# Patient Record
Sex: MALE
[De-identification: names, ages, dates, MRNs, and addresses within clinical notes are randomized per-mention and may not be internally consistent; named-entity substitution may affect disease eponyms.]

---

## 2017-04-05 ENCOUNTER — APPOINTMENT (OUTPATIENT)
Dept: MRI IMAGING | Facility: CLINIC | Age: 62
End: 2017-04-05

## 2017-04-05 ENCOUNTER — OUTPATIENT (OUTPATIENT)
Dept: OUTPATIENT SERVICES | Facility: HOSPITAL | Age: 62
LOS: 1 days | End: 2017-04-05
Payer: COMMERCIAL

## 2017-04-05 DIAGNOSIS — Z00.8 ENCOUNTER FOR OTHER GENERAL EXAMINATION: ICD-10-CM

## 2017-04-05 PROCEDURE — 73221 MRI JOINT UPR EXTREM W/O DYE: CPT

## 2017-06-30 ENCOUNTER — APPOINTMENT (OUTPATIENT)
Dept: UROLOGY | Facility: CLINIC | Age: 62
End: 2017-06-30

## 2018-02-05 ENCOUNTER — INPATIENT (INPATIENT)
Facility: HOSPITAL | Age: 63
LOS: 3 days | Discharge: HOME | DRG: 375 | End: 2018-02-09
Attending: INTERNAL MEDICINE | Admitting: HOSPITALIST
Payer: COMMERCIAL

## 2018-02-05 VITALS
SYSTOLIC BLOOD PRESSURE: 148 MMHG | OXYGEN SATURATION: 96 % | TEMPERATURE: 98 F | RESPIRATION RATE: 18 BRPM | DIASTOLIC BLOOD PRESSURE: 94 MMHG | HEART RATE: 113 BPM

## 2018-02-05 PROCEDURE — 99285 EMERGENCY DEPT VISIT HI MDM: CPT

## 2018-02-05 NOTE — ED ADULT NURSE NOTE - OBJECTIVE STATEMENT
62y male arrived to ED complaining of ab pain. Patient reports that pain is primarily on the right side, radiating to the back. Patient 62y male arrived to ED complaining of ab pain. Patient reports that pain is primarily on the right side, radiating to the back x3 weeks +10lb weight loss in this time. Patient reports that pain has been worsening. Went to PMD had sono and told to come to ED for further evaluation of liver lesion. Patient denies CP, SOB, n/v/d, chills, fever. PMHx TIA, DM.

## 2018-02-06 DIAGNOSIS — G45.9 TRANSIENT CEREBRAL ISCHEMIC ATTACK, UNSPECIFIED: ICD-10-CM

## 2018-02-06 DIAGNOSIS — C79.9 SECONDARY MALIGNANT NEOPLASM OF UNSPECIFIED SITE: ICD-10-CM

## 2018-02-06 DIAGNOSIS — E11.9 TYPE 2 DIABETES MELLITUS WITHOUT COMPLICATIONS: ICD-10-CM

## 2018-02-06 DIAGNOSIS — C80.1 MALIGNANT (PRIMARY) NEOPLASM, UNSPECIFIED: ICD-10-CM

## 2018-02-06 DIAGNOSIS — Z98.890 OTHER SPECIFIED POSTPROCEDURAL STATES: Chronic | ICD-10-CM

## 2018-02-06 DIAGNOSIS — R52 PAIN, UNSPECIFIED: ICD-10-CM

## 2018-02-06 DIAGNOSIS — Z29.9 ENCOUNTER FOR PROPHYLACTIC MEASURES, UNSPECIFIED: ICD-10-CM

## 2018-02-06 DIAGNOSIS — Z95.818 PRESENCE OF OTHER CARDIAC IMPLANTS AND GRAFTS: Chronic | ICD-10-CM

## 2018-02-06 LAB
ALBUMIN SERPL ELPH-MCNC: 3.7 G/DL — SIGNIFICANT CHANGE UP (ref 3.3–5)
ALP SERPL-CCNC: 212 U/L — HIGH (ref 40–120)
ALT FLD-CCNC: 125 U/L RC — HIGH (ref 10–45)
ANION GAP SERPL CALC-SCNC: 15 MMOL/L — SIGNIFICANT CHANGE UP (ref 5–17)
APPEARANCE UR: CLEAR — SIGNIFICANT CHANGE UP
APTT BLD: 27.2 SEC — LOW (ref 27.5–37.4)
AST SERPL-CCNC: 119 U/L — HIGH (ref 10–40)
BASOPHILS # BLD AUTO: 0.1 K/UL — SIGNIFICANT CHANGE UP (ref 0–0.2)
BASOPHILS NFR BLD AUTO: 1.1 % — SIGNIFICANT CHANGE UP (ref 0–2)
BILIRUB SERPL-MCNC: 0.4 MG/DL — SIGNIFICANT CHANGE UP (ref 0.2–1.2)
BILIRUB UR-MCNC: NEGATIVE — SIGNIFICANT CHANGE UP
BUN SERPL-MCNC: 18 MG/DL — SIGNIFICANT CHANGE UP (ref 7–23)
CALCIUM SERPL-MCNC: 9.7 MG/DL — SIGNIFICANT CHANGE UP (ref 8.4–10.5)
CANCER AG125 SERPL-ACNC: 81 U/ML — HIGH
CEA SERPL-MCNC: 137.4 NG/ML — HIGH (ref 0–3.8)
CHLORIDE SERPL-SCNC: 95 MMOL/L — LOW (ref 96–108)
CO2 SERPL-SCNC: 26 MMOL/L — SIGNIFICANT CHANGE UP (ref 22–31)
COLOR SPEC: YELLOW — SIGNIFICANT CHANGE UP
COMMENT - URINE: SIGNIFICANT CHANGE UP
CREAT SERPL-MCNC: 1.01 MG/DL — SIGNIFICANT CHANGE UP (ref 0.5–1.3)
DIFF PNL FLD: NEGATIVE — SIGNIFICANT CHANGE UP
EOSINOPHIL # BLD AUTO: 0.2 K/UL — SIGNIFICANT CHANGE UP (ref 0–0.5)
EOSINOPHIL NFR BLD AUTO: 3 % — SIGNIFICANT CHANGE UP (ref 0–6)
GAS PNL BLDV: SIGNIFICANT CHANGE UP
GLUCOSE SERPL-MCNC: 97 MG/DL — SIGNIFICANT CHANGE UP (ref 70–99)
GLUCOSE UR QL: >1000 MG/DL
HCT VFR BLD CALC: 41 % — SIGNIFICANT CHANGE UP (ref 39–50)
HCV AB S/CO SERPL IA: 0.16 S/CO — SIGNIFICANT CHANGE UP
HCV AB SERPL-IMP: SIGNIFICANT CHANGE UP
HGB BLD-MCNC: 14.2 G/DL — SIGNIFICANT CHANGE UP (ref 13–17)
HIV 1+2 AB+HIV1 P24 AG SERPL QL IA: SIGNIFICANT CHANGE UP
INR BLD: 1.13 RATIO — SIGNIFICANT CHANGE UP (ref 0.88–1.16)
KETONES UR-MCNC: ABNORMAL
LEUKOCYTE ESTERASE UR-ACNC: ABNORMAL
LIDOCAIN IGE QN: 68 U/L — HIGH (ref 7–60)
LYMPHOCYTES # BLD AUTO: 1.4 K/UL — SIGNIFICANT CHANGE UP (ref 1–3.3)
LYMPHOCYTES # BLD AUTO: 17 % — SIGNIFICANT CHANGE UP (ref 13–44)
MCHC RBC-ENTMCNC: 29.5 PG — SIGNIFICANT CHANGE UP (ref 27–34)
MCHC RBC-ENTMCNC: 34.7 GM/DL — SIGNIFICANT CHANGE UP (ref 32–36)
MCV RBC AUTO: 85 FL — SIGNIFICANT CHANGE UP (ref 80–100)
MONOCYTES # BLD AUTO: 1 K/UL — HIGH (ref 0–0.9)
MONOCYTES NFR BLD AUTO: 12.9 % — SIGNIFICANT CHANGE UP (ref 2–14)
NEUTROPHILS # BLD AUTO: 5.2 K/UL — SIGNIFICANT CHANGE UP (ref 1.8–7.4)
NEUTROPHILS NFR BLD AUTO: 66 % — SIGNIFICANT CHANGE UP (ref 43–77)
NITRITE UR-MCNC: NEGATIVE — SIGNIFICANT CHANGE UP
PH UR: 5.5 — SIGNIFICANT CHANGE UP (ref 5–8)
PLATELET # BLD AUTO: 322 K/UL — SIGNIFICANT CHANGE UP (ref 150–400)
POTASSIUM SERPL-MCNC: 4.1 MMOL/L — SIGNIFICANT CHANGE UP (ref 3.5–5.3)
POTASSIUM SERPL-SCNC: 4.1 MMOL/L — SIGNIFICANT CHANGE UP (ref 3.5–5.3)
PROT SERPL-MCNC: 7.3 G/DL — SIGNIFICANT CHANGE UP (ref 6–8.3)
PROT UR-MCNC: SIGNIFICANT CHANGE UP
PROTHROM AB SERPL-ACNC: 12.3 SEC — SIGNIFICANT CHANGE UP (ref 9.8–12.7)
RBC # BLD: 4.82 M/UL — SIGNIFICANT CHANGE UP (ref 4.2–5.8)
RBC # FLD: 12.9 % — SIGNIFICANT CHANGE UP (ref 10.3–14.5)
RBC CASTS # UR COMP ASSIST: SIGNIFICANT CHANGE UP /HPF (ref 0–2)
SODIUM SERPL-SCNC: 136 MMOL/L — SIGNIFICANT CHANGE UP (ref 135–145)
SP GR SPEC: >1.03 — HIGH (ref 1.01–1.02)
UROBILINOGEN FLD QL: NEGATIVE — SIGNIFICANT CHANGE UP
WBC # BLD: 8 K/UL — SIGNIFICANT CHANGE UP (ref 3.8–10.5)
WBC # FLD AUTO: 8 K/UL — SIGNIFICANT CHANGE UP (ref 3.8–10.5)
WBC UR QL: SIGNIFICANT CHANGE UP /HPF (ref 0–5)

## 2018-02-06 PROCEDURE — 71260 CT THORAX DX C+: CPT | Mod: 26

## 2018-02-06 PROCEDURE — 99233 SBSQ HOSP IP/OBS HIGH 50: CPT | Mod: GC

## 2018-02-06 PROCEDURE — 99223 1ST HOSP IP/OBS HIGH 75: CPT

## 2018-02-06 PROCEDURE — 99254 IP/OBS CNSLTJ NEW/EST MOD 60: CPT | Mod: GC

## 2018-02-06 PROCEDURE — 74177 CT ABD & PELVIS W/CONTRAST: CPT | Mod: 26

## 2018-02-06 PROCEDURE — 71046 X-RAY EXAM CHEST 2 VIEWS: CPT | Mod: 26

## 2018-02-06 RX ORDER — CLOPIDOGREL BISULFATE 75 MG/1
0 TABLET, FILM COATED ORAL
Qty: 0 | Refills: 0 | COMMUNITY

## 2018-02-06 RX ORDER — PANTOPRAZOLE SODIUM 20 MG/1
40 TABLET, DELAYED RELEASE ORAL
Qty: 0 | Refills: 0 | Status: DISCONTINUED | OUTPATIENT
Start: 2018-02-06 | End: 2018-02-09

## 2018-02-06 RX ORDER — ASPIRIN/CALCIUM CARB/MAGNESIUM 324 MG
0 TABLET ORAL
Qty: 0 | Refills: 0 | COMMUNITY

## 2018-02-06 RX ORDER — PANTOPRAZOLE SODIUM 20 MG/1
0 TABLET, DELAYED RELEASE ORAL
Qty: 0 | Refills: 0 | COMMUNITY

## 2018-02-06 RX ORDER — ASPIRIN/CALCIUM CARB/MAGNESIUM 324 MG
81 TABLET ORAL DAILY
Qty: 0 | Refills: 0 | Status: DISCONTINUED | OUTPATIENT
Start: 2018-02-06 | End: 2018-02-07

## 2018-02-06 RX ORDER — HEPARIN SODIUM 5000 [USP'U]/ML
5000 INJECTION INTRAVENOUS; SUBCUTANEOUS EVERY 8 HOURS
Qty: 0 | Refills: 0 | Status: DISCONTINUED | OUTPATIENT
Start: 2018-02-06 | End: 2018-02-09

## 2018-02-06 RX ORDER — INSULIN LISPRO 100/ML
VIAL (ML) SUBCUTANEOUS AT BEDTIME
Qty: 0 | Refills: 0 | Status: DISCONTINUED | OUTPATIENT
Start: 2018-02-06 | End: 2018-02-09

## 2018-02-06 RX ORDER — TAMSULOSIN HYDROCHLORIDE 0.4 MG/1
0.4 CAPSULE ORAL AT BEDTIME
Qty: 0 | Refills: 0 | Status: DISCONTINUED | OUTPATIENT
Start: 2018-02-06 | End: 2018-02-09

## 2018-02-06 RX ORDER — KETOROLAC TROMETHAMINE 30 MG/ML
15 SYRINGE (ML) INJECTION EVERY 12 HOURS
Qty: 0 | Refills: 0 | Status: DISCONTINUED | OUTPATIENT
Start: 2018-02-06 | End: 2018-02-09

## 2018-02-06 RX ORDER — ATORVASTATIN CALCIUM 80 MG/1
0 TABLET, FILM COATED ORAL
Qty: 0 | Refills: 0 | COMMUNITY

## 2018-02-06 RX ORDER — DEXTROSE 50 % IN WATER 50 %
25 SYRINGE (ML) INTRAVENOUS ONCE
Qty: 0 | Refills: 0 | Status: DISCONTINUED | OUTPATIENT
Start: 2018-02-06 | End: 2018-02-09

## 2018-02-06 RX ORDER — DEXTROSE 50 % IN WATER 50 %
12.5 SYRINGE (ML) INTRAVENOUS ONCE
Qty: 0 | Refills: 0 | Status: DISCONTINUED | OUTPATIENT
Start: 2018-02-06 | End: 2018-02-09

## 2018-02-06 RX ORDER — SODIUM CHLORIDE 9 MG/ML
1000 INJECTION INTRAMUSCULAR; INTRAVENOUS; SUBCUTANEOUS ONCE
Qty: 0 | Refills: 0 | Status: COMPLETED | OUTPATIENT
Start: 2018-02-06 | End: 2018-02-06

## 2018-02-06 RX ORDER — GLUCAGON INJECTION, SOLUTION 0.5 MG/.1ML
1 INJECTION, SOLUTION SUBCUTANEOUS ONCE
Qty: 0 | Refills: 0 | Status: DISCONTINUED | OUTPATIENT
Start: 2018-02-06 | End: 2018-02-09

## 2018-02-06 RX ORDER — OXYCODONE HYDROCHLORIDE 5 MG/1
5 TABLET ORAL EVERY 6 HOURS
Qty: 0 | Refills: 0 | Status: DISCONTINUED | OUTPATIENT
Start: 2018-02-06 | End: 2018-02-09

## 2018-02-06 RX ORDER — DEXTROSE 50 % IN WATER 50 %
1 SYRINGE (ML) INTRAVENOUS ONCE
Qty: 0 | Refills: 0 | Status: DISCONTINUED | OUTPATIENT
Start: 2018-02-06 | End: 2018-02-09

## 2018-02-06 RX ORDER — TAMSULOSIN HYDROCHLORIDE 0.4 MG/1
0 CAPSULE ORAL
Qty: 0 | Refills: 0 | COMMUNITY

## 2018-02-06 RX ORDER — GLYBURIDE 5 MG
0 TABLET ORAL
Qty: 0 | Refills: 0 | COMMUNITY

## 2018-02-06 RX ORDER — METFORMIN HYDROCHLORIDE 850 MG/1
0 TABLET ORAL
Qty: 0 | Refills: 0 | COMMUNITY

## 2018-02-06 RX ORDER — SODIUM CHLORIDE 9 MG/ML
1000 INJECTION, SOLUTION INTRAVENOUS
Qty: 0 | Refills: 0 | Status: DISCONTINUED | OUTPATIENT
Start: 2018-02-06 | End: 2018-02-09

## 2018-02-06 RX ORDER — KETOROLAC TROMETHAMINE 30 MG/ML
15 SYRINGE (ML) INJECTION EVERY 8 HOURS
Qty: 0 | Refills: 0 | Status: DISCONTINUED | OUTPATIENT
Start: 2018-02-06 | End: 2018-02-06

## 2018-02-06 RX ORDER — CANAGLIFLOZIN 100 MG/1
0 TABLET, FILM COATED ORAL
Qty: 0 | Refills: 0 | COMMUNITY

## 2018-02-06 RX ORDER — INSULIN LISPRO 100/ML
VIAL (ML) SUBCUTANEOUS
Qty: 0 | Refills: 0 | Status: DISCONTINUED | OUTPATIENT
Start: 2018-02-06 | End: 2018-02-09

## 2018-02-06 RX ADMIN — Medication 81 MILLIGRAM(S): at 15:15

## 2018-02-06 RX ADMIN — SODIUM CHLORIDE 1000 MILLILITER(S): 9 INJECTION INTRAMUSCULAR; INTRAVENOUS; SUBCUTANEOUS at 03:55

## 2018-02-06 RX ADMIN — PANTOPRAZOLE SODIUM 40 MILLIGRAM(S): 20 TABLET, DELAYED RELEASE ORAL at 15:14

## 2018-02-06 RX ADMIN — OXYCODONE HYDROCHLORIDE 5 MILLIGRAM(S): 5 TABLET ORAL at 16:57

## 2018-02-06 RX ADMIN — OXYCODONE HYDROCHLORIDE 5 MILLIGRAM(S): 5 TABLET ORAL at 16:27

## 2018-02-06 RX ADMIN — TAMSULOSIN HYDROCHLORIDE 0.4 MILLIGRAM(S): 0.4 CAPSULE ORAL at 23:23

## 2018-02-06 RX ADMIN — HEPARIN SODIUM 5000 UNIT(S): 5000 INJECTION INTRAVENOUS; SUBCUTANEOUS at 15:16

## 2018-02-06 NOTE — CONSULT NOTE ADULT - ATTENDING COMMENTS
Imaging findings suggestive of metastatic disease to liver, lungs, mediastinum.  Dysphagia, CT suggesting extrinsic LN compression on esophagus, can not rule out primary esophageal pathology  Will discuss with oncology order of diagnostic testing  Patient and family aware  Will follow up
Pt seen and examined,d/w family and team.Presentation c/w Esophageal Cancer, will await Bx after EUS.Agree w POC and A/P.

## 2018-02-06 NOTE — H&P ADULT - NSHPREVIEWOFSYSTEMS_GEN_ALL_CORE
Review of Systems:   CONSTITUTIONAL: No fever  EYES: had episode of tunnel vision that resolved (?at time of diagnosed TIA)  ENMT:  No difficulty hearing,   NECK: No pain or stiffness  RESPIRATORY: No cough, No shortness of breath  CARDIOVASCULAR: No chest pain, palpitations,  GASTROINTESTINAL: RUQ abd pain and abdominal distention. No nausea, vomiting,  GENITOURINARY: No dysuria, no difficulty urinating  NEUROLOGICAL: No headaches,   SKIN: No rashes  MUSCULOSKELETAL: No joint pain or swelling;   PSYCHIATRIC: No depression,   ALLERY AND IMMUNOLOGIC: No hives or eczema

## 2018-02-06 NOTE — PROGRESS NOTE ADULT - SUBJECTIVE AND OBJECTIVE BOX
[facility] - Division of Pulmonary, Critical Care and Sleep Medicine   Please call [phone] between 8am-pm weekdays, [phone] after hours and weekends      CHIEF COMPLAINT:  Abdominal pain, constipation    HPI:  63M h/o recent TIA, DM2, BPH p/w RUQ abdominal pain with radiation to back, decreased po intake with 10lb weight loss and constipation for the past 2-3 weeks. Patient states he developed right arm numbness on Jan 19th, that resolved in a few days, was seen by a neurologist and had outpatient MRI brain reportedly without hemorrhage or acute stroke and loop recorder placed (left chest) on 2/2 (MRI images and report not available for review). Was diagnosed with TIA and started on asa and plavix.   Also recently started on Bydureon, recent outpatient labs with elevated LFTs, with an outpatient abd us that suggested liver mets. Denies fever, chills, nausea, vomiting, chest pain or cough.   CXR with b/l pulmonary nodules. CT C/A/P with diffuse lung mets, mediastinal lymphadenopathy, esophageal thickening, multiple hepatic mets and left adrenal nodule    +Former smoker, 25 pack years. Denies prior pulmonary history. Denies SOB or cough.     Colonoscopy at age 51 - normal per patient and was scheduled for colonoscopy on 3/2/18 with Dr. Tavares.   patient had psa checked 12/2017 and was normal per patient. no family history of cancer.       PAST MEDICAL & SURGICAL HISTORY:  TIA (transient ischemic attack)  Diabetes  H/O prior ablation treatment: for wpw  Status post placement of implantable loop recorder      FAMILY HISTORY:  Family history of hypertension (Father)      SOCIAL HISTORY:  Smoking: [ ] Never Smoked [x ] Former Smoker (_1_ packs x _25__ years) [ ] Current Smoker  (__ packs x ___ years)  Substance Use: [- ] Never Used [ ] Used ____  EtOH Use: denies  Marital Status: [ ] Single [x ]  [ ]  [ ]   Occupation: , denies occupational exposures  Recent Travel:  Country of Birth:  Advance Directives:    Allergies    No Known Allergies    HOME MEDICATIONS: reviewed in H&P    REVIEW OF SYSTEMS:  Constitutional: [- ] fevers [- ] chills [ ] weight loss [ ] weight gain  HEENT: [ ] dry eyes [ ] eye irritation [ -] postnasal drip [ ] nasal congestion  CV: [ -] chest pain [ ] orthopnea [- ] palpitations [ ] murmur  Resp: [- ] cough [- ] shortness of breath [- ] wheezing [- ] sputum [ ] hemoptysis  GI: [- ] nausea [- ] vomiting [ ] diarrhea [+ ] constipation [+ ] abd pain [ ] dysphagia   : [ -] dysuria [- ] nocturia [ ] hematuria [ ] increased urinary frequency  Musculoskeletal: [ -] myalgias [ ] arthralgias   Skin: [- ] rash [ ] itch  Neurological: [- ] headache [ ] dizziness [ ] syncope [ ] weakness [+ ] numbness/tingling RUE  Psychiatric: [- ] anxiety [- ] depression  Endocrine: [+ ] diabetes [- ] thyroid problem  Hematologic/Lymphatic: [- ] anemia [ -] bleeding problem  Allergic/Immunologic: [- ] itchy eyes [- ] nasal discharge [ ] hives [ ] angioedema  [x ] All other systems negative  [ ] Unable to assess ROS because ________    OBJECTIVE:  ICU Vital Signs Last 24 Hrs  T(C): 36.6 (06 Feb 2018 13:40), Max: 36.9 (06 Feb 2018 02:39)  T(F): 97.9 (06 Feb 2018 13:40), Max: 98.5 (06 Feb 2018 02:39)  HR: 96 (06 Feb 2018 13:40) (96 - 113)  BP: 124/78 (06 Feb 2018 13:40) (112/77 - 148/94)  BP(mean): --  ABP: --  ABP(mean): --  RR: 16 (06 Feb 2018 13:40) (16 - 18)  SpO2: 96% (06 Feb 2018 13:40) (94% - 96%)  POCT Blood Glucose.: 133 mg/dL (06 Feb 2018 11:07)      PHYSICAL EXAM:  General:  NAD  HEENT:  NC/AT  Neck: Supple  Respiratory:  CTA B/L, no wheezes, crackles or rhonchi  Cardiovascular:  RRR, no m/r/g  Abdomen: soft, NT/ND, +BS  Extremities: mild clubbing, no cyanosis or edema, warm  Skin: no rash  Neurological: AAOx3, no focal deficits  Psychiatry: not anxious, normal affect    HOSPITAL MEDICATIONS:  MEDICATIONS  (STANDING):  aspirin enteric coated 81 milliGRAM(s) Oral daily  dextrose 5%. 1000 milliLiter(s) (50 mL/Hr) IV Continuous <Continuous>  dextrose 50% Injectable 12.5 Gram(s) IV Push once  dextrose 50% Injectable 25 Gram(s) IV Push once  dextrose 50% Injectable 25 Gram(s) IV Push once  heparin  Injectable 5000 Unit(s) SubCutaneous every 8 hours  insulin lispro (HumaLOG) corrective regimen sliding scale   SubCutaneous three times a day before meals  insulin lispro (HumaLOG) corrective regimen sliding scale   SubCutaneous at bedtime  pantoprazole    Tablet 40 milliGRAM(s) Oral before breakfast  tamsulosin 0.4 milliGRAM(s) Oral at bedtime    MEDICATIONS  (PRN):  dextrose Gel 1 Dose(s) Oral once PRN Blood Glucose LESS THAN 70 milliGRAM(s)/deciliter  glucagon  Injectable 1 milliGRAM(s) IntraMuscular once PRN Glucose LESS THAN 70 milligrams/deciliter  ketorolac   Injectable 15 milliGRAM(s) IV Push every 12 hours PRN Moderate Pain (4 - 6)  oxyCODONE    IR 5 milliGRAM(s) Oral every 6 hours PRN Severe Pain (7 - 10)      LABS:                        14.2   8.0   )-----------( 322      ( 06 Feb 2018 03:23 )             41.0     Hgb Trend: 14.2<--  02-06    136  |  95<L>  |  18  ----------------------------<  97  4.1   |  26  |  1.01    Ca    9.7      06 Feb 2018 03:23    TPro  7.3  /  Alb  3.7  /  TBili  0.4  /  DBili  x   /  AST  119<H>  /  ALT  125<H>  /  AlkPhos  212<H>  02-06    Creatinine Trend: 1.01<--  PT/INR - ( 06 Feb 2018 12:54 )   PT: 12.3 sec;   INR: 1.13 ratio         PTT - ( 06 Feb 2018 12:54 )  PTT:27.2 sec  Urinalysis Basic - ( 06 Feb 2018 03:55 )    Color: Yellow / Appearance: Clear / SG: >1.030 / pH: x  Gluc: x / Ketone: Trace  / Bili: Negative / Urobili: Negative   Blood: x / Protein: Trace / Nitrite: Negative   Leuk Esterase: Trace / RBC: 3-5 /HPF / WBC 6-10 /HPF   Sq Epi: x / Non Sq Epi: x / Bacteria: x        Venous Blood Gas:  02-06 @ 03:23  7.40/45/46/27/75  VBG Lactate: 5.0      MICROBIOLOGY:     RADIOLOGY:  [x ] Reviewed and interpreted by me  < from: CT Chest w/ IV Cont (02.06.18 @ 04:38) >  EXAM:  CT ABDOMEN AND PELVIS OC IC                          EXAM:  CT CHEST IC                            PROCEDURE DATE:  02/06/2018            INTERPRETATION:  CLINICAL INFORMATION: Mid abdominal pain radiating to   the right side. Known hepatic metastases.    COMPARISON: Chest radiograph on 2/6/2018.    PROCEDURE:   CT of the Chest, Abdomen and Pelvis was performed with intravenous   contrast.   Intravenous contrast: 90 ml Omnipaque 350. 10 ml discarded.  Oral contrast: positive contrast wasadministered.  Sagittal and coronal reformats were performed.    FINDINGS:    CHEST:     LUNGS AND LARGE AIRWAYS: Patent central airways .Numerous bilateral   pulmonary nodules compatible with metastatic disease.   PLEURA: No pleural effusion.  VESSELS: Within normal limits.  HEART: Heart size is normal.No pericardial effusion. Coronary artery   atherosclerotic calcifications.  MEDIASTINUM AND JESUS: Extensive mediastinal and bilateral hilar   adenopathy. For example a pretracheal lymph node measures 2.5 x 1.6 cm   (2, 27) and a subcarinal lymph node measures 3.2 x 2.6 cm.  CHEST WALL AND LOWER NECK: Loop recorder noted in the left chest wall   with surrounding infiltration of the subcutaneous fat.    ABDOMEN AND PELVIS:    LIVER: Extensive bilobar hepatic metastases  BILE DUCTS: Normal caliber.  GALLBLADDER: Within normal limits.  SPLEEN: Within normal limits.  PANCREAS: Within normal limits.  ADRENALS: Indeterminate 1 cm left adrenal nodule may be metastatic.  KIDNEYS/URETERS: Within normal limits.    BLADDER: Within normal limits.  REPRODUCTIVE ORGANS: Enlarged prostate.    BOWEL: No bowel obstruction or bowel wall thickening. Appendix is not   definitively identified.  PERITONEUM: No ascites or pneumoperitoneum. No loculated collection to   suggest abscess. No mesenteric adenopathy.  VESSELS:  Atheromatous disease of the aorta and iliac vessels. Normal   caliber abdominal aorta.  RETROPERITONEUM: Enlarged gastrohepatic lymph nodes, largest measuring up   to 1.8 x 1.5 cm (series2:69).  ABDOMINAL WALL: Soft tissue density foci in the subcutaneous anterior   abdominal wall soft tissues, likely related to subcutaneous medication   injection. Small fat-containing umbilical and bilateral inguinal hernias.  BONES: Degenerative changes of the spine. No suspicious lytic or   sclerotic bony lesion. Indeterminant age nondisplaced fracture of the   left-sided L2 transverse process.    IMPRESSION:   Bilateral pulmonary metastases.    Extensive bilateral hilar and mediastinal adenopathy, presumably   metastatic adenopathy.    Diffuse hepatic metastases.    Enlarged gastrohepatic lymph nodes, likely metastatic.    Indeterminate 1 cm left adrenal nodule may be metastatic.    Indeterminate age nondisplaced fracture of the left-sided L2 transverse   process.    < end of copied text >    PULMONARY FUNCTION TESTS: none available.     EKG: none available.

## 2018-02-06 NOTE — H&P ADULT - PROBLEM SELECTOR PLAN 1
findings on ct a/p suggestive of metastatic disease - findings ct a/p and plan of care discussed in detail with patient and wife - face to face time - 20 minutes.   unclear primary - check CEA, Ca 19-9, Ca 125, oncology consult called, GI consult for possible colonoscoy  on bydureon - ?associated with thyroid ca

## 2018-02-06 NOTE — CONSULT NOTE ADULT - PROBLEM SELECTOR RECOMMENDATION 9
need tissue diagnosis with core biopsy  agree with CEA, CA 19-9,   check FOBT  check LDH, AFP, PSA, HIV, Hepatitis  -would ideally get core biopsy of liver nodules or need tissue diagnosis with core biopsy  would get core biopsy of liver masses, not an FNA  also consider bronchoscopy for pulm nodules  agree with CEA, CA 19-9,   check FOBT-if positive would get colonoscopy  check LDH, AFP, PSA, HIV, Hepatitis

## 2018-02-06 NOTE — CONSULT NOTE ADULT - ASSESSMENT
64 yo M with TIA, DM, BPH admitted for abdominal pain found to have liver nodules, lung nodules, adrenal nodule, and lymphadenopathy.

## 2018-02-06 NOTE — CONSULT NOTE ADULT - SUBJECTIVE AND OBJECTIVE BOX
HPI:  63M h/o TIA, DM2, BPH p/w RUQ abdominal pain with radiation to back, decreased po intake with 10lb weight loss. patient has not been feeling well since january 19th. he developed right arm numbness that resolved in a few days. patient had outpatient MRI brain reportedly without hemorrhage or acute stroke and loop recorder placed (left chest) on 2/2. patient was told it may have been a TIA and started on asa and plavix. patient was recently started on bydureon. patient also had blood work with pmd that showed elevated LFTs, had outpatient abd us that suggested liver mets. With increasing abdominal pain, patient came to ED. pain currently 5/10. patient c/o early satiety. no fevers/chills. some constipation but having bowel movements. no nausea or vomiting. took naproxen which improved pain.      of note, patient had colonoscopy at age 51 - normal per patient and was scheduled for colonoscopy on 3/2/18 with Dr. Tavares.   patient had psa checked 12/2017 and was normal per patient. no family history of cancer. (06 Feb 2018 08:48)      Allergies    No Known Allergies    Intolerances        MEDICATIONS  (STANDING):  aspirin enteric coated 81 milliGRAM(s) Oral daily  dextrose 5%. 1000 milliLiter(s) (50 mL/Hr) IV Continuous <Continuous>  dextrose 50% Injectable 12.5 Gram(s) IV Push once  dextrose 50% Injectable 25 Gram(s) IV Push once  dextrose 50% Injectable 25 Gram(s) IV Push once  heparin  Injectable 5000 Unit(s) SubCutaneous every 8 hours  insulin lispro (HumaLOG) corrective regimen sliding scale   SubCutaneous three times a day before meals  insulin lispro (HumaLOG) corrective regimen sliding scale   SubCutaneous at bedtime  pantoprazole    Tablet 40 milliGRAM(s) Oral before breakfast  tamsulosin 0.4 milliGRAM(s) Oral at bedtime    MEDICATIONS  (PRN):  dextrose Gel 1 Dose(s) Oral once PRN Blood Glucose LESS THAN 70 milliGRAM(s)/deciliter  glucagon  Injectable 1 milliGRAM(s) IntraMuscular once PRN Glucose LESS THAN 70 milligrams/deciliter  ketorolac   Injectable 15 milliGRAM(s) IV Push every 12 hours PRN Moderate Pain (4 - 6)  oxyCODONE    IR 5 milliGRAM(s) Oral every 6 hours PRN Severe Pain (7 - 10)      PAST MEDICAL & SURGICAL HISTORY:  TIA (transient ischemic attack)  Diabetes  H/O prior ablation treatment: for wpw  Status post placement of implantable loop recorder      FAMILY HISTORY:  Family history of hypertension (Father)      SOCIAL HISTORY: No EtOH, no tobacco    REVIEW OF SYSTEMS:    CONSTITUTIONAL: No weakness, fevers or chills  EYES/ENT: No visual changes;  No vertigo or throat pain   NECK: No pain or stiffness  RESPIRATORY: No cough, wheezing, hemoptysis; No shortness of breath  CARDIOVASCULAR: No chest pain or palpitations  GASTROINTESTINAL: No abdominal or epigastric pain. No nausea, vomiting, or hematemesis; No diarrhea or constipation. No melena or hematochezia.  GENITOURINARY: No dysuria, frequency or hematuria  NEUROLOGICAL: No numbness or weakness  SKIN: No itching, burning, rashes, or lesions   All other review of systems is negative unless indicated above.        T(F): 97.7 (02-06-18 @ 07:55), Max: 98.5 (02-06-18 @ 02:39)  HR: 99 (02-06-18 @ 07:55)  BP: 120/75 (02-06-18 @ 07:55)  RR: 16 (02-06-18 @ 07:55)  SpO2: 96% (02-06-18 @ 07:55)  Wt(kg): --    GENERAL: NAD, well-developed  HEAD:  Atraumatic, Normocephalic  EYES: EOMI, PERRLA, conjunctiva and sclera clear  NECK: Supple, No JVD  CHEST/LUNG: Clear to auscultation bilaterally; No wheeze  HEART: Regular rate and rhythm; No murmurs, rubs, or gallops  ABDOMEN: Soft, Nontender, Nondistended; Bowel sounds present  EXTREMITIES:  2+ Peripheral Pulses, No clubbing, cyanosis, or edema  NEUROLOGY: non-focal  SKIN: No rashes or lesions                          14.2   8.0   )-----------( 322      ( 06 Feb 2018 03:23 )             41.0       02-06    136  |  95<L>  |  18  ----------------------------<  97  4.1   |  26  |  1.01    Ca    9.7      06 Feb 2018 03:23    TPro  7.3  /  Alb  3.7  /  TBili  0.4  /  DBili  x   /  AST  119<H>  /  ALT  125<H>  /  AlkPhos  212<H>  02-06 HPI:  63M h/o TIA, DM2, BPH p/w RUQ abdominal pain with radiation to back, decreased po intake with 10lb weight loss. patient has not been feeling well since january 19th. he developed right arm numbness that resolved in a few days. patient had outpatient MRI brain reportedly without hemorrhage or acute stroke and loop recorder placed (left chest) on 2/2. patient was told it may have been a TIA and started on asa and plavix. patient was recently started on bydureon. patient also had blood work with pmd that showed elevated LFTs, had outpatient abd us that suggested liver mets. With increasing abdominal pain, patient came to ED. pain currently 5/10. patient c/o early satiety. no fevers/chills. some constipation but having bowel movements. no nausea or vomiting. took naproxen which improved pain.      of note, patient had colonoscopy at age 51 - normal per patient and was scheduled for colonoscopy on 3/2/18 with Dr. Tavares.   patient had psa checked 12/2017 and was normal per patient. no family history of cancer. (06 Feb 2018 08:48)      Allergies    No Known Allergies    Intolerances        MEDICATIONS  (STANDING):  aspirin enteric coated 81 milliGRAM(s) Oral daily  dextrose 5%. 1000 milliLiter(s) (50 mL/Hr) IV Continuous <Continuous>  dextrose 50% Injectable 12.5 Gram(s) IV Push once  dextrose 50% Injectable 25 Gram(s) IV Push once  dextrose 50% Injectable 25 Gram(s) IV Push once  heparin  Injectable 5000 Unit(s) SubCutaneous every 8 hours  insulin lispro (HumaLOG) corrective regimen sliding scale   SubCutaneous three times a day before meals  insulin lispro (HumaLOG) corrective regimen sliding scale   SubCutaneous at bedtime  pantoprazole    Tablet 40 milliGRAM(s) Oral before breakfast  tamsulosin 0.4 milliGRAM(s) Oral at bedtime    MEDICATIONS  (PRN):  dextrose Gel 1 Dose(s) Oral once PRN Blood Glucose LESS THAN 70 milliGRAM(s)/deciliter  glucagon  Injectable 1 milliGRAM(s) IntraMuscular once PRN Glucose LESS THAN 70 milligrams/deciliter  ketorolac   Injectable 15 milliGRAM(s) IV Push every 12 hours PRN Moderate Pain (4 - 6)  oxyCODONE    IR 5 milliGRAM(s) Oral every 6 hours PRN Severe Pain (7 - 10)      PAST MEDICAL & SURGICAL HISTORY:  TIA (transient ischemic attack)  Diabetes  H/O prior ablation treatment: for wpw  Status post placement of implantable loop recorder      FAMILY HISTORY:  Family history of hypertension (Father)      SOCIAL HISTORY: No EtOH, no tobacco    REVIEW OF SYSTEMS:    CONSTITUTIONAL: No weakness, fevers or chills  EYES/ENT: No visual changes;  No vertigo or throat pain   NECK: No pain or stiffness  RESPIRATORY: No cough, wheezing, hemoptysis; No shortness of breath  CARDIOVASCULAR: No chest pain or palpitations  GASTROINTESTINAL: No abdominal or epigastric pain. No nausea, vomiting, or hematemesis; No diarrhea or constipation. No melena or hematochezia.  GENITOURINARY: No dysuria, frequency or hematuria  NEUROLOGICAL: No numbness or weakness  SKIN: No itching, burning, rashes, or lesions   All other review of systems is negative unless indicated above.        T(F): 97.7 (02-06-18 @ 07:55), Max: 98.5 (02-06-18 @ 02:39)  HR: 99 (02-06-18 @ 07:55)  BP: 120/75 (02-06-18 @ 07:55)  RR: 16 (02-06-18 @ 07:55)  SpO2: 96% (02-06-18 @ 07:55)  Wt(kg): --    GENERAL: NAD, well-developed  HEAD:  Atraumatic, Normocephalic  EYES: EOMI, PERRLA, conjunctiva and sclera clear  NECK: Supple, No JVD  CHEST/LUNG: Clear to auscultation bilaterally; No wheeze  HEART: Regular rate and rhythm; No murmurs, rubs, or gallops  ABDOMEN: Soft, Nontender, Nondistended; Bowel sounds present  EXTREMITIES:  2+ Peripheral Pulses, No clubbing, cyanosis, or edema  NEUROLOGY: non-focal  SKIN: No rashes or lesions                          14.2   8.0   )-----------( 322      ( 06 Feb 2018 03:23 )             41.0       02-06    136  |  95<L>  |  18  ----------------------------<  97  4.1   |  26  |  1.01    Ca    9.7      06 Feb 2018 03:23    TPro  7.3  /  Alb  3.7  /  TBili  0.4  /  DBili  x   /  AST  119<H>  /  ALT  125<H>  /  AlkPhos  212<H>  02-06    < from: CT Abdomen and Pelvis w/ Oral Cont and w/ IV Cont (02.06.18 @ 04:38) >    EXAM:  CT ABDOMEN AND PELVIS OC IC                          EXAM:  CT CHEST IC                            PROCEDURE DATE:  02/06/2018            INTERPRETATION:  CLINICAL INFORMATION: Mid abdominal pain radiating to   the right side. Known hepatic metastases.    COMPARISON: Chest radiograph on 2/6/2018.    PROCEDURE:   CT of the Chest, Abdomen and Pelvis was performed with intravenous   contrast.   Intravenous contrast: 90 ml Omnipaque 350. 10 ml discarded.  Oral contrast: positive contrast wasadministered.  Sagittal and coronal reformats were performed.    FINDINGS:    CHEST:     LUNGS AND LARGE AIRWAYS: Patent central airways .Numerous bilateral   pulmonary nodules compatible with metastatic disease.   PLEURA: No pleural effusion.  VESSELS: Within normal limits.  HEART: Heart size is normal.No pericardial effusion. Coronary artery   atherosclerotic calcifications.  MEDIASTINUM AND JESUS: Extensive mediastinal and bilateral hilar   adenopathy. For example a pretracheal lymph node measures 2.5 x 1.6 cm   (2, 27) and a subcarinal lymph node measures 3.2 x 2.6 cm.  CHEST WALL AND LOWER NECK: Loop recorder noted in the left chest wall   with surrounding infiltration of the subcutaneous fat.    ABDOMEN AND PELVIS:    LIVER: Extensive bilobar hepatic metastases  BILE DUCTS: Normal caliber.  GALLBLADDER: Within normal limits.  SPLEEN: Within normal limits.  PANCREAS: Within normal limits.  ADRENALS: Indeterminate 1 cm left adrenal nodule may be metastatic.  KIDNEYS/URETERS: Within normal limits.    BLADDER: Within normal limits.  REPRODUCTIVE ORGANS: Enlarged prostate.    BOWEL: No bowel obstruction or bowel wall thickening. Appendix is not   definitively identified.  PERITONEUM: No ascites or pneumoperitoneum. No loculated collection to   suggest abscess. No mesenteric adenopathy.  VESSELS:  Atheromatous disease of the aorta and iliac vessels. Normal   caliber abdominal aorta.  RETROPERITONEUM: Enlarged gastrohepatic lymph nodes, largest measuring up   to 1.8 x 1.5 cm (series2:69).  ABDOMINAL WALL: Soft tissue density foci in the subcutaneous anterior   abdominal wall soft tissues, likely related to subcutaneous medication   injection. Small fat-containing umbilical and bilateral inguinal hernias.  BONES: Degenerative changes of the spine. No suspicious lytic or   sclerotic bony lesion. Indeterminant age nondisplaced fracture of the   left-sided L2 transverse process.    IMPRESSION:   Bilateral pulmonary metastases.    Extensive bilateral hilar and mediastinal adenopathy, presumably   metastatic adenopathy.    Diffuse hepatic metastases.    Enlarged gastrohepatic lymph nodes, likely metastatic.    Indeterminate 1 cm left adrenal nodule may be metastatic.    Indeterminate age nondisplaced fracture of the left-sided L2 transverse   process.      < end of copied text >

## 2018-02-06 NOTE — H&P ADULT - HISTORY OF PRESENT ILLNESS
63M h/o TIA, DM2, BPH p/w RUQ abdominal pain with radiation to back, decreased po intake with 10lb weight loss. patient has not been feeling well since january 19th. he developed right arm numbness that resolved in a few days. patient had outpatient MRI brain reportedly without hemorrhage or acute stroke and loop recorder placed (left chest) on 2/2. patient was told it may have been a TIA and started on asa and plavix. patient was recently started on bydureon. patient also had blood work with pmd that showed elevated LFTs, had outpatient abd us that suggested liver mets. With increasing abdominal pain, patient came to ED. pain currently 5/10. patient c/o early satiety. no fevers/chills. some constipation but having bowel movements. no nausea or vomiting. took naproxen which improved pain.      of note, patient had colonoscopy at age 51 - normal per patient and was scheduled for colonoscopy on 3/2/18 with Dr. Tavares.   patient had psa checked 12/2017 and was normal per patient. 63M h/o TIA, DM2, BPH p/w RUQ abdominal pain with radiation to back, decreased po intake with 10lb weight loss. patient has not been feeling well since january 19th. he developed right arm numbness that resolved in a few days. patient had outpatient MRI brain reportedly without hemorrhage or acute stroke and loop recorder placed (left chest) on 2/2. patient was told it may have been a TIA and started on asa and plavix. patient was recently started on bydureon. patient also had blood work with pmd that showed elevated LFTs, had outpatient abd us that suggested liver mets. With increasing abdominal pain, patient came to ED. pain currently 5/10. patient c/o early satiety. no fevers/chills. some constipation but having bowel movements. no nausea or vomiting. took naproxen which improved pain.      of note, patient had colonoscopy at age 51 - normal per patient and was scheduled for colonoscopy on 3/2/18 with Dr. Tavares.   patient had psa checked 12/2017 and was normal per patient. no family history of cancer.

## 2018-02-06 NOTE — ED PROVIDER NOTE - NS ED ROS FT
ROS: denies HA, dizziness, fevers/chills, vomiting, chest pain, SOB, diaphoresis, diarrhea, joint pain, neuro deficits, dysuria/hematuria, rash    +weakness, weight loss, nausea, ruq pain, R flank pain

## 2018-02-06 NOTE — ED PROVIDER NOTE - OBJECTIVE STATEMENT
63yo M with hx TIA, DM presents today with RUQ abdominal pain radiating to the R flank for 3 weeks. lost 10 lbs during this time. fullness of the stomach after eating a small meal. pain getting worse. had RUQ sono from PMD, was found to have possible liver mets lesions, referred for CT scan. no fevers, chills. feels weak

## 2018-02-06 NOTE — PROGRESS NOTE ADULT - ASSESSMENT
63M h/o recent TIA, DM2, BPH p/w RUQ abdominal pain with radiation to back, decreased po intake with 10lb weight loss and constipation for the past 2-3 weeks. Patient states he developed right arm numbness on Jan 19th, that resolved in a few days, was seen by a neurologist and had outpatient MRI brain reportedly without hemorrhage or acute stroke and loop recorder placed (left chest) on 2/2 (MRI images and report not available for review). Was diagnosed with TIA and started on asa and plavix.   Also recently started on Bydureon, recent outpatient labs with elevated LFTs, with an outpatient abd us that suggested liver mets. Denies fever, chills, nausea, vomiting, chest pain or cough.   CXR with b/l pulmonary nodules. CT C/A/P with diffuse lung mets, mediastinal lymphadenopathy, esophageal thickening, multiple hepatic mets and left adrenal nodule    +Former smoker, 25 pack years. Denies prior pulmonary history. Denies SOB or cough.   Mild clubbing on exam - patient and wife unsure how chronic it is.  No cyanosis, not hypoxemic    A/P: Metastatic disease- unclear primary  Agree with holding Asa and Plavix for 5 days prior to biopsy as high risk for bleed on dual antiplatelet therapy.  Would recommend biopsy of liver lesions first as least invasive and there is significant amount of disease on CT. If biopsy nondiagnostic, then would pursue Bronchoscopy with EBUS which will require general anesthesia.   Oncology following - agree with further labs such as PSA, CEA, LDH  GI also following for RUQ pain, tentative plan for EGD given his multiple upper GI symptoms and signs of esophageal thickening/abnormalities on CT abdomen  Also recommend MRI brain to evaluate for lesions and comparison to MRI done at [facility]. D/w patient and family about obtaining images on CD from MRI brain 3 weeks ago for comparison    Thank you for the consult. Will follow up  Extensive discussion at bedside with patient, wife and daughter.

## 2018-02-06 NOTE — H&P ADULT - ASSESSMENT
63M h/o TIA, DM2, BPH found to have metastatic disease of unknown primary admitted for pain control and FTT.

## 2018-02-06 NOTE — ED ADULT NURSE REASSESSMENT NOTE - NS ED NURSE REASSESS COMMENT FT1
A/O x3, admitted, awaiting bed assignment, pain 8/10 rt side abd radiating to back, appears in NAD, wife at bedside.

## 2018-02-06 NOTE — CONSULT NOTE ADULT - ASSESSMENT
62 year old male with DMII, BPH, WPW syndrome s/p ablation, suspected TIA in 1/2018, p/w 3-4 weeks of worsening epigastric/RUQ pain radiating to the back, along with early satiety, poor PO intake and ~10 pound weight loss.     #Abdominal pain, early satiety, weight loss  - likely 2/2 malignancy as seen on CT abdomen/pelvis, with unknown primary source.   - multiple hepatic and pulmonary nodules seen, strongly suggestive of malignancy.  - elevated LFT's likely 2/2 hepatic nodules.   Differential includes gastric or colon malignancy. 62 year old male with DMII, BPH, WPW syndrome s/p ablation, suspected TIA in 1/2018, p/w 3-4 weeks of worsening epigastric/RUQ pain radiating to the back, along with early satiety, dysphagia to solids and liquids, poor PO intake and ~10 pound weight loss.     #Abdominal pain, early satiety, weight loss  - likely 2/2 malignancy as seen on CT abdomen/pelvis, with unknown primary source.   - multiple hepatic and pulmonary nodules seen, strongly suggestive of malignancy.  - elevated LFT's likely 2/2 hepatic nodules.   Differential includes gastric, esophageal or colon malignancy. Upper GI malignancy more likely given symptoms of early satiety, dysphagia, pulmonary mets, etc.    No urgent need for endoscopy at present,  but in addition to oncology workup (likely plan for biopsy of an enlarged LN),  patient may also need an EGD given his multiple upper GI symptoms and signs of esophageal thickening/abnormalities on CT abdomen (confirmed with radiologist). Will discuss with oncology. 62 year old male with DMII, BPH, WPW syndrome s/p ablation, suspected TIA in 1/2018, p/w 3-4 weeks of worsening epigastric/RUQ pain radiating to the back, along with early satiety, dysphagia to solids and liquids, poor PO intake and ~10 pound weight loss.     #Abdominal pain, early satiety, weight loss  - likely 2/2 malignancy as seen on CT abdomen/pelvis, with unknown primary source.   - multiple hepatic and pulmonary nodules seen, strongly suggestive of malignancy.  - elevated LFT's likely 2/2 hepatic nodules.   Differential includes gastric, esophageal or colon malignancy. Upper GI malignancy more likely given symptoms of early satiety, dysphagia, pulmonary mets, etc.    No urgent need for endoscopy at present,  but in addition to oncology workup (likely plan for core biopsy of liver or LN),  patient will also need an EGD given his multiple upper GI symptoms and signs of esophageal thickening/abnormalities on CT abdomen (confirmed with radiologist). Keep NPO after midnight (tentative plan for tomorrow)

## 2018-02-06 NOTE — ED PROVIDER NOTE - MEDICAL DECISION MAKING DETAILS
MDM: pt with confirmed liver/lung/GI mets on CT scan, will admit for pain control, onc MDM: pt with confirmed liver/lung/GI mets on CT scan, will admit for pain control, onc  Dr. Burr: Male patient with past hx of DM, TIA, referred to due to complaints of RUQ pain with weight loss. Patient found in no acute distress, calm, speaking in complete sentences. Patient was referred to ED for CT scan after RUQ ultrasound showed findings concerning for metastatic lesions in liver. Patient describes having RUQ abdominal discomfort with associated weight loss. Vital signs stable at ED, with physical exam revealing no clinical signs of systemic toxicity or dehydration. Labs, CT scan ordered to assess for electrolyte abnormalities, metastasis in chest/abdomen. Will f/u on results and patient's clinical progression to determine final disposition.

## 2018-02-06 NOTE — H&P ADULT - NSHPLABSRESULTS_GEN_ALL_CORE
LABS:                        14.2   8.0   )-----------( [id number]      ( 06 Feb 2018 03:23 )             41.0     02-06    136  |  95<L>  |  18  ----------------------------<  97  4.1   |  26  |  1.01    Ca    9.7      06 Feb 2018 03:23    TPro  7.3  /  Alb  3.7  /  TBili  0.4  /  DBili  x   /  AST  119<H>  /  ALT  125<H>  /  AlkPhos  212<H>  02-06          Urinalysis Basic - ( 06 Feb 2018 03:55 )    Color: Yellow / Appearance: Clear / SG: >1.030 / pH: x  Gluc: x / Ketone: Trace  / Bili: Negative / Urobili: Negative   Blood: x / Protein: Trace / Nitrite: Negative   Leuk Esterase: Trace / RBC: 3-5 /HPF / WBC 6-10 /HPF   Sq Epi: x / Non Sq Epi: x / Bacteria: x        RADIOLOGY & ADDITIONAL TESTS:    Imaging Personally Reviewed:    Consultant(s) Notes Reviewed:      Care Discussed with Consultants/Other Providers: LABS:                        14.2   8.0   )-----------( 322      ( 06 Feb 2018 03:23 )             41.0     02-06    136  |  95<L>  |  18  ----------------------------<  97  4.1   |  26  |  1.01    Ca    9.7      06 Feb 2018 03:23    TPro  7.3  /  Alb  3.7  /  TBili  0.4  /  DBili  x   /  AST  119<H>  /  ALT  125<H>  /  AlkPhos  212<H>  02-06          Urinalysis Basic - ( 06 Feb 2018 03:55 )    Color: Yellow / Appearance: Clear / SG: >1.030 / pH: x  Gluc: x / Ketone: Trace  / Bili: Negative / Urobili: Negative   Blood: x / Protein: Trace / Nitrite: Negative   Leuk Esterase: Trace / RBC: 3-5 /HPF / WBC 6-10 /HPF   Sq Epi: x / Non Sq Epi: x / Bacteria: x        RADIOLOGY & ADDITIONAL TESTS:    Imaging Personally Reviewed: CT a/p reviewed - Bilateral pulmonary metastases.    Extensive bilateral hilar and mediastinal adenopathy, presumably   metastatic adenopathy.    Diffuse hepatic metastases.    Enlarged gastrohepatic lymph nodes, likely metastatic.    Indeterminate 1 cm left adrenal nodule may be metastatic.    Indeterminate age nondisplaced fracture of the left-sided L2 transverse   process.      Consultant(s) Notes Reviewed:      Care Discussed with Consultants/Other Providers: Oncology consult called - case discussed

## 2018-02-06 NOTE — ED PROVIDER NOTE - PROGRESS NOTE DETAILS
Spoke to Dr. Saucedo's office - aware of new cancer dx. will admit to hospitalist. Will admit under Dr. Hussein, spoke to hospitalist. Patient was evaluated by me, found in no acute distress, calm, speaking in complete sentences. Patient was referred to ED for CT scan after RUQ ultrasound showed findings concerning for metastatic lesions in liver. Patient describes having RUQ abdominal discomfort with associated weight loss. Vital signs stable at ED, with physical exam revealing no clinical signs of systemic toxicity or dehydration. Labs, CT scan ordered to assess for electrolyte abnormalities, metastasis in chest/abdomen. Will f/u on results and patient's clinical progression to determine final disposition. I agree with resident assessment and plan.   -Dr. Ovidio Burr Radiology resident called to notify of findings on CT scan consistent with metastatic lesions in abdomen and possible lymph node involvement in chest. Patient admitted to Medicine for further management and work-up of malignancy. -Dr. Burr

## 2018-02-06 NOTE — H&P ADULT - NSHPPHYSICALEXAM_GEN_ALL_CORE
Vital Signs Last 24 Hrs  T(C): 36.5 (06 Feb 2018 07:55), Max: 36.9 (06 Feb 2018 02:39)  T(F): 97.7 (06 Feb 2018 07:55), Max: 98.5 (06 Feb 2018 02:39)  HR: 99 (06 Feb 2018 07:55) (97 - 113)  BP: 120/75 (06 Feb 2018 07:55) (112/77 - 148/94)  BP(mean): --  RR: 16 (06 Feb 2018 07:55) (16 - 18)  SpO2: 96% (06 Feb 2018 07:55) (94% - 96%)    PHYSICAL EXAM:  GENERAL: NAD  HEAD:  Atraumatic, Normocephalic  EYES: conjunctiva and sclera clear  NECK: No JVD  CHEST/LUNG: CTA b/l  HEART: S1 S2 RRR  ABDOMEN: +BS Soft, RUQ tenderness, hepatomegaly  EXTREMITIES:  2+ DP Pulses, No c/c/e  NEUROLOGY: AAOx3, no focal deficits   SKIN: No rashes or lesions

## 2018-02-06 NOTE — ED PROVIDER NOTE - PHYSICAL EXAMINATION
Gen: Ill appearing, thin male in mild distress  Head: NCAT  HEENT: PERRL, MMM, normal conjunctiva, anicteric, neck supple  Lung: CTAB, no adventitious sounds  CV: RRR, no murmurs, rubs or gallops  Abd: soft, RUQ TTP; no rebound or guarding, no CVAT  MSK: No edema, no visible deformities  Neuro: No focal neurologic deficits. CN II-XII grossly intact. 5/5 strength and normal sensation in all extremities.  Skin: Warm and dry, no evidence of rash  Psych: normal mood and affect

## 2018-02-06 NOTE — CONSULT NOTE ADULT - SUBJECTIVE AND OBJECTIVE BOX
HPI:  63M h/o suspected TIA, DM2, BPH, WPW syndrome s/p ablation in 2016, p/w RUQ abdominal pain with radiation to back, decreased PO intake with 10lb weight loss, all symptoms occurring in the past 3-4 weeks. The abdominal pain is constant, originates in the epigastrium, then radiates to the RUQ and the right upper back. It is somewhat worse with food intake. He also states its "hard to burp" and has some difficulty taking down solid and liquid food recently.     Patient has not been feeling well since January 19th. He developed right arm numbness that resolved in a few days along with transient tunnel vision, prompting him to first see his cardiologist, after which he had loop recorder placed on 2/2. He then saw a neurologist and had outpatient MRI brain which showed no hemorrhage or acute stroke, but the physician wanted to schedule a followup MRI soon. Patient was told it may have been a TIA and started on plavix on 1/25/17 (he was already taking aspirin). Patient was recently started on bydureon in the past month and believed his symptoms were due to this medication, so he discontinued it prior to arrival. patient also had blood work with pmd that showed elevated LFTs, had outpatient abd us that suggested liver mets. With increasing abdominal pain, patient came to ED. pain currently 5/10. patient c/o early satiety. no fevers/chills. Some constipation but having bowel movements. He had a BM in the ED. At baseline he has BM's daily, now they occur once every 2 days on average, for the past month. No melena or hematochezia. No hematemesis.   No nausea or vomiting.   Of note, patient had colonoscopy at age 51 - normal per patient and was scheduled for colonoscopy on 3/2/18 with Dr. Tavares.   patient had psa checked 12/2017 and was normal per patient. (06 Feb 2018 08:48)  Pt stopped taking his lipitor shortly prior to this ED visit as well.     CT abdomen/pelvis in the ED showed bilateral pulmonary mets, bilateral hilar and mediastinal adenopathy, diffuse hepatic mets, enlarged gastrohepatic lymph nodes, and a possible metastatic nodule to the left adrenal gland.       PAST MEDICAL & SURGICAL HISTORY:  TIA (transient ischemic attack)  Diabetes  H/O prior ablation treatment: for wpw  Status post placement of implantable loop recorder      REVIEW OF SYSTEMS      General: +Weight loss. +Fatigue. No fevers or chills.   Skin/Breast: No rashes   Ophthalmologic: No eye pain or blurry vision.   ENMT: No sore throat  Respiratory and Thorax: + dyspnea on exertion  Cardiovascular:	No chest pain. No palpitations.   Gastrointestinal:	RUQ and epigastric pain, no N/V/D. + constipation, but having BM's. No melena or hematochezia.   Genitourinary:	No urinary complaints.   Neurological: No weakness or numbness currently.   Hematology/Lymphatics: No abnormal bleeding/bruising, or hx of clots.   Allergic/Immunologic: No hx of allergies     MEDICATIONS  (STANDING):  aspirin enteric coated 81 milliGRAM(s) Oral daily  dextrose 5%. 1000 milliLiter(s) (50 mL/Hr) IV Continuous <Continuous>  dextrose 50% Injectable 12.5 Gram(s) IV Push once  dextrose 50% Injectable 25 Gram(s) IV Push once  dextrose 50% Injectable 25 Gram(s) IV Push once  heparin  Injectable 5000 Unit(s) SubCutaneous every 8 hours  insulin lispro (HumaLOG) corrective regimen sliding scale   SubCutaneous three times a day before meals  insulin lispro (HumaLOG) corrective regimen sliding scale   SubCutaneous at bedtime  pantoprazole    Tablet 40 milliGRAM(s) Oral before breakfast  tamsulosin 0.4 milliGRAM(s) Oral at bedtime    MEDICATIONS  (PRN):  dextrose Gel 1 Dose(s) Oral once PRN Blood Glucose LESS THAN 70 milliGRAM(s)/deciliter  glucagon  Injectable 1 milliGRAM(s) IntraMuscular once PRN Glucose LESS THAN 70 milligrams/deciliter  ketorolac   Injectable 15 milliGRAM(s) IV Push every 12 hours PRN Moderate Pain (4 - 6)  oxyCODONE    IR 5 milliGRAM(s) Oral every 6 hours PRN Severe Pain (7 - 10)      Allergies    No Known Allergies    Intolerances        SOCIAL HISTORY: Pt is former smoker, quit 25 years ago. Smoked 1 ppd x 10 years.  No current alcohol or other drug use.     FAMILY HISTORY: No family history of cancer or liver disease.  Family history of CAD.       Vital Signs Last 24 Hrs  T(C): 36.5 (06 Feb 2018 07:55), Max: 36.9 (06 Feb 2018 02:39)  T(F): 97.7 (06 Feb 2018 07:55), Max: 98.5 (06 Feb 2018 02:39)  HR: 99 (06 Feb 2018 07:55) (97 - 113)  BP: 120/75 (06 Feb 2018 07:55) (112/77 - 148/94)  BP(mean): --  RR: 16 (06 Feb 2018 07:55) (16 - 18)  SpO2: 96% (06 Feb 2018 07:55) (94% - 96%)    PHYSICAL EXAM:      Constitutional: In no distress, well developed male    Eyes: No scleral icterus, clear conjunctiva, PERRL, EOMI    ENMT:    Neck:    Breasts:    Back:    Respiratory:    Cardiovascular:    Gastrointestinal:    Genitourinary:    Rectal:    Extremities:    Vascular:    Neurological:    Skin:    Lymph Nodes:    Musculoskeletal:    Psychiatric:        LABS:                        14.2   8.0   )-----------( 322      ( 06 Feb 2018 03:23 )             41.0     02-06    136  |  95<L>  |  18  ----------------------------<  97  4.1   |  26  |  1.01    Ca    9.7      06 Feb 2018 03:23    TPro  7.3  /  Alb  3.7  /  TBili  0.4  /  DBili  x   /  AST  119<H>  /  ALT  125<H>  /  AlkPhos  212<H>  02-06      Urinalysis Basic - ( 06 Feb 2018 03:55 )    Color: Yellow / Appearance: Clear / SG: >1.030 / pH: x  Gluc: x / Ketone: Trace  / Bili: Negative / Urobili: Negative   Blood: x / Protein: Trace / Nitrite: Negative   Leuk Esterase: Trace / RBC: 3-5 /HPF / WBC 6-10 /HPF   Sq Epi: x / Non Sq Epi: x / Bacteria: x        RADIOLOGY & ADDITIONAL STUDIES: HPI:  63M h/o suspected TIA, DM2, BPH, WPW syndrome s/p ablation in 2016, p/w RUQ abdominal pain with radiation to back, decreased PO intake with 10lb weight loss, all symptoms occurring in the past 3-4 weeks. The abdominal pain is constant, originates in the epigastrium, then radiates to the RUQ and the right upper back. It is somewhat worse with food intake. He also states its "hard to burp" and has some difficulty taking down solid and liquid food recently. No nausea or vomiting.     Patient has not been feeling well since January 19th. He developed right arm numbness that resolved in a few days along with transient tunnel vision, prompting him to first see his cardiologist, after which he had loop recorder placed on 2/2. He then saw a neurologist and had outpatient MRI brain which showed no hemorrhage or acute stroke, but the physician wanted to schedule a followup MRI soon. Patient was told it may have been a TIA and started on plavix on 1/25/17 (he was already taking aspirin). Patient was recently started on bydureon in the past month and believed his symptoms were due to this medication, so he discontinued it prior to arrival. patient also had blood work with pmd that showed elevated LFTs, had outpatient abd us that suggested liver mets. With increasing abdominal pain, patient came to ED. pain currently 5/10. patient c/o early satiety. no fevers/chills. Some constipation but having bowel movements. He had a BM in the ED. At baseline he has BM's daily, now they occur once every 2 days on average, for the past month. No melena or hematochezia. No hematemesis.     Of note, patient had colonoscopy at age 51 - normal per patient and was scheduled for colonoscopy on 3/2/18 with Dr. Tavares.   patient had psa checked 12/2017 and was normal per patient. (06 Feb 2018 08:48)  Pt stopped taking his lipitor shortly prior to this ED visit as well.   No hx of abdominal surgeries.     CT abdomen/pelvis in the ED showed bilateral pulmonary mets, bilateral hilar and mediastinal adenopathy, diffuse hepatic mets, enlarged gastrohepatic lymph nodes, and a possible metastatic nodule to the left adrenal gland.       PAST MEDICAL & SURGICAL HISTORY:  TIA (transient ischemic attack)  Diabetes  H/O prior ablation treatment: for wpw  Status post placement of implantable loop recorder      REVIEW OF SYSTEMS      General: +Weight loss. +Fatigue. No fevers or chills.   Skin/Breast: No rashes   Ophthalmologic: No eye pain or blurry vision.   ENMT: No sore throat  Respiratory and Thorax: + dyspnea on exertion  Cardiovascular:	No chest pain. No palpitations.   Gastrointestinal:	RUQ and epigastric pain, no N/V/D. + constipation, but having BM's. No melena or hematochezia.   Genitourinary:	No urinary complaints.   Neurological: No weakness or numbness currently.   Hematology/Lymphatics: No abnormal bleeding/bruising, or hx of clots.   Allergic/Immunologic: No hx of allergies     MEDICATIONS  (STANDING):  aspirin enteric coated 81 milliGRAM(s) Oral daily  dextrose 5%. 1000 milliLiter(s) (50 mL/Hr) IV Continuous <Continuous>  dextrose 50% Injectable 12.5 Gram(s) IV Push once  dextrose 50% Injectable 25 Gram(s) IV Push once  dextrose 50% Injectable 25 Gram(s) IV Push once  heparin  Injectable 5000 Unit(s) SubCutaneous every 8 hours  insulin lispro (HumaLOG) corrective regimen sliding scale   SubCutaneous three times a day before meals  insulin lispro (HumaLOG) corrective regimen sliding scale   SubCutaneous at bedtime  pantoprazole    Tablet 40 milliGRAM(s) Oral before breakfast  tamsulosin 0.4 milliGRAM(s) Oral at bedtime    MEDICATIONS  (PRN):  dextrose Gel 1 Dose(s) Oral once PRN Blood Glucose LESS THAN 70 milliGRAM(s)/deciliter  glucagon  Injectable 1 milliGRAM(s) IntraMuscular once PRN Glucose LESS THAN 70 milligrams/deciliter  ketorolac   Injectable 15 milliGRAM(s) IV Push every 12 hours PRN Moderate Pain (4 - 6)  oxyCODONE    IR 5 milliGRAM(s) Oral every 6 hours PRN Severe Pain (7 - 10)      Allergies    No Known Allergies    Intolerances        SOCIAL HISTORY: Pt is former smoker, quit 25 years ago. Smoked 1 ppd x 10 years.  No current alcohol or other drug use.     FAMILY HISTORY: No family history of cancer or liver disease.  Family history of CAD.       Vital Signs Last 24 Hrs  T(C): 36.5 (06 Feb 2018 07:55), Max: 36.9 (06 Feb 2018 02:39)  T(F): 97.7 (06 Feb 2018 07:55), Max: 98.5 (06 Feb 2018 02:39)  HR: 99 (06 Feb 2018 07:55) (97 - 113)  BP: 120/75 (06 Feb 2018 07:55) (112/77 - 148/94)  BP(mean): --  RR: 16 (06 Feb 2018 07:55) (16 - 18)  SpO2: 96% (06 Feb 2018 07:55) (94% - 96%)    PHYSICAL EXAM:      Constitutional: In no distress, well developed male    Eyes: No scleral icterus, clear conjunctiva, PERRL, EOMI    ENMT: No pharyngeal erythema or exudates    Neck: No anterior or posterior cervical adenopathy, no supraclavicular adenopathy.     Respiratory: CTAB, no wheezing    Cardiovascular: Regular rate and rhythm, no murmurs.     Gastrointestinal: Soft, TTP in epigastrium and RUQ, no rebound or guarding, normal bowel sounds.     Extremities: Moving all extremities    Vascular:     Neurological:    Skin:    Lymph Nodes:    Musculoskeletal:    Psychiatric:        LABS:                        14.2   8.0   )-----------( 322      ( 06 Feb 2018 03:23 )             41.0     02-06    136  |  95<L>  |  18  ----------------------------<  97  4.1   |  26  |  1.01    Ca    9.7      06 Feb 2018 03:23    TPro  7.3  /  Alb  3.7  /  TBili  0.4  /  DBili  x   /  AST  119<H>  /  ALT  125<H>  /  AlkPhos  212<H>  02-06      Urinalysis Basic - ( 06 Feb 2018 03:55 )    Color: Yellow / Appearance: Clear / SG: >1.030 / pH: x  Gluc: x / Ketone: Trace  / Bili: Negative / Urobili: Negative   Blood: x / Protein: Trace / Nitrite: Negative   Leuk Esterase: Trace / RBC: 3-5 /HPF / WBC 6-10 /HPF   Sq Epi: x / Non Sq Epi: x / Bacteria: x        RADIOLOGY & ADDITIONAL STUDIES: HPI:  63M h/o suspected TIA, DM2, BPH, WPW syndrome s/p ablation in 2016, p/w RUQ abdominal pain with radiation to back, decreased PO intake with 10lb weight loss, all symptoms occurring in the past 3-4 weeks. The abdominal pain is constant, originates in the epigastrium, then radiates to the RUQ and the right upper back. It is somewhat worse with food intake. He also states its "hard to burp" and has some difficulty taking down solid and liquid food recently. No nausea or vomiting.     Patient has not been feeling well since January 19th. He developed right arm numbness that resolved in a few days along with transient tunnel vision, prompting him to first see his cardiologist, after which he had loop recorder placed on 2/2. He then saw a neurologist and had outpatient MRI brain which showed no hemorrhage or acute stroke, but the physician wanted to schedule a followup MRI soon. Patient was told it may have been a TIA and started on plavix on 1/25/17 (he was already taking aspirin). Patient was recently started on bydureon in the past month and believed his symptoms were due to this medication, so he discontinued it prior to arrival. patient also had blood work with pmd that showed elevated LFTs, had outpatient abd us that suggested liver mets. With increasing abdominal pain, patient came to ED. pain currently 5/10. patient c/o early satiety. no fevers/chills. Some constipation but having bowel movements. He had a BM in the ED. At baseline he has BM's daily, now they occur once every 2 days on average, for the past month. No melena or hematochezia. No hematemesis.     Of note, patient had colonoscopy at age 51 - normal per patient and was scheduled for colonoscopy on 3/2/18 with Dr. Tavares.   patient had psa checked 12/2017 and was normal per patient. (06 Feb 2018 08:48)  Pt stopped taking his lipitor shortly prior to this ED visit as well.   No hx of abdominal surgeries.     CT abdomen/pelvis in the ED showed bilateral pulmonary mets, bilateral hilar and mediastinal adenopathy, diffuse hepatic mets, enlarged gastrohepatic lymph nodes, and a possible metastatic nodule to the left adrenal gland.       PAST MEDICAL & SURGICAL HISTORY:  TIA (transient ischemic attack)  Diabetes  H/O prior ablation treatment: for wpw  Status post placement of implantable loop recorder      REVIEW OF SYSTEMS      General: +Weight loss. +Fatigue. No fevers or chills.   Skin/Breast: No rashes   Ophthalmologic: No eye pain or blurry vision.   ENMT: No sore throat  Respiratory and Thorax: + dyspnea on exertion  Cardiovascular:	No chest pain. No palpitations.   Gastrointestinal:	RUQ and epigastric pain, no N/V/D. + constipation, but having BM's. No melena or hematochezia.   Genitourinary:	No urinary complaints.   Neurological: No weakness or numbness currently.   Hematology/Lymphatics: No abnormal bleeding/bruising, or hx of clots.   Allergic/Immunologic: No hx of allergies     MEDICATIONS  (STANDING):  aspirin enteric coated 81 milliGRAM(s) Oral daily  dextrose 5%. 1000 milliLiter(s) (50 mL/Hr) IV Continuous <Continuous>  dextrose 50% Injectable 12.5 Gram(s) IV Push once  dextrose 50% Injectable 25 Gram(s) IV Push once  dextrose 50% Injectable 25 Gram(s) IV Push once  heparin  Injectable 5000 Unit(s) SubCutaneous every 8 hours  insulin lispro (HumaLOG) corrective regimen sliding scale   SubCutaneous three times a day before meals  insulin lispro (HumaLOG) corrective regimen sliding scale   SubCutaneous at bedtime  pantoprazole    Tablet 40 milliGRAM(s) Oral before breakfast  tamsulosin 0.4 milliGRAM(s) Oral at bedtime    MEDICATIONS  (PRN):  dextrose Gel 1 Dose(s) Oral once PRN Blood Glucose LESS THAN 70 milliGRAM(s)/deciliter  glucagon  Injectable 1 milliGRAM(s) IntraMuscular once PRN Glucose LESS THAN 70 milligrams/deciliter  ketorolac   Injectable 15 milliGRAM(s) IV Push every 12 hours PRN Moderate Pain (4 - 6)  oxyCODONE    IR 5 milliGRAM(s) Oral every 6 hours PRN Severe Pain (7 - 10)      Allergies    No Known Allergies    Intolerances        SOCIAL HISTORY: Pt is former smoker, quit 25 years ago. Smoked 1 ppd x 10 years.  No current alcohol or other drug use.     FAMILY HISTORY: No family history of cancer or liver disease.  Family history of CAD.       Vital Signs Last 24 Hrs  T(C): 36.5 (06 Feb 2018 07:55), Max: 36.9 (06 Feb 2018 02:39)  T(F): 97.7 (06 Feb 2018 07:55), Max: 98.5 (06 Feb 2018 02:39)  HR: 99 (06 Feb 2018 07:55) (97 - 113)  BP: 120/75 (06 Feb 2018 07:55) (112/77 - 148/94)  BP(mean): --  RR: 16 (06 Feb 2018 07:55) (16 - 18)  SpO2: 96% (06 Feb 2018 07:55) (94% - 96%)    PHYSICAL EXAM:      Constitutional: In no distress, well developed male  Eyes: No scleral icterus, clear conjunctiva, PERRL, EOMI  ENMT: No pharyngeal erythema or exudates  Neck: No anterior or posterior cervical adenopathy, no supraclavicular adenopathy.   Respiratory: CTAB, no wheezing  Cardiovascular: Regular rate and rhythm, no murmurs.   Gastrointestinal: Soft, TTP in epigastrium and RUQ, no rebound or guarding, normal bowel sounds.   Extremities: Moving all extremities  Vascular: 2+ pulses  Neurological: No focal deficits  Skin: No rashes noted        LABS:                        14.2   8.0   )-----------( 322      ( 06 Feb 2018 03:23 )             41.0     02-06    136  |  95<L>  |  18  ----------------------------<  97  4.1   |  26  |  1.01    Ca    9.7      06 Feb 2018 03:23    TPro  7.3  /  Alb  3.7  /  TBili  0.4  /  DBili  x   /  AST  119<H>  /  ALT  125<H>  /  AlkPhos  212<H>  02-06      Urinalysis Basic - ( 06 Feb 2018 03:55 )    Color: Yellow / Appearance: Clear / SG: >1.030 / pH: x  Gluc: x / Ketone: Trace  / Bili: Negative / Urobili: Negative   Blood: x / Protein: Trace / Nitrite: Negative   Leuk Esterase: Trace / RBC: 3-5 /HPF / WBC 6-10 /HPF   Sq Epi: x / Non Sq Epi: x / Bacteria: x        RADIOLOGY & ADDITIONAL STUDIES:

## 2018-02-06 NOTE — H&P ADULT - PROBLEM SELECTOR PLAN 2
no s/s of bleeding at this time,   though LFTs elevated, no s/s of liver failure - check coags  toradol prn, oxycodone prn.

## 2018-02-07 LAB
AFP-TM SERPL-MCNC: 1060 NG/ML — HIGH
ALBUMIN SERPL ELPH-MCNC: 3.4 G/DL — SIGNIFICANT CHANGE UP (ref 3.3–5)
ALP SERPL-CCNC: 224 U/L — HIGH (ref 40–120)
ALT FLD-CCNC: 131 U/L — HIGH (ref 10–45)
ANION GAP SERPL CALC-SCNC: 14 MMOL/L — SIGNIFICANT CHANGE UP (ref 5–17)
AST SERPL-CCNC: 157 U/L — HIGH (ref 10–40)
BASOPHILS # BLD AUTO: 0.07 K/UL — SIGNIFICANT CHANGE UP (ref 0–0.2)
BASOPHILS NFR BLD AUTO: 1 % — SIGNIFICANT CHANGE UP (ref 0–2)
BILIRUB SERPL-MCNC: 0.5 MG/DL — SIGNIFICANT CHANGE UP (ref 0.2–1.2)
BUN SERPL-MCNC: 10 MG/DL — SIGNIFICANT CHANGE UP (ref 7–23)
CALCIUM SERPL-MCNC: 9.4 MG/DL — SIGNIFICANT CHANGE UP (ref 8.4–10.5)
CANCER AG19-9 SERPL-ACNC: 206.4 U/ML — HIGH
CHLORIDE SERPL-SCNC: 97 MMOL/L — SIGNIFICANT CHANGE UP (ref 96–108)
CO2 SERPL-SCNC: 26 MMOL/L — SIGNIFICANT CHANGE UP (ref 22–31)
CREAT SERPL-MCNC: 0.77 MG/DL — SIGNIFICANT CHANGE UP (ref 0.5–1.3)
EOSINOPHIL # BLD AUTO: 0.3 K/UL — SIGNIFICANT CHANGE UP (ref 0–0.5)
EOSINOPHIL NFR BLD AUTO: 4.3 % — SIGNIFICANT CHANGE UP (ref 0–6)
GLUCOSE BLDC GLUCOMTR-MCNC: 128 MG/DL — HIGH (ref 70–99)
GLUCOSE BLDC GLUCOMTR-MCNC: 131 MG/DL — HIGH (ref 70–99)
GLUCOSE BLDC GLUCOMTR-MCNC: 73 MG/DL — SIGNIFICANT CHANGE UP (ref 70–99)
GLUCOSE SERPL-MCNC: 103 MG/DL — HIGH (ref 70–99)
HAV IGM SER-ACNC: SIGNIFICANT CHANGE UP
HBA1C BLD-MCNC: 6.7 % — HIGH (ref 4–5.6)
HBV CORE IGM SER-ACNC: SIGNIFICANT CHANGE UP
HBV SURFACE AG SER-ACNC: SIGNIFICANT CHANGE UP
HCT VFR BLD CALC: 40.5 % — SIGNIFICANT CHANGE UP (ref 39–50)
HCV AB S/CO SERPL IA: 0.22 S/CO — SIGNIFICANT CHANGE UP
HCV AB SERPL-IMP: SIGNIFICANT CHANGE UP
HGB BLD-MCNC: 13.4 G/DL — SIGNIFICANT CHANGE UP (ref 13–17)
IMM GRANULOCYTES NFR BLD AUTO: 0.1 % — SIGNIFICANT CHANGE UP (ref 0–1.5)
LDH SERPL L TO P-CCNC: 2970 — SIGNIFICANT CHANGE UP
LYMPHOCYTES # BLD AUTO: 1.18 K/UL — SIGNIFICANT CHANGE UP (ref 1–3.3)
LYMPHOCYTES # BLD AUTO: 17 % — SIGNIFICANT CHANGE UP (ref 13–44)
MAGNESIUM SERPL-MCNC: 2.1 MG/DL — SIGNIFICANT CHANGE UP (ref 1.6–2.6)
MCHC RBC-ENTMCNC: 27.4 PG — SIGNIFICANT CHANGE UP (ref 27–34)
MCHC RBC-ENTMCNC: 33.1 GM/DL — SIGNIFICANT CHANGE UP (ref 32–36)
MCV RBC AUTO: 82.8 FL — SIGNIFICANT CHANGE UP (ref 80–100)
MONOCYTES # BLD AUTO: 0.77 K/UL — SIGNIFICANT CHANGE UP (ref 0–0.9)
MONOCYTES NFR BLD AUTO: 11.1 % — SIGNIFICANT CHANGE UP (ref 2–14)
NEUTROPHILS # BLD AUTO: 4.62 K/UL — SIGNIFICANT CHANGE UP (ref 1.8–7.4)
NEUTROPHILS NFR BLD AUTO: 66.5 % — SIGNIFICANT CHANGE UP (ref 43–77)
OB PNL STL: NEGATIVE — SIGNIFICANT CHANGE UP
PHOSPHATE SERPL-MCNC: 3.1 MG/DL — SIGNIFICANT CHANGE UP (ref 2.5–4.5)
PLATELET # BLD AUTO: 368 K/UL — SIGNIFICANT CHANGE UP (ref 150–400)
POTASSIUM SERPL-MCNC: 4.9 MMOL/L — SIGNIFICANT CHANGE UP (ref 3.5–5.3)
POTASSIUM SERPL-SCNC: 4.9 MMOL/L — SIGNIFICANT CHANGE UP (ref 3.5–5.3)
PROT SERPL-MCNC: 7.5 G/DL — SIGNIFICANT CHANGE UP (ref 6–8.3)
PSA FLD-MCNC: 2.87 NG/ML — SIGNIFICANT CHANGE UP (ref 0–4)
RBC # BLD: 4.89 M/UL — SIGNIFICANT CHANGE UP (ref 4.2–5.8)
RBC # FLD: 14.6 % — HIGH (ref 10.3–14.5)
SODIUM SERPL-SCNC: 137 MMOL/L — SIGNIFICANT CHANGE UP (ref 135–145)
WBC # BLD: 6.95 K/UL — SIGNIFICANT CHANGE UP (ref 3.8–10.5)
WBC # FLD AUTO: 6.95 K/UL — SIGNIFICANT CHANGE UP (ref 3.8–10.5)

## 2018-02-07 PROCEDURE — 99232 SBSQ HOSP IP/OBS MODERATE 35: CPT | Mod: GC

## 2018-02-07 PROCEDURE — 99233 SBSQ HOSP IP/OBS HIGH 50: CPT

## 2018-02-07 RX ADMIN — TAMSULOSIN HYDROCHLORIDE 0.4 MILLIGRAM(S): 0.4 CAPSULE ORAL at 21:49

## 2018-02-07 RX ADMIN — HEPARIN SODIUM 5000 UNIT(S): 5000 INJECTION INTRAVENOUS; SUBCUTANEOUS at 15:22

## 2018-02-07 RX ADMIN — OXYCODONE HYDROCHLORIDE 5 MILLIGRAM(S): 5 TABLET ORAL at 15:19

## 2018-02-07 RX ADMIN — Medication 15 MILLIGRAM(S): at 05:23

## 2018-02-07 RX ADMIN — Medication 15 MILLIGRAM(S): at 20:09

## 2018-02-07 RX ADMIN — PANTOPRAZOLE SODIUM 40 MILLIGRAM(S): 20 TABLET, DELAYED RELEASE ORAL at 05:35

## 2018-02-07 RX ADMIN — HEPARIN SODIUM 5000 UNIT(S): 5000 INJECTION INTRAVENOUS; SUBCUTANEOUS at 05:37

## 2018-02-07 RX ADMIN — Medication 15 MILLIGRAM(S): at 05:53

## 2018-02-07 RX ADMIN — Medication 15 MILLIGRAM(S): at 20:25

## 2018-02-07 NOTE — PROGRESS NOTE ADULT - SUBJECTIVE AND OBJECTIVE BOX
Patient is a 62y old  Male who presents with a chief complaint of Right flank abdominal pain (06 Feb 2018 14:15)      SUBJECTIVE / OVERNIGHT EVENTS: Patient complaining of diffuse abdominal pain, manageable on pain medications, loss of appetite.  ROS otherwise negative.   No events over night.       T(C): 36.5 (02-07-18 @ 05:18), Max: 36.5 (02-07-18 @ 05:18)  HR: 100 (02-07-18 @ 05:18) (100 - 100)  BP: 125/85 (02-07-18 @ 05:18) (125/85 - 125/85)  RR: 18 (02-07-18 @ 05:18) (18 - 18)  SpO2: 93% (02-07-18 @ 05:18) (93% - 93%)    MEDICATIONS  (STANDING):  dextrose 5%. 1000 milliLiter(s) (50 mL/Hr) IV Continuous <Continuous>  dextrose 50% Injectable 12.5 Gram(s) IV Push once  dextrose 50% Injectable 25 Gram(s) IV Push once  dextrose 50% Injectable 25 Gram(s) IV Push once  heparin  Injectable 5000 Unit(s) SubCutaneous every 8 hours  insulin lispro (HumaLOG) corrective regimen sliding scale   SubCutaneous three times a day before meals  insulin lispro (HumaLOG) corrective regimen sliding scale   SubCutaneous at bedtime  pantoprazole    Tablet 40 milliGRAM(s) Oral before breakfast  tamsulosin 0.4 milliGRAM(s) Oral at bedtime    MEDICATIONS  (PRN):  dextrose Gel 1 Dose(s) Oral once PRN Blood Glucose LESS THAN 70 milliGRAM(s)/deciliter  glucagon  Injectable 1 milliGRAM(s) IntraMuscular once PRN Glucose LESS THAN 70 milligrams/deciliter  ketorolac   Injectable 15 milliGRAM(s) IV Push every 12 hours PRN Moderate Pain (4 - 6)  oxyCODONE    IR 5 milliGRAM(s) Oral every 6 hours PRN Severe Pain (7 - 10)        PHYSICAL EXAM:  GENERAL: NAD, well-developed  HEAD:  Atraumatic, Normocephalic  EYES: EOMI, conjunctiva and sclera clear  NECK: Supple, No JVD  CHEST/LUNG: Clear to auscultation bilaterally; No wheeze  HEART: Regular rate and rhythm; No murmurs, rubs, or gallops  ABDOMEN: Soft, Nontender, Nondistended; Bowel sounds present  EXTREMITIES:  2+ Peripheral Pulses, No clubbing, cyanosis, or edema  PSYCH: AAOx3  NEUROLOGY: non-focal  SKIN: No rashes or lesions                          13.4   6.95  )-----------( 368      ( 07 Feb 2018 07:27 )             40.5           LIVER FUNCTIONS - ( 07 Feb 2018 07:33 )  Alb: 3.4 g/dL / Pro: 7.5 g/dL / ALK PHOS: 224 U/L / ALT: 131 U/L / AST: 157 U/L / GGT: x           PT/INR - ( 06 Feb 2018 12:54 )   PT: 12.3 sec;   INR: 1.13 ratio         PTT - ( 06 Feb 2018 12:54 )  PTT:27.2 sec  137|97|10<103  4.9|26|0.77  9.4,2.1,3.1  02-07 @ 07:33   .4   CA 19-9 206.4     81      RADIOLOGY & ADDITIONAL TESTS:    Imaging Personally Reviewed:    Consultant(s) Notes Reviewed:  GI, Pulmonary, Onc     Care Discussed with Consultants/Other Providers:

## 2018-02-07 NOTE — PROGRESS NOTE ADULT - ASSESSMENT
62 year old male with DMII, BPH, WPW syndrome s/p ablation, suspected TIA in 1/2018, p/w 3-4 weeks of worsening epigastric/RUQ pain radiating to the back, along with early satiety, dysphagia to solids and liquids, poor PO intake and ~10 pound weight loss.     #Abdominal pain, early satiety, weight loss  - likely 2/2 malignancy as seen on CT abdomen/pelvis, with unknown primary source.   - multiple hepatic and pulmonary nodules seen, strongly suggestive of malignancy.  - elevated LFT's likely 2/2 hepatic nodules.   Differential includes gastric, esophageal or colon malignancy. Upper GI malignancy more likely given symptoms of early satiety, dysphagia, pulmonary mets, etc.    Plan for upper endoscopy tomorrow 2/8. Keep NPO after midnight.

## 2018-02-07 NOTE — PROGRESS NOTE ADULT - SUBJECTIVE AND OBJECTIVE BOX
Pt seen and examined at bedside.  Pt has no additional complaints at this time, ambulating, comfortable. No nausea, vomiting, diarrhea or fevers.  Tolerating diet.     REVIEW OF SYSTEMS:  Constitutional: +Weight loss and fatigue   Cardiovascular: No chest pain, palpitations, dizziness or leg swelling  Gastrointestinal: + abdominal pain. No nausea, vomiting or hematemesis; No diarrhea, mild constipation. No melena or hematochezia.  Skin: No itching, burning, rashes or lesions     Allergies    No Known Allergies    Intolerances        MEDICATIONS:  MEDICATIONS  (STANDING):  dextrose 5%. 1000 milliLiter(s) (50 mL/Hr) IV Continuous <Continuous>  dextrose 50% Injectable 12.5 Gram(s) IV Push once  dextrose 50% Injectable 25 Gram(s) IV Push once  dextrose 50% Injectable 25 Gram(s) IV Push once  heparin  Injectable 5000 Unit(s) SubCutaneous every 8 hours  insulin lispro (HumaLOG) corrective regimen sliding scale   SubCutaneous three times a day before meals  insulin lispro (HumaLOG) corrective regimen sliding scale   SubCutaneous at bedtime  pantoprazole    Tablet 40 milliGRAM(s) Oral before breakfast  tamsulosin 0.4 milliGRAM(s) Oral at bedtime    MEDICATIONS  (PRN):  dextrose Gel 1 Dose(s) Oral once PRN Blood Glucose LESS THAN 70 milliGRAM(s)/deciliter  glucagon  Injectable 1 milliGRAM(s) IntraMuscular once PRN Glucose LESS THAN 70 milligrams/deciliter  ketorolac   Injectable 15 milliGRAM(s) IV Push every 12 hours PRN Moderate Pain (4 - 6)  oxyCODONE    IR 5 milliGRAM(s) Oral every 6 hours PRN Severe Pain (7 - 10)      Vital Signs Last 24 Hrs  T(C): 36.5 (07 Feb 2018 05:18), Max: 36.7 (06 Feb 2018 21:34)  T(F): 97.7 (07 Feb 2018 05:18), Max: 98 (06 Feb 2018 21:34)  HR: 100 (07 Feb 2018 05:18) (96 - 100)  BP: 125/85 (07 Feb 2018 05:18) (111/78 - 137/82)  BP(mean): --  RR: 18 (07 Feb 2018 05:18) (16 - 18)  SpO2: 93% (07 Feb 2018 05:18) (93% - 98%)    02-06 @ 07:01  -  02-07 @ 07:00  --------------------------------------------------------  IN: 480 mL / OUT: 0 mL / NET: 480 mL        PHYSICAL EXAM:    General: Ambulatory, in no acute distress  HEENT: MMM, conjunctiva and sclera clear  Gastrointestinal: mild TTP in RUQ; Normal bowel sounds; No hepatosplenomegaly. No rebound or guarding  Skin: Warm and dry. No obvious rash    LABS:  CBC Full  -  ( 06 Feb 2018 03:23 )  WBC Count : 8.0 K/uL  Hemoglobin : 14.2 g/dL  Hematocrit : 41.0 %  Platelet Count - Automated : 322 K/uL  Mean Cell Volume : 85.0 fl  Mean Cell Hemoglobin : 29.5 pg  Mean Cell Hemoglobin Concentration : 34.7 gm/dL  Auto Neutrophil # : 5.2 K/uL  Auto Lymphocyte # : 1.4 K/uL  Auto Monocyte # : 1.0 K/uL  Auto Eosinophil # : 0.2 K/uL  Auto Basophil # : 0.1 K/uL  Auto Neutrophil % : 66.0 %  Auto Lymphocyte % : 17.0 %  Auto Monocyte % : 12.9 %  Auto Eosinophil % : 3.0 %  Auto Basophil % : 1.1 %    02-07    137  |  97  |  10  ----------------------------<  103<H>  4.9   |  26  |  0.77    Ca    9.4      07 Feb 2018 07:33  Phos  3.1     02-07  Mg     2.1     02-07    TPro  7.5  /  Alb  3.4  /  TBili  0.5  /  DBili  x   /  AST  157<H>  /  ALT  131<H>  /  AlkPhos  224<H>  02-07    PT/INR - ( 06 Feb 2018 12:54 )   PT: 12.3 sec;   INR: 1.13 ratio         PTT - ( 06 Feb 2018 12:54 )  PTT:27.2 sec      Urinalysis Basic - ( 06 Feb 2018 03:55 )    Color: Yellow / Appearance: Clear / SG: >1.030 / pH: x  Gluc: x / Ketone: Trace  / Bili: Negative / Urobili: Negative   Blood: x / Protein: Trace / Nitrite: Negative   Leuk Esterase: Trace / RBC: 3-5 /HPF / WBC 6-10 /HPF   Sq Epi: x / Non Sq Epi: x / Bacteria: x                RADIOLOGY & ADDITIONAL STUDIES:

## 2018-02-07 NOTE — PROGRESS NOTE ADULT - SUBJECTIVE AND OBJECTIVE BOX
Patient seen and evaluated.  No interventions needed before surgery.  NPO after midnight.  Full pre-operative evaluation to follow on day of surgery.    Kade Meza MD  Attending Anesthesiologist  [phone]

## 2018-02-07 NOTE — PROGRESS NOTE ADULT - ATTENDING COMMENTS
Imaging suspicious for metastatic disease  Prominent upper GI complaints, dysphagia, early satiety  Imaging ? extrinsic sierra compression of the esophagus vs intrinsic disease  EGD tomorrow

## 2018-02-08 ENCOUNTER — RESULT REVIEW (OUTPATIENT)
Age: 63
End: 2018-02-08

## 2018-02-08 ENCOUNTER — TRANSCRIPTION ENCOUNTER (OUTPATIENT)
Age: 63
End: 2018-02-08

## 2018-02-08 LAB
ALBUMIN SERPL ELPH-MCNC: 3 G/DL — LOW (ref 3.3–5)
ALP SERPL-CCNC: 225 U/L — HIGH (ref 40–120)
ALT FLD-CCNC: 146 U/L — HIGH (ref 10–45)
ANION GAP SERPL CALC-SCNC: 16 MMOL/L — SIGNIFICANT CHANGE UP (ref 5–17)
AST SERPL-CCNC: 171 U/L — HIGH (ref 10–40)
BILIRUB SERPL-MCNC: 0.3 MG/DL — SIGNIFICANT CHANGE UP (ref 0.2–1.2)
BUN SERPL-MCNC: 12 MG/DL — SIGNIFICANT CHANGE UP (ref 7–23)
CALCIUM SERPL-MCNC: 9.4 MG/DL — SIGNIFICANT CHANGE UP (ref 8.4–10.5)
CHLORIDE SERPL-SCNC: 97 MMOL/L — SIGNIFICANT CHANGE UP (ref 96–108)
CO2 SERPL-SCNC: 25 MMOL/L — SIGNIFICANT CHANGE UP (ref 22–31)
CREAT SERPL-MCNC: 1.03 MG/DL — SIGNIFICANT CHANGE UP (ref 0.5–1.3)
GLUCOSE BLDC GLUCOMTR-MCNC: 111 MG/DL — HIGH (ref 70–99)
GLUCOSE BLDC GLUCOMTR-MCNC: 142 MG/DL — HIGH (ref 70–99)
GLUCOSE BLDC GLUCOMTR-MCNC: 218 MG/DL — HIGH (ref 70–99)
GLUCOSE BLDC GLUCOMTR-MCNC: 90 MG/DL — SIGNIFICANT CHANGE UP (ref 70–99)
GLUCOSE SERPL-MCNC: 102 MG/DL — HIGH (ref 70–99)
HCT VFR BLD CALC: 39.9 % — SIGNIFICANT CHANGE UP (ref 39–50)
HGB BLD-MCNC: 12.8 G/DL — LOW (ref 13–17)
MCHC RBC-ENTMCNC: 26.9 PG — LOW (ref 27–34)
MCHC RBC-ENTMCNC: 32.1 GM/DL — SIGNIFICANT CHANGE UP (ref 32–36)
MCV RBC AUTO: 83.8 FL — SIGNIFICANT CHANGE UP (ref 80–100)
PLATELET # BLD AUTO: 333 K/UL — SIGNIFICANT CHANGE UP (ref 150–400)
POTASSIUM SERPL-MCNC: 4.8 MMOL/L — SIGNIFICANT CHANGE UP (ref 3.5–5.3)
POTASSIUM SERPL-SCNC: 4.8 MMOL/L — SIGNIFICANT CHANGE UP (ref 3.5–5.3)
PROT SERPL-MCNC: 6.9 G/DL — SIGNIFICANT CHANGE UP (ref 6–8.3)
RBC # BLD: 4.76 M/UL — SIGNIFICANT CHANGE UP (ref 4.2–5.8)
RBC # FLD: 14.2 % — SIGNIFICANT CHANGE UP (ref 10.3–14.5)
SODIUM SERPL-SCNC: 138 MMOL/L — SIGNIFICANT CHANGE UP (ref 135–145)
WBC # BLD: 6.55 K/UL — SIGNIFICANT CHANGE UP (ref 3.8–10.5)
WBC # FLD AUTO: 6.55 K/UL — SIGNIFICANT CHANGE UP (ref 3.8–10.5)

## 2018-02-08 PROCEDURE — 88305 TISSUE EXAM BY PATHOLOGIST: CPT | Mod: 26

## 2018-02-08 PROCEDURE — 43239 EGD BIOPSY SINGLE/MULTIPLE: CPT | Mod: GC

## 2018-02-08 PROCEDURE — 99233 SBSQ HOSP IP/OBS HIGH 50: CPT

## 2018-02-08 PROCEDURE — 88360 TUMOR IMMUNOHISTOCHEM/MANUAL: CPT | Mod: 26

## 2018-02-08 RX ADMIN — TAMSULOSIN HYDROCHLORIDE 0.4 MILLIGRAM(S): 0.4 CAPSULE ORAL at 22:14

## 2018-02-08 RX ADMIN — Medication 15 MILLIGRAM(S): at 15:30

## 2018-02-08 RX ADMIN — PANTOPRAZOLE SODIUM 40 MILLIGRAM(S): 20 TABLET, DELAYED RELEASE ORAL at 05:14

## 2018-02-08 RX ADMIN — Medication 15 MILLIGRAM(S): at 16:00

## 2018-02-08 NOTE — DISCHARGE NOTE ADULT - HOSPITAL COURSE
63M h/o TIA, DM2, BPH p/w RUQ abdominal pain with radiation to back, decreased po intake with 10lb weight loss.  Patient also had blood work with pmd that showed elevated LFTs, had outpatient abd us that suggested liver mets; CT abdomen showed liver nodules, lung nodules, adrenal nodule, and lymphadenopathy. s/p EGD showing Partially obstructing esophageal mass in the lower third of the esophagus, which was biopsied. Pulmonary/oncology/GI consulted. 63M h/o TIA, DM2, BPH p/w RUQ abdominal pain with radiation to back, decreased po intake with 10lb weight loss.  Patient also had blood work with pmd that showed elevated LFTs, had outpatient abd us that suggested liver mets; CT abdomen showed liver nodules, lung nodules, adrenal nodule, and lymphadenopathy. s/p EGD showing Partially obstructing esophageal mass in the lower third of the esophagus, which was biopsied. Pulmonary/oncology/GI consulted. Pain controlled on PO pain meds. Pt will follow up with Oncology early next week for follow up. 63M h/o TIA, DM2, BPH p/w RUQ abdominal pain with radiation to back, decreased po intake with 10lb weight loss.  Patient also had blood work with pmd that showed elevated LFTs, had outpatient abd us that suggested liver mets; CT abdomen showed liver nodules, lung nodules, adrenal nodule, and lymphadenopathy. s/p EGD showing Partially obstructing esophageal mass in the lower third of the esophagus, which was biopsied. Aspirin/Plavix held for possible biopsies later on.  Pulmonary/oncology/GI consulted. Pain controlled on PO pain meds. Pt will follow up with Oncology early next week for follow up.

## 2018-02-08 NOTE — CHART NOTE - NSCHARTNOTEFT_GEN_A_CORE
Notified by RN that patient will not be able to go down for MRI of the brain to r/o metastatic disease without a cardiology interrogation of the patients loop recorder. Patient is stable.     - Case d/w night hospitalist who states the MRI can be preformed in the morning.  - D/w Cardiology fellow on call who states only urgent loop recorder interrogations occur at night.  - Will endorse to day team   - will continue to monitor     Guicho BARCLAY   Medicine

## 2018-02-08 NOTE — DISCHARGE NOTE ADULT - PATIENT PORTAL LINK FT
You can access the [facility] Patient Portal, offered by [facility], by registering with the following website: [email or web address]

## 2018-02-08 NOTE — DISCHARGE NOTE ADULT - CARE PLAN
Principal Discharge DX:	Metastatic disease  Secondary Diagnosis:	Esophageal mass  Secondary Diagnosis:	Acute pain  Secondary Diagnosis:	TIA (transient ischemic attack)  Secondary Diagnosis:	Type 2 diabetes mellitus without complication, unspecified long term insulin use status Principal Discharge DX:	Metastatic disease  Goal:	start treatement for malignancy  Assessment and plan of treatment:	Follow up with Oncologist early next week  Secondary Diagnosis:	Esophageal mass  Assessment and plan of treatment:	You had biopsy done  Follow up with GI specialist in 1 week  Secondary Diagnosis:	Acute pain  Assessment and plan of treatment:	continue oxycodone as needed  Secondary Diagnosis:	TIA (transient ischemic attack)  Assessment and plan of treatment:	Hold off aspirin/plavix for now  Follow up with Neurologist  Secondary Diagnosis:	Type 2 diabetes mellitus without complication, unspecified long term insulin use status  Assessment and plan of treatment:	HgA1C 6.7  continue home medications  check blood sugar at least 2 x daily  Follow up with Endocrinology Principal Discharge DX:	Metastatic disease  Goal:	start treatement for malignancy  Assessment and plan of treatment:	Follow up with Oncologist early next week  Secondary Diagnosis:	Esophageal mass  Assessment and plan of treatment:	You had biopsy done  Follow up with GI specialist in 1 week  Secondary Diagnosis:	Acute pain  Assessment and plan of treatment:	continue oxycodone as needed  Secondary Diagnosis:	TIA (transient ischemic attack)  Assessment and plan of treatment:	Hold off aspirin/plavix for now  Follow up with Neurologist  Secondary Diagnosis:	Type 2 diabetes mellitus without complication, unspecified long term insulin use status  Assessment and plan of treatment:	HgA1C 6.7  continue home medications except Bydureon   check blood sugar at least 2 x daily  Follow up with Endocrinology  Secondary Diagnosis:	Elevated liver enzymes  Assessment and plan of treatment:	Hold lipitor for now  Follow up with PMD

## 2018-02-08 NOTE — PROGRESS NOTE ADULT - SUBJECTIVE AND OBJECTIVE BOX
Pre-Endoscopy Evaluation      Referring Physician: Dr. Briceno                                 Procedure: EGD    Indication for Procedure: Abdominal pain, early satiety, weight loss, Abnormal imaging    Pertinent History: 62 year old male with DMII, BPH, WPW syndrome s/p ablation, suspected TIA in 1/2018, p/w 3-4 weeks of worsening epigastric/RUQ pain radiating to the back, along with early satiety, dysphagia to solids and liquids, poor PO intake and ~10 pound weight loss.       Sedation by Anesthesia [X]    PAST MEDICAL & SURGICAL HISTORY:  TIA (transient ischemic attack)  Diabetes  H/O prior ablation treatment: for wpw  Status post placement of implantable loop recorder      PMH of Gastroparesis [ ]  Gastric Surgery [ ]  Gastric Outlet Obstruction [ ]    Allergies:    No Known Allergies    Intolerances:    Latex allergy: [ ] yes [X] no    Medications:MEDICATIONS  (STANDING):  dextrose 5%. 1000 milliLiter(s) (50 mL/Hr) IV Continuous <Continuous>  dextrose 50% Injectable 12.5 Gram(s) IV Push once  dextrose 50% Injectable 25 Gram(s) IV Push once  dextrose 50% Injectable 25 Gram(s) IV Push once  heparin  Injectable 5000 Unit(s) SubCutaneous every 8 hours  insulin lispro (HumaLOG) corrective regimen sliding scale   SubCutaneous three times a day before meals  insulin lispro (HumaLOG) corrective regimen sliding scale   SubCutaneous at bedtime  pantoprazole    Tablet 40 milliGRAM(s) Oral before breakfast  tamsulosin 0.4 milliGRAM(s) Oral at bedtime    MEDICATIONS  (PRN):  dextrose Gel 1 Dose(s) Oral once PRN Blood Glucose LESS THAN 70 milliGRAM(s)/deciliter  glucagon  Injectable 1 milliGRAM(s) IntraMuscular once PRN Glucose LESS THAN 70 milligrams/deciliter  ketorolac   Injectable 15 milliGRAM(s) IV Push every 12 hours PRN Moderate Pain (4 - 6)  oxyCODONE    IR 5 milliGRAM(s) Oral every 6 hours PRN Severe Pain (7 - 10)      Smoking: [ ] yes  [X] no    AICD/PPM: [ ] yes   [X] no    Pertinent lab data:                        12.8   6.55  )-----------( 333      ( 08 Feb 2018 07:34 )             39.9     02-08    138  |  97  |  12  ----------------------------<  102<H>  4.8   |  25  |  1.03    Ca    9.4      08 Feb 2018 07:43  Phos  3.1     02-07  Mg     2.1     02-07    TPro  6.9  /  Alb  3.0<L>  /  TBili  0.3  /  DBili  x   /  AST  171<H>  /  ALT  146<H>  /  AlkPhos  225<H>  02-08    PT/INR - ( 06 Feb 2018 12:54 )   PT: 12.3 sec;   INR: 1.13 ratio    PTT - ( 06 Feb 2018 12:54 )  PTT:27.2 sec      Physical Examination:    Daily   Vital Signs Last 24 Hrs  T(C): 36.6 (08 Feb 2018 05:02), Max: 36.7 (07 Feb 2018 14:02)  T(F): 97.8 (08 Feb 2018 05:02), Max: 98 (07 Feb 2018 14:02)  HR: 107 (08 Feb 2018 05:02) (95 - 107)  BP: 108/68 (08 Feb 2018 05:02) (108/68 - 124/82)  BP(mean): --  RR: 16 (08 Feb 2018 05:02) (16 - 18)  SpO2: 96% (08 Feb 2018 05:02) (95% - 96%)    POC blood glucose test 111mg/dl at 0800    Constitutional: NAD  Neck:  No JVD  Respiratory: CTAB/L  Cardiovascular: S1 and S2  Gastrointestinal: BS+, soft, NT/ND  Extremities: No peripheral edema  Neurological: A/O x 3, no focal deficits  : No Tovar  Skin: No rashes    Comments:    ASA Class: I [ ]  II [ ]  III [X]  IV [ ]    The patient is a suitable candidate for the planned procedure unless box checked [ ]  No, explain:

## 2018-02-08 NOTE — DISCHARGE NOTE ADULT - SECONDARY DIAGNOSIS.
Type 2 diabetes mellitus without complication, unspecified long term insulin use status Esophageal mass Acute pain TIA (transient ischemic attack) Elevated liver enzymes

## 2018-02-08 NOTE — DISCHARGE NOTE ADULT - ADDITIONAL INSTRUCTIONS
Foolow up with Oncology on 2/12 or 2/13/18; Office will call you with an appointment  Follow up with GI specialist in 1 week  Follow up with neurologist

## 2018-02-08 NOTE — DISCHARGE NOTE ADULT - MEDICATION SUMMARY - MEDICATIONS TO TAKE
I will START or STAY ON the medications listed below when I get home from the hospital:    oxyCODONE 5 mg oral tablet  -- 1 tab(s) by mouth every 6 hours, As needed, Severe Pain (7 - 10) MDD:4  -- Indication: For Pain    tamsulosin 0.4 mg oral capsule  -- 1 cap(s) by mouth once a day (at bedtime)  -- Indication: For BPH    Bydureon Pen 2 mg subcutaneous injection, extended release  -- 1 dose(s) subcutaneous every 7 days  -- Indication: For Diabetes    glyBURIDE 1.25 mg oral tablet  -- 0.5 tab(s) by mouth once a day  -- Indication: For Diabetes    Invokana 100 mg oral tablet  -- 1 tab(s) by mouth once a day  -- Indication: For Diabetes    Janumet 50 mg-1000 mg oral tablet  -- 1 tab(s) by mouth 2 times a day  -- Indication: For Diabetes    pantoprazole 40 mg oral delayed release tablet  -- 1 tab(s) by mouth once a day  -- Indication: For gerd I will START or STAY ON the medications listed below when I get home from the hospital:    oxyCODONE 5 mg oral tablet  -- 1 tab(s) by mouth every 6 hours, As needed, Severe Pain (7 - 10) MDD:4  -- Indication: For Pain    tamsulosin 0.4 mg oral capsule  -- 1 cap(s) by mouth once a day (at bedtime)  -- Indication: For BPH    glyBURIDE 1.25 mg oral tablet  -- 0.5 tab(s) by mouth once a day  -- Indication: For Diabetes    Invokana 100 mg oral tablet  -- 1 tab(s) by mouth once a day  -- Indication: For Diabetes    Janumet 50 mg-1000 mg oral tablet  -- 1 tab(s) by mouth 2 times a day  -- Indication: For Diabetes    pantoprazole 40 mg oral delayed release tablet  -- 1 tab(s) by mouth once a day  -- Indication: For gerd

## 2018-02-08 NOTE — DISCHARGE NOTE ADULT - CARE PROVIDER_API CALL
Carlos A Devlin), [facility]; Medical Oncology  [address] [place] [address]  Phone: [phone]  Fax: [phone]    Jairo Richards), Gastroenterology; Internal Medicine  [address] [place] [id number]  Phone: [phone]  Fax: [phone]

## 2018-02-08 NOTE — DISCHARGE NOTE ADULT - PLAN OF CARE
start treatement for malignancy Follow up with Oncologist early next week You had biopsy done  Follow up with GI specialist in 1 week continue oxycodone as needed Hold off aspirin/plavix for now  Follow up with Neurologist HgA1C 6.7  continue home medications  check blood sugar at least 2 x daily  Follow up with Endocrinology HgA1C 6.7  continue home medications except Bydureon   check blood sugar at least 2 x daily  Follow up with Endocrinology Hold lipitor for now  Follow up with PMCHINO

## 2018-02-08 NOTE — PROGRESS NOTE ADULT - SUBJECTIVE AND OBJECTIVE BOX
Patient is a 62y old  Male who presents with a chief complaint of Right flank abdominal pain (06 Feb 2018 14:15)      SUBJECTIVE / OVERNIGHT EVENTS: Patient complaining of diffuse abdominal pain, manageable on pain medications, loss of appetite.  ROS otherwise negative.   No events over night.       T(C): 36.6 (02-08-18 @ 05:02), Max: 36.6 (02-08-18 @ 05:02)  HR: 107 (02-08-18 @ 05:02) (107 - 107)  BP: 108/68 (02-08-18 @ 05:02) (108/68 - 108/68)  RR: 16 (02-08-18 @ 05:02) (16 - 16)  SpO2: 96% (02-08-18 @ 05:02) (96% - 96%)    MEDICATIONS  (STANDING):  dextrose 5%. 1000 milliLiter(s) (50 mL/Hr) IV Continuous <Continuous>  dextrose 50% Injectable 12.5 Gram(s) IV Push once  dextrose 50% Injectable 25 Gram(s) IV Push once  dextrose 50% Injectable 25 Gram(s) IV Push once  heparin  Injectable 5000 Unit(s) SubCutaneous every 8 hours  insulin lispro (HumaLOG) corrective regimen sliding scale   SubCutaneous three times a day before meals  insulin lispro (HumaLOG) corrective regimen sliding scale   SubCutaneous at bedtime  pantoprazole    Tablet 40 milliGRAM(s) Oral before breakfast  tamsulosin 0.4 milliGRAM(s) Oral at bedtime    MEDICATIONS  (PRN):  dextrose Gel 1 Dose(s) Oral once PRN Blood Glucose LESS THAN 70 milliGRAM(s)/deciliter  glucagon  Injectable 1 milliGRAM(s) IntraMuscular once PRN Glucose LESS THAN 70 milligrams/deciliter  ketorolac   Injectable 15 milliGRAM(s) IV Push every 12 hours PRN Moderate Pain (4 - 6)  oxyCODONE    IR 5 milliGRAM(s) Oral every 6 hours PRN Severe Pain (7 - 10)        PHYSICAL EXAM:  GENERAL: NAD, well-developed  HEAD:  Atraumatic, Normocephalic  EYES: EOMI, conjunctiva and sclera clear  NECK: Supple, No JVD  CHEST/LUNG: Clear to auscultation bilaterally; No wheeze  HEART: Regular rate and rhythm; No murmurs, rubs, or gallops  ABDOMEN: Soft, Nontender, Nondistended; Bowel sounds present  EXTREMITIES:  2+ Peripheral Pulses, No clubbing, cyanosis, or edema  PSYCH: AAOx3  NEUROLOGY: non-focal  SKIN: No rashes or lesions                          12.8   6.55  )-----------( 333      ( 08 Feb 2018 07:34 )             39.9           LIVER FUNCTIONS - ( 08 Feb 2018 07:43 )  Alb: 3.0 g/dL / Pro: 6.9 g/dL / ALK PHOS: 225 U/L / ALT: 146 U/L / AST: 171 U/L / GGT: x           PT/INR - ( 06 Feb 2018 12:54 )   PT: 12.3 sec;   INR: 1.13 ratio         PTT - ( 06 Feb 2018 12:54 )  PTT:27.2 sec  138|97|12<102  4.8|25|1.03  9.4,--,--  02-08 @ 07:43    RADIOLOGY & ADDITIONAL TESTS:    Imaging Personally Reviewed:    Consultant(s) Notes Reviewed:  GI, Pulmonary, Onc     Care Discussed with Consultants/Other Providers:

## 2018-02-09 VITALS
RESPIRATION RATE: 18 BRPM | SYSTOLIC BLOOD PRESSURE: 113 MMHG | TEMPERATURE: 98 F | HEART RATE: 111 BPM | DIASTOLIC BLOOD PRESSURE: 64 MMHG | OXYGEN SATURATION: 95 %

## 2018-02-09 DIAGNOSIS — K22.9 DISEASE OF ESOPHAGUS, UNSPECIFIED: ICD-10-CM

## 2018-02-09 PROBLEM — E11.9 TYPE 2 DIABETES MELLITUS WITHOUT COMPLICATIONS: Chronic | Status: ACTIVE | Noted: 2018-02-05

## 2018-02-09 PROBLEM — G45.9 TRANSIENT CEREBRAL ISCHEMIC ATTACK, UNSPECIFIED: Chronic | Status: ACTIVE | Noted: 2018-02-05

## 2018-02-09 LAB
GLUCOSE BLDC GLUCOMTR-MCNC: 109 MG/DL — HIGH (ref 70–99)
SURGICAL PATHOLOGY STUDY: SIGNIFICANT CHANGE UP

## 2018-02-09 PROCEDURE — 82378 CARCINOEMBRYONIC ANTIGEN: CPT

## 2018-02-09 PROCEDURE — 84295 ASSAY OF SERUM SODIUM: CPT

## 2018-02-09 PROCEDURE — 85014 HEMATOCRIT: CPT

## 2018-02-09 PROCEDURE — 83605 ASSAY OF LACTIC ACID: CPT

## 2018-02-09 PROCEDURE — 88360 TUMOR IMMUNOHISTOCHEM/MANUAL: CPT

## 2018-02-09 PROCEDURE — 85730 THROMBOPLASTIN TIME PARTIAL: CPT

## 2018-02-09 PROCEDURE — 86301 IMMUNOASSAY TUMOR CA 19-9: CPT

## 2018-02-09 PROCEDURE — 83690 ASSAY OF LIPASE: CPT

## 2018-02-09 PROCEDURE — 82272 OCCULT BLD FECES 1-3 TESTS: CPT

## 2018-02-09 PROCEDURE — 86304 IMMUNOASSAY TUMOR CA 125: CPT

## 2018-02-09 PROCEDURE — 71046 X-RAY EXAM CHEST 2 VIEWS: CPT

## 2018-02-09 PROCEDURE — 82947 ASSAY GLUCOSE BLOOD QUANT: CPT

## 2018-02-09 PROCEDURE — 82330 ASSAY OF CALCIUM: CPT

## 2018-02-09 PROCEDURE — 82962 GLUCOSE BLOOD TEST: CPT

## 2018-02-09 PROCEDURE — 82435 ASSAY OF BLOOD CHLORIDE: CPT

## 2018-02-09 PROCEDURE — 85027 COMPLETE CBC AUTOMATED: CPT

## 2018-02-09 PROCEDURE — 83615 LACTATE (LD) (LDH) ENZYME: CPT

## 2018-02-09 PROCEDURE — 71260 CT THORAX DX C+: CPT

## 2018-02-09 PROCEDURE — 85610 PROTHROMBIN TIME: CPT

## 2018-02-09 PROCEDURE — 83036 HEMOGLOBIN GLYCOSYLATED A1C: CPT

## 2018-02-09 PROCEDURE — 86803 HEPATITIS C AB TEST: CPT

## 2018-02-09 PROCEDURE — 83735 ASSAY OF MAGNESIUM: CPT

## 2018-02-09 PROCEDURE — 88305 TISSUE EXAM BY PATHOLOGIST: CPT

## 2018-02-09 PROCEDURE — G0103: CPT

## 2018-02-09 PROCEDURE — 87389 HIV-1 AG W/HIV-1&-2 AB AG IA: CPT

## 2018-02-09 PROCEDURE — 99285 EMERGENCY DEPT VISIT HI MDM: CPT | Mod: 25

## 2018-02-09 PROCEDURE — 99232 SBSQ HOSP IP/OBS MODERATE 35: CPT | Mod: GC

## 2018-02-09 PROCEDURE — 84100 ASSAY OF PHOSPHORUS: CPT

## 2018-02-09 PROCEDURE — 82803 BLOOD GASES ANY COMBINATION: CPT

## 2018-02-09 PROCEDURE — 84132 ASSAY OF SERUM POTASSIUM: CPT

## 2018-02-09 PROCEDURE — 74177 CT ABD & PELVIS W/CONTRAST: CPT

## 2018-02-09 PROCEDURE — 80074 ACUTE HEPATITIS PANEL: CPT

## 2018-02-09 PROCEDURE — 80053 COMPREHEN METABOLIC PANEL: CPT

## 2018-02-09 PROCEDURE — 82105 ALPHA-FETOPROTEIN SERUM: CPT

## 2018-02-09 PROCEDURE — 81001 URINALYSIS AUTO W/SCOPE: CPT

## 2018-02-09 PROCEDURE — 99239 HOSP IP/OBS DSCHRG MGMT >30: CPT

## 2018-02-09 RX ORDER — OXYCODONE HYDROCHLORIDE 5 MG/1
1 TABLET ORAL
Qty: 20 | Refills: 0 | OUTPATIENT
Start: 2018-02-09 | End: 2018-02-13

## 2018-02-09 RX ORDER — TAMSULOSIN HYDROCHLORIDE 0.4 MG/1
1 CAPSULE ORAL
Qty: 0 | Refills: 0 | COMMUNITY

## 2018-02-09 RX ORDER — EXENATIDE 250 UG/ML
1 INJECTION SUBCUTANEOUS
Qty: 0 | Refills: 0 | COMMUNITY

## 2018-02-09 RX ORDER — CLOPIDOGREL BISULFATE 75 MG/1
1 TABLET, FILM COATED ORAL
Qty: 0 | Refills: 0 | COMMUNITY

## 2018-02-09 RX ORDER — TAMSULOSIN HYDROCHLORIDE 0.4 MG/1
1 CAPSULE ORAL
Qty: 0 | Refills: 0 | COMMUNITY
Start: 2018-02-09

## 2018-02-09 RX ORDER — ASPIRIN/CALCIUM CARB/MAGNESIUM 324 MG
1 TABLET ORAL
Qty: 0 | Refills: 0 | COMMUNITY

## 2018-02-09 RX ADMIN — PANTOPRAZOLE SODIUM 40 MILLIGRAM(S): 20 TABLET, DELAYED RELEASE ORAL at 06:49

## 2018-02-09 RX ADMIN — Medication 15 MILLIGRAM(S): at 09:42

## 2018-02-09 NOTE — PROGRESS NOTE ADULT - PROBLEM SELECTOR PLAN 4
unclear if patient had TIA   Hold ASA/Plavix for the procedure.
Resume oral hypoglycemics.
unclear if patient had TIA   Hold ASA/Plavix for the procedure.

## 2018-02-09 NOTE — PROGRESS NOTE ADULT - PROBLEM SELECTOR PROBLEM 3
Acute pain
Type 2 diabetes mellitus without complication, unspecified long term insulin use status
Type 2 diabetes mellitus without complication, unspecified long term insulin use status

## 2018-02-09 NOTE — PROGRESS NOTE ADULT - ATTENDING COMMENTS
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

## 2018-02-09 NOTE — PROGRESS NOTE ADULT - PROBLEM SELECTOR PLAN 5
dvt proph - HSQ as patient high risk for DVT in setting of malignancy
Hold ASA/ Plavix for now.
dvt proph - HSQ as patient high risk for DVT in setting of malignancy

## 2018-02-09 NOTE — PROGRESS NOTE ADULT - PROBLEM SELECTOR PLAN 1
findings on ct a/p suggestive of metastatic disease - findings ct a/p and plan of care discussed in detail with patient.   GI, Onc, Pulmonary consulted. Patient with GI symptoms, plan for EGD am tomorrow. Keep patient NPO after MN.   Left message with IR to schedule patient for Liver Biopsy.   Hold off ASA/Plavix for the procedure.   Follow up with IR.
s/p EGD and biopsy positive for poorly differentiated Adenocarcinoma.  Spoke to Onc patient does not need Liver Biopsy, treatment options to be discussed as out patient.   GI follow up appreciated.
findings on ct a/p suggestive of metastatic disease - findings ct a/p and plan of care discussed in detail with patient.   GI, Onc, Pulmonary consulted. Patient with GI symptoms, plan for EGD today.   IR to schedule patient for Liver Biopsy Monday.   Hold off ASA/Plavix for the procedure.

## 2018-02-09 NOTE — PROGRESS NOTE ADULT - PROVIDER SPECIALTY LIST ADULT
Anesthesia
Gastroenterology
Hospitalist
Hospitalist
Pulmonology
Hospitalist

## 2018-02-09 NOTE — PROGRESS NOTE ADULT - SUBJECTIVE AND OBJECTIVE BOX
Patient is a 62y old  Male who presents with a chief complaint of Right flank abdominal pain (06 Feb 2018 14:15)      SUBJECTIVE / OVERNIGHT EVENTS: Patient complaining of diffuse abdominal pain, manageable on pain medications, loss of appetite.                        12.8   6.55  )-----------( 333      ( 08 Feb 2018 07:34 )             39.9           LIVER FUNCTIONS - ( 08 Feb 2018 07:43 )  Alb: 3.0 g/dL / Pro: 6.9 g/dL / ALK PHOS: 225 U/L / ALT: 146 U/L / AST: 171 U/L / GGT: x             ROS otherwise negative.   No events over night.   MEDICATIONS  (STANDING):  dextrose 5%. 1000 milliLiter(s) (50 mL/Hr) IV Continuous <Continuous>  dextrose 50% Injectable 12.5 Gram(s) IV Push once  dextrose 50% Injectable 25 Gram(s) IV Push once  dextrose 50% Injectable 25 Gram(s) IV Push once  heparin  Injectable 5000 Unit(s) SubCutaneous every 8 hours  insulin lispro (HumaLOG) corrective regimen sliding scale   SubCutaneous three times a day before meals  insulin lispro (HumaLOG) corrective regimen sliding scale   SubCutaneous at bedtime  pantoprazole    Tablet 40 milliGRAM(s) Oral before breakfast  tamsulosin 0.4 milliGRAM(s) Oral at bedtime    MEDICATIONS  (PRN):  dextrose Gel 1 Dose(s) Oral once PRN Blood Glucose LESS THAN 70 milliGRAM(s)/deciliter  glucagon  Injectable 1 milliGRAM(s) IntraMuscular once PRN Glucose LESS THAN 70 milligrams/deciliter  ketorolac   Injectable 15 milliGRAM(s) IV Push every 12 hours PRN Moderate Pain (4 - 6)  oxyCODONE    IR 5 milliGRAM(s) Oral every 6 hours PRN Severe Pain (7 - 10)      PHYSICAL EXAM:  GENERAL: NAD, well-developed  HEAD:  Atraumatic, Normocephalic  EYES: EOMI, conjunctiva and sclera clear  NECK: Supple, No JVD  CHEST/LUNG: Clear to auscultation bilaterally; No wheeze  HEART: Regular rate and rhythm; No murmurs, rubs, or gallops  ABDOMEN: Soft, Nontender, Nondistended; Bowel sounds present  EXTREMITIES:  2+ Peripheral Pulses, No clubbing, cyanosis, or edema  PSYCH: AAOx3  NEUROLOGY: non-focal  SKIN: No rashes or lesions                              12.8   6.55  )-----------( 333      ( 08 Feb 2018 07:34 )             39.9           LIVER FUNCTIONS - ( 08 Feb 2018 07:43 )  Alb: 3.0 g/dL / Pro: 6.9 g/dL / ALK PHOS: 225 U/L / ALT: 146 U/L / AST: 171 U/L / GGT: x

## 2018-02-09 NOTE — PROGRESS NOTE ADULT - PROBLEM SELECTOR PLAN 3
monitor FS  ISS for coverage for now   hold all home medications
Resolving, d/c patient on Oxycodone.
monitor FS  ISS for coverage for now   hold all home medications

## 2018-02-09 NOTE — PROGRESS NOTE ADULT - SUBJECTIVE AND OBJECTIVE BOX
Chief Complaint:  Patient is a 62y old  Male who presents with a chief complaint of Right flank abdominal pain (08 Feb 2018 18:23)      Interval Events:     Allergies:  No Known Allergies      Hospital Medications:  dextrose 5%. 1000 milliLiter(s) IV Continuous <Continuous>  dextrose 50% Injectable 12.5 Gram(s) IV Push once  dextrose 50% Injectable 25 Gram(s) IV Push once  dextrose 50% Injectable 25 Gram(s) IV Push once  dextrose Gel 1 Dose(s) Oral once PRN  glucagon  Injectable 1 milliGRAM(s) IntraMuscular once PRN  heparin  Injectable 5000 Unit(s) SubCutaneous every 8 hours  insulin lispro (HumaLOG) corrective regimen sliding scale   SubCutaneous three times a day before meals  insulin lispro (HumaLOG) corrective regimen sliding scale   SubCutaneous at bedtime  ketorolac   Injectable 15 milliGRAM(s) IV Push every 12 hours PRN  oxyCODONE    IR 5 milliGRAM(s) Oral every 6 hours PRN  pantoprazole    Tablet 40 milliGRAM(s) Oral before breakfast  tamsulosin 0.4 milliGRAM(s) Oral at bedtime      PMHX/PSHX:  TIA (transient ischemic attack)  Diabetes  H/O prior ablation treatment  Status post placement of implantable loop recorder      Family history:  Family history of chronic ischemic heart disease  Family history of hypertension (Father)      ROS:     General:  No wt loss, fevers, chills, night sweats, fatigue,   Eyes:  Good vision, no reported pain  ENT:  No sore throat, pain, runny nose, dysphagia  CV:  No pain, palpitations, hypo/hypertension  Resp:  No dyspnea, cough, tachypnea, wheezing  GI:  See HPI  :  No pain, bleeding, incontinence, nocturia  Muscle:  No pain, weakness  Neuro:  No weakness, tingling, memory problems  Psych:  No fatigue, insomnia, mood problems, depression  Endocrine:  No polyuria, polydipsia, cold/heat intolerance  Heme:  No petechiae, ecchymosis, easy bruisability  Skin:  No rash, edema      PHYSICAL EXAM:     GENERAL:  Appears stated age, well-groomed, well-nourished, no distress  HEENT:  NC/AT,  conjunctivae clear, sclera -anicteric  CHEST:  Full & symmetric excursion, no increased effort, breath sounds clear  HEART:  Regular rhythm, S1, S2, no murmur/rub/S3/S4,  no edema  ABDOMEN:  Soft, non-tender, non-distended, normoactive bowel sounds,  no masses ,no hepato-splenomegaly,   EXTREMITIES:  no cyanosis,clubbing or edema  SKIN:  No rash/erythema/ecchymoses/petechiae/wounds/abscess/warm/dry  NEURO:  Alert, oriented    Vital Signs:  Vital Signs Last 24 Hrs  T(C): 36.9 (09 Feb 2018 05:03), Max: 36.9 (09 Feb 2018 05:03)  T(F): 98.5 (09 Feb 2018 05:03), Max: 98.5 (09 Feb 2018 05:03)  HR: 111 (09 Feb 2018 05:03) (97 - 111)  BP: 113/64 (09 Feb 2018 05:03) (113/64 - 132/74)  BP(mean): --  RR: 18 (09 Feb 2018 05:03) (18 - 18)  SpO2: 95% (09 Feb 2018 05:03) (95% - 95%)  Daily     Daily     LABS:                        12.8   6.55  )-----------( 333      ( 08 Feb 2018 07:34 )             39.9     02-08    138  |  97  |  12  ----------------------------<  102<H>  4.8   |  25  |  1.03    Ca    9.4      08 Feb 2018 07:43    TPro  6.9  /  Alb  3.0<L>  /  TBili  0.3  /  DBili  x   /  AST  171<H>  /  ALT  146<H>  /  AlkPhos  225<H>  02-08    LIVER FUNCTIONS - ( 08 Feb 2018 07:43 )  Alb: 3.0 g/dL / Pro: 6.9 g/dL / ALK PHOS: 225 U/L / ALT: 146 U/L / AST: 171 U/L / GGT: x                   Imaging:    < from: Upper Endoscopy (02.08.18 @ 11:38) >  Findings:       A large, ulcerating mass with contact bleeding was found in the lower third of the esophagus.        Themass was partially obstructing but non circumferential. Biopsies were taken with a cold        forceps for histology.       The entire examined stomach was normal.       The duodenal bulb and 2nd part of the duodenum were normal.                                                                                    Impression:          - Partially obstructing esophageal mass was found in the lower third of the                        esophagus. Suspicious for malignancy Biopsied.                      - Normal stomach.                       - Normal duodenal bulb and 2nd part of the duodenum.  Recommendation:      - [facility].                       - Full liquid diet.  - Await pathology results.                       - Recommend oncology and surgery consult.    < end of copied text >      < from: CT Abdomen and Pelvis w/ Oral Cont and w/ IV Cont (02.06.18 @ 04:38) >  IMPRESSION:   Bilateral pulmonary metastases.    Extensive bilateral hilar and mediastinal adenopathy, presumably   metastatic adenopathy.    Diffuse hepatic metastases.    Enlarged gastrohepatic lymph nodes, likely metastatic.    Indeterminate 1 cm left adrenal nodule may be metastatic.    Indeterminate age nondisplaced fracture of the left-sided L2 transverse   process.    < end of copied text > Chief Complaint:  Patient is a 62y old  Male who presents with a chief complaint of Right flank abdominal pain (08 Feb 2018 18:23)      Interval Events:   Patient seen and examined.  He had an upper endoscopy yest and tolerated the procedure.     Allergies:  No Known Allergies      Hospital Medications:  dextrose 5%. 1000 milliLiter(s) IV Continuous <Continuous>  dextrose 50% Injectable 12.5 Gram(s) IV Push once  dextrose 50% Injectable 25 Gram(s) IV Push once  dextrose 50% Injectable 25 Gram(s) IV Push once  dextrose Gel 1 Dose(s) Oral once PRN  glucagon  Injectable 1 milliGRAM(s) IntraMuscular once PRN  heparin  Injectable 5000 Unit(s) SubCutaneous every 8 hours  insulin lispro (HumaLOG) corrective regimen sliding scale   SubCutaneous three times a day before meals  insulin lispro (HumaLOG) corrective regimen sliding scale   SubCutaneous at bedtime  ketorolac   Injectable 15 milliGRAM(s) IV Push every 12 hours PRN  oxyCODONE    IR 5 milliGRAM(s) Oral every 6 hours PRN  pantoprazole    Tablet 40 milliGRAM(s) Oral before breakfast  tamsulosin 0.4 milliGRAM(s) Oral at bedtime      PMHX/PSHX:  TIA (transient ischemic attack)  Diabetes  H/O prior ablation treatment  Status post placement of implantable loop recorder      Family history:  Family history of chronic ischemic heart disease  Family history of hypertension (Father)      ROS:     General:  No wt loss, fevers, chills, night sweats, fatigue,   Eyes:  Good vision, no reported pain  ENT:  No sore throat, pain, runny nose, dysphagia  CV:  No pain, palpitations, hypo/hypertension  Resp:  No dyspnea, cough, tachypnea, wheezing  GI:  See HPI  :  No pain, bleeding, incontinence, nocturia  Muscle:  No pain, weakness  Neuro:  No weakness, tingling, memory problems  Psych:  No fatigue, insomnia, mood problems, depression  Endocrine:  No polyuria, polydipsia, cold/heat intolerance  Heme:  No petechiae, ecchymosis, easy bruisability  Skin:  No rash, edema      PHYSICAL EXAM:     GENERAL:  Appears stated age, well-groomed, well-nourished, no distress  HEENT:  NC/AT,  conjunctivae clear, sclera -anicteric  CHEST:  Full & symmetric excursion, no increased effort, breath sounds clear  HEART:  Regular rhythm, S1, S2, no murmur/rub/S3/S4,  no edema  ABDOMEN:  Soft, non-tender, non-distended, normoactive bowel sounds,  no masses ,no hepato-splenomegaly,   EXTREMITIES:  no cyanosis,clubbing or edema  SKIN:  No rash/erythema/ecchymoses/petechiae/wounds/abscess/warm/dry  NEURO:  Alert, oriented    Vital Signs:  Vital Signs Last 24 Hrs  T(C): 36.9 (09 Feb 2018 05:03), Max: 36.9 (09 Feb 2018 05:03)  T(F): 98.5 (09 Feb 2018 05:03), Max: 98.5 (09 Feb 2018 05:03)  HR: 111 (09 Feb 2018 05:03) (97 - 111)  BP: 113/64 (09 Feb 2018 05:03) (113/64 - 132/74)  BP(mean): --  RR: 18 (09 Feb 2018 05:03) (18 - 18)  SpO2: 95% (09 Feb 2018 05:03) (95% - 95%)  Daily     Daily     LABS:                        12.8   6.55  )-----------( 333      ( 08 Feb 2018 07:34 )             39.9     02-08    138  |  97  |  12  ----------------------------<  102<H>  4.8   |  25  |  1.03    Ca    9.4      08 Feb 2018 07:43    TPro  6.9  /  Alb  3.0<L>  /  TBili  0.3  /  DBili  x   /  AST  171<H>  /  ALT  146<H>  /  AlkPhos  225<H>  02-08    LIVER FUNCTIONS - ( 08 Feb 2018 07:43 )  Alb: 3.0 g/dL / Pro: 6.9 g/dL / ALK PHOS: 225 U/L / ALT: 146 U/L / AST: 171 U/L / GGT: x                   Imaging:    < from: Upper Endoscopy (02.08.18 @ 11:38) >  Findings:       A large, ulcerating mass with contact bleeding was found in the lower third of the esophagus.        Themass was partially obstructing but non circumferential. Biopsies were taken with a cold        forceps for histology.       The entire examined stomach was normal.       The duodenal bulb and 2nd part of the duodenum were normal.                                                                                    Impression:          - Partially obstructing esophageal mass was found in the lower third of the                        esophagus. Suspicious for malignancy Biopsied.                      - Normal stomach.                       - Normal duodenal bulb and 2nd part of the duodenum.  Recommendation:      - [facility] patient to [facility] for [facility] care.                       - Full liquid diet.  - Await pathology results.                       - Recommend oncology and surgery consult.    < end of copied text >      < from: CT Abdomen and Pelvis w/ Oral Cont and w/ IV Cont (02.06.18 @ 04:38) >  IMPRESSION:   Bilateral pulmonary metastases.    Extensive bilateral hilar and mediastinal adenopathy, presumably   metastatic adenopathy.    Diffuse hepatic metastases.    Enlarged gastrohepatic lymph nodes, likely metastatic.    Indeterminate 1 cm left adrenal nodule may be metastatic.    Indeterminate age nondisplaced fracture of the left-sided L2 transverse   process.    < end of copied text >

## 2018-02-09 NOTE — PROGRESS NOTE ADULT - ASSESSMENT
62 year old male with DMII, BPH, WPW syndrome s/p ablation, suspected TIA in 1/2018, p/w 3-4 weeks of worsening epigastric/RUQ pain radiating to the back, along with early satiety, dysphagia to solids and liquids, poor PO intake and ~10 pound weight loss.     Impression:  - Dysphagia s/p EGD 2/8/2018 with partially obstructing esophageal mass was found in the lower third of the esophagus. Suspicious for malignancy Biopsied.    Plan:  - pending path results  - surgery and onc recommendations appreciated  - diet as tolerated, preferably nothing hard   - supportive care as per primary team 62 year old male with DMII, BPH, WPW syndrome s/p ablation, suspected TIA in 1/2018, p/w 3-4 weeks of worsening epigastric/RUQ pain radiating to the back, along with early satiety, dysphagia to solids and liquids, poor PO intake and ~10 pound weight loss.     Impression:  - Dysphagia s/p EGD 2/8/2018 with partially obstructing esophageal mass was found in the lower third of the esophagus. Suspicious for malignancy Biopsy with poorly differentiated adenoca.     Plan:  - surgery and onc recommendations appreciated  - no plan for liver biopsy, plan discussed with primary team, patient and wife  - diet as tolerated, preferably nothing hard   - supportive care as per primary team    GI team will signoff.  Thank you for the consult.   Please call back with questions.

## 2018-02-09 NOTE — PROGRESS NOTE ADULT - PROBLEM SELECTOR PROBLEM 4
Transient cerebral ischemia, unspecified type
Transient cerebral ischemia, unspecified type
Type 2 diabetes mellitus without complication, unspecified long term insulin use status

## 2018-02-12 ENCOUNTER — OUTPATIENT (OUTPATIENT)
Dept: OUTPATIENT SERVICES | Facility: HOSPITAL | Age: 63
LOS: 1 days | Discharge: HOME | End: 2018-02-12

## 2018-02-12 DIAGNOSIS — C15.3 MALIGNANT NEOPLASM OF UPPER THIRD OF ESOPHAGUS: ICD-10-CM

## 2018-02-12 DIAGNOSIS — Z95.818 PRESENCE OF OTHER CARDIAC IMPLANTS AND GRAFTS: Chronic | ICD-10-CM

## 2018-02-12 DIAGNOSIS — Z98.890 OTHER SPECIFIED POSTPROCEDURAL STATES: Chronic | ICD-10-CM

## 2018-02-13 ENCOUNTER — APPOINTMENT (OUTPATIENT)
Dept: HEMATOLOGY ONCOLOGY | Facility: CLINIC | Age: 63
End: 2018-02-13
Payer: COMMERCIAL

## 2018-02-13 ENCOUNTER — RESULT REVIEW (OUTPATIENT)
Age: 63
End: 2018-02-13

## 2018-02-13 VITALS
SYSTOLIC BLOOD PRESSURE: 121 MMHG | WEIGHT: 126.54 LBS | DIASTOLIC BLOOD PRESSURE: 81 MMHG | BODY MASS INDEX: 23.29 KG/M2 | HEIGHT: 61.81 IN | RESPIRATION RATE: 16 BRPM

## 2018-02-13 DIAGNOSIS — E11.9 TYPE 2 DIABETES MELLITUS W/OUT COMPLICATIONS: ICD-10-CM

## 2018-02-13 DIAGNOSIS — C15.5 MALIGNANT NEOPLASM OF LOWER THIRD OF ESOPHAGUS: ICD-10-CM

## 2018-02-13 DIAGNOSIS — Z79.899 OTHER LONG TERM (CURRENT) DRUG THERAPY: ICD-10-CM

## 2018-02-13 DIAGNOSIS — Z98.890 OTHER SPECIFIED POSTPROCEDURAL STATES: ICD-10-CM

## 2018-02-13 DIAGNOSIS — R94.31 ABNORMAL ELECTROCARDIOGRAM [ECG] [EKG]: ICD-10-CM

## 2018-02-13 DIAGNOSIS — N40.1 BENIGN PROSTATIC HYPERPLASIA WITH LOWER URINARY TRACT SYMPMS: ICD-10-CM

## 2018-02-13 DIAGNOSIS — C15.9 MALIGNANT NEOPLASM OF ESOPHAGUS, UNSPECIFIED: ICD-10-CM

## 2018-02-13 DIAGNOSIS — N13.8 BENIGN PROSTATIC HYPERPLASIA WITH LOWER URINARY TRACT SYMPMS: ICD-10-CM

## 2018-02-13 DIAGNOSIS — R13.10 DYSPHAGIA, UNSPECIFIED: ICD-10-CM

## 2018-02-13 DIAGNOSIS — R05 COUGH: ICD-10-CM

## 2018-02-13 LAB
HCT VFR BLD CALC: 42.3 % — SIGNIFICANT CHANGE UP (ref 39–50)
HGB BLD-MCNC: 14.1 G/DL — SIGNIFICANT CHANGE UP (ref 13–17)
MCHC RBC-ENTMCNC: 27.7 PG — SIGNIFICANT CHANGE UP (ref 27–34)
MCHC RBC-ENTMCNC: 33.4 G/DL — SIGNIFICANT CHANGE UP (ref 32–36)
MCV RBC AUTO: 83.1 FL — SIGNIFICANT CHANGE UP (ref 80–100)
PLATELET # BLD AUTO: 435 K/UL — HIGH (ref 150–400)
RBC # BLD: 5.1 M/UL — SIGNIFICANT CHANGE UP (ref 4.2–5.8)
RBC # FLD: 13.4 % — SIGNIFICANT CHANGE UP (ref 10.3–14.5)
WBC # BLD: 10.9 K/UL — HIGH (ref 3.8–10.5)
WBC # FLD AUTO: 10.9 K/UL — HIGH (ref 3.8–10.5)

## 2018-02-13 PROCEDURE — 99205 OFFICE O/P NEW HI 60 MIN: CPT

## 2018-02-13 RX ORDER — OXYCODONE 5 MG/1
5 TABLET ORAL
Qty: 20 | Refills: 0 | Status: ACTIVE | COMMUNITY
Start: 2018-02-09

## 2018-02-13 RX ORDER — BLOOD SUGAR DIAGNOSTIC
STRIP MISCELLANEOUS
Qty: 200 | Refills: 0 | Status: ACTIVE | COMMUNITY
Start: 2017-08-30

## 2018-02-13 RX ORDER — PANTOPRAZOLE 40 MG/1
40 TABLET, DELAYED RELEASE ORAL
Qty: 30 | Refills: 0 | Status: ACTIVE | COMMUNITY
Start: 2018-02-01

## 2018-02-13 RX ORDER — 70%ISOPROPYL ALCOHOL 0.7 ML/ML
70 SWAB TOPICAL
Qty: 200 | Refills: 0 | Status: ACTIVE | COMMUNITY
Start: 2017-08-30

## 2018-02-13 RX ORDER — EXENATIDE 2 MG/.65ML
2 INJECTION, SUSPENSION, EXTENDED RELEASE SUBCUTANEOUS
Qty: 8 | Refills: 0 | Status: DISCONTINUED | COMMUNITY
Start: 2017-08-30

## 2018-02-13 RX ORDER — HYDROCODONE BITARTRATE AND HOMATROPINE METHYLBROMIDE 5; 1.5 MG/5ML; MG/5ML
5-1.5 SYRUP ORAL
Qty: 300 | Refills: 0 | Status: ACTIVE | COMMUNITY
Start: 2018-02-13 | End: 1900-01-01

## 2018-02-13 RX ORDER — ERGOCALCIFEROL 1.25 MG/1
1.25 MG CAPSULE, LIQUID FILLED ORAL
Qty: 3 | Refills: 0 | Status: ACTIVE | COMMUNITY
Start: 2017-08-30

## 2018-02-13 RX ORDER — LANCETS 33 GAUGE
EACH MISCELLANEOUS
Qty: 200 | Refills: 0 | Status: ACTIVE | COMMUNITY
Start: 2017-08-30

## 2018-02-13 RX ORDER — CLOPIDOGREL BISULFATE 75 MG/1
75 TABLET, FILM COATED ORAL
Qty: 30 | Refills: 0 | Status: ACTIVE | COMMUNITY
Start: 2018-01-25

## 2018-02-13 RX ORDER — CYANOCOBALAMIN 1000 UG/ML
1000 INJECTION INTRAMUSCULAR; SUBCUTANEOUS
Qty: 3 | Refills: 0 | Status: ACTIVE | COMMUNITY
Start: 2017-09-03

## 2018-02-14 ENCOUNTER — MESSAGE (OUTPATIENT)
Age: 63
End: 2018-02-14

## 2018-02-14 DIAGNOSIS — C79.31 SECONDARY MALIGNANT NEOPLASM OF BRAIN: ICD-10-CM

## 2018-02-14 PROBLEM — Z79.899 ON ANTINEOPLASTIC CHEMOTHERAPY: Status: ACTIVE | Noted: 2018-02-14

## 2018-02-14 RX ORDER — DEXAMETHASONE 4 MG/1
4 TABLET ORAL TWICE DAILY
Qty: 40 | Refills: 1 | Status: ACTIVE | COMMUNITY
Start: 2018-02-14 | End: 1900-01-01

## 2018-02-15 ENCOUNTER — OUTPATIENT (OUTPATIENT)
Dept: OUTPATIENT SERVICES | Facility: HOSPITAL | Age: 63
LOS: 1 days | Discharge: HOME | End: 2018-02-15
Payer: COMMERCIAL

## 2018-02-15 DIAGNOSIS — Z95.818 PRESENCE OF OTHER CARDIAC IMPLANTS AND GRAFTS: Chronic | ICD-10-CM

## 2018-02-15 DIAGNOSIS — Z98.890 OTHER SPECIFIED POSTPROCEDURAL STATES: Chronic | ICD-10-CM

## 2018-02-16 ENCOUNTER — APPOINTMENT (OUTPATIENT)
Dept: RADIATION ONCOLOGY | Facility: CLINIC | Age: 63
End: 2018-02-16
Payer: COMMERCIAL

## 2018-02-16 VITALS
WEIGHT: 124.78 LBS | HEART RATE: 97 BPM | DIASTOLIC BLOOD PRESSURE: 80 MMHG | TEMPERATURE: 97.8 F | OXYGEN SATURATION: 96 % | RESPIRATION RATE: 16 BRPM | BODY MASS INDEX: 23.56 KG/M2 | SYSTOLIC BLOOD PRESSURE: 109 MMHG | HEIGHT: 61 IN

## 2018-02-16 PROCEDURE — 99203 OFFICE O/P NEW LOW 30 MIN: CPT | Mod: 25

## 2018-02-16 PROCEDURE — 77262 THER RADIOLOGY TX PLNG INTRM: CPT

## 2018-02-19 ENCOUNTER — INPATIENT (INPATIENT)
Facility: HOSPITAL | Age: 63
LOS: 10 days | DRG: 374 | End: 2018-03-02
Attending: INTERNAL MEDICINE | Admitting: HOSPITALIST
Payer: COMMERCIAL

## 2018-02-19 VITALS
WEIGHT: 117.95 LBS | HEIGHT: 61 IN | DIASTOLIC BLOOD PRESSURE: 80 MMHG | OXYGEN SATURATION: 96 % | HEART RATE: 107 BPM | TEMPERATURE: 98 F | RESPIRATION RATE: 20 BRPM | SYSTOLIC BLOOD PRESSURE: 125 MMHG

## 2018-02-19 DIAGNOSIS — Z95.818 PRESENCE OF OTHER CARDIAC IMPLANTS AND GRAFTS: Chronic | ICD-10-CM

## 2018-02-19 DIAGNOSIS — Z98.890 OTHER SPECIFIED POSTPROCEDURAL STATES: Chronic | ICD-10-CM

## 2018-02-19 LAB
ALBUMIN SERPL ELPH-MCNC: 3.8 G/DL — SIGNIFICANT CHANGE UP (ref 3.3–5)
ALP SERPL-CCNC: 343 U/L — HIGH (ref 40–120)
ALT FLD-CCNC: 170 U/L RC — HIGH (ref 10–45)
ANION GAP SERPL CALC-SCNC: 32 MMOL/L — HIGH (ref 5–17)
ANISOCYTOSIS BLD QL: SLIGHT — SIGNIFICANT CHANGE UP
APPEARANCE UR: CLEAR — SIGNIFICANT CHANGE UP
APTT BLD: 24.9 SEC — LOW (ref 27.5–37.4)
AST SERPL-CCNC: 161 U/L — HIGH (ref 10–40)
BASE EXCESS BLDV CALC-SCNC: -3.7 MMOL/L — LOW (ref -2–2)
BASE EXCESS BLDV CALC-SCNC: -7 MMOL/L — LOW (ref -2–2)
BASE EXCESS BLDV CALC-SCNC: -8.6 MMOL/L — LOW (ref -2–2)
BASOPHILS # BLD AUTO: 0.1 K/UL — SIGNIFICANT CHANGE UP (ref 0–0.2)
BILIRUB SERPL-MCNC: 1.2 MG/DL — SIGNIFICANT CHANGE UP (ref 0.2–1.2)
BILIRUB UR-MCNC: NEGATIVE — SIGNIFICANT CHANGE UP
BLD GP AB SCN SERPL QL: NEGATIVE — SIGNIFICANT CHANGE UP
BUN SERPL-MCNC: 46 MG/DL — HIGH (ref 7–23)
CA-I SERPL-SCNC: 1.2 MMOL/L — SIGNIFICANT CHANGE UP (ref 1.12–1.3)
CA-I SERPL-SCNC: 1.29 MMOL/L — SIGNIFICANT CHANGE UP (ref 1.12–1.3)
CA-I SERPL-SCNC: 1.31 MMOL/L — HIGH (ref 1.12–1.3)
CALCIUM SERPL-MCNC: 10.7 MG/DL — HIGH (ref 8.4–10.5)
CHLORIDE BLDV-SCNC: 100 MMOL/L — SIGNIFICANT CHANGE UP (ref 96–108)
CHLORIDE BLDV-SCNC: 104 MMOL/L — SIGNIFICANT CHANGE UP (ref 96–108)
CHLORIDE BLDV-SCNC: 107 MMOL/L — SIGNIFICANT CHANGE UP (ref 96–108)
CHLORIDE SERPL-SCNC: 93 MMOL/L — LOW (ref 96–108)
CO2 BLDV-SCNC: 17 MMOL/L — LOW (ref 22–30)
CO2 BLDV-SCNC: 18 MMOL/L — LOW (ref 22–30)
CO2 BLDV-SCNC: 22 MMOL/L — SIGNIFICANT CHANGE UP (ref 22–30)
CO2 SERPL-SCNC: 14 MMOL/L — LOW (ref 22–31)
COLOR SPEC: YELLOW — SIGNIFICANT CHANGE UP
CREAT SERPL-MCNC: 0.95 MG/DL — SIGNIFICANT CHANGE UP (ref 0.5–1.3)
DIFF PNL FLD: NEGATIVE — SIGNIFICANT CHANGE UP
EOSINOPHIL # BLD AUTO: 0 K/UL — SIGNIFICANT CHANGE UP (ref 0–0.5)
GAS PNL BLDV: 136 MMOL/L — SIGNIFICANT CHANGE UP (ref 136–145)
GAS PNL BLDV: 139 MMOL/L — SIGNIFICANT CHANGE UP (ref 136–145)
GAS PNL BLDV: 139 MMOL/L — SIGNIFICANT CHANGE UP (ref 136–145)
GAS PNL BLDV: SIGNIFICANT CHANGE UP
GLUCOSE BLDV-MCNC: 107 MG/DL — HIGH (ref 70–99)
GLUCOSE BLDV-MCNC: 157 MG/DL — HIGH (ref 70–99)
GLUCOSE BLDV-MCNC: 217 MG/DL — HIGH (ref 70–99)
GLUCOSE SERPL-MCNC: 200 MG/DL — HIGH (ref 70–99)
GLUCOSE UR QL: >1000 MG/DL
HCO3 BLDV-SCNC: 16 MMOL/L — LOW (ref 21–29)
HCO3 BLDV-SCNC: 17 MMOL/L — LOW (ref 21–29)
HCO3 BLDV-SCNC: 21 MMOL/L — SIGNIFICANT CHANGE UP (ref 21–29)
HCT VFR BLD CALC: 48.1 % — SIGNIFICANT CHANGE UP (ref 39–50)
HCT VFR BLDA CALC: 41 % — SIGNIFICANT CHANGE UP (ref 39–50)
HCT VFR BLDA CALC: 43 % — SIGNIFICANT CHANGE UP (ref 39–50)
HCT VFR BLDA CALC: 48 % — SIGNIFICANT CHANGE UP (ref 39–50)
HGB BLD CALC-MCNC: 13.3 G/DL — SIGNIFICANT CHANGE UP (ref 13–17)
HGB BLD CALC-MCNC: 13.9 G/DL — SIGNIFICANT CHANGE UP (ref 13–17)
HGB BLD CALC-MCNC: 15.8 G/DL — SIGNIFICANT CHANGE UP (ref 13–17)
HGB BLD-MCNC: 15.8 G/DL — SIGNIFICANT CHANGE UP (ref 13–17)
HYPOCHROMIA BLD QL: SLIGHT — SIGNIFICANT CHANGE UP
INR BLD: 1.19 RATIO — HIGH (ref 0.88–1.16)
KETONES UR-MCNC: ABNORMAL
LACTATE BLDV-MCNC: 10.9 MMOL/L — CRITICAL HIGH (ref 0.7–2)
LACTATE BLDV-MCNC: 4.8 MMOL/L — CRITICAL HIGH (ref 0.7–2)
LACTATE BLDV-MCNC: 8.5 MMOL/L — CRITICAL HIGH (ref 0.7–2)
LEUKOCYTE ESTERASE UR-ACNC: NEGATIVE — SIGNIFICANT CHANGE UP
LYMPHOCYTES # BLD AUTO: 1.5 K/UL — SIGNIFICANT CHANGE UP (ref 1–3.3)
LYMPHOCYTES # BLD AUTO: 3 % — LOW (ref 13–44)
MCHC RBC-ENTMCNC: 27.8 PG — SIGNIFICANT CHANGE UP (ref 27–34)
MCHC RBC-ENTMCNC: 33 GM/DL — SIGNIFICANT CHANGE UP (ref 32–36)
MCV RBC AUTO: 84.3 FL — SIGNIFICANT CHANGE UP (ref 80–100)
MONOCYTES # BLD AUTO: 1.2 K/UL — HIGH (ref 0–0.9)
MONOCYTES NFR BLD AUTO: 12 % — SIGNIFICANT CHANGE UP (ref 2–14)
NEUTROPHILS # BLD AUTO: 13.3 K/UL — HIGH (ref 1.8–7.4)
NEUTROPHILS NFR BLD AUTO: 80 % — HIGH (ref 43–77)
NEUTS BAND # BLD: 2 % — SIGNIFICANT CHANGE UP (ref 0–8)
NITRITE UR-MCNC: NEGATIVE — SIGNIFICANT CHANGE UP
PCO2 BLDV: 32 MMHG — LOW (ref 35–50)
PCO2 BLDV: 33 MMHG — LOW (ref 35–50)
PCO2 BLDV: 39 MMHG — SIGNIFICANT CHANGE UP (ref 35–50)
PH BLDV: 7.32 — LOW (ref 7.35–7.45)
PH BLDV: 7.34 — LOW (ref 7.35–7.45)
PH BLDV: 7.35 — SIGNIFICANT CHANGE UP (ref 7.35–7.45)
PH UR: 5.5 — SIGNIFICANT CHANGE UP (ref 5–8)
PLAT MORPH BLD: NORMAL — SIGNIFICANT CHANGE UP
PLATELET # BLD AUTO: 607 K/UL — HIGH (ref 150–400)
PO2 BLDV: 62 MMHG — HIGH (ref 25–45)
PO2 BLDV: 71 MMHG — HIGH (ref 25–45)
PO2 BLDV: 71 MMHG — HIGH (ref 25–45)
POIKILOCYTOSIS BLD QL AUTO: SLIGHT — SIGNIFICANT CHANGE UP
POLYCHROMASIA BLD QL SMEAR: SLIGHT — SIGNIFICANT CHANGE UP
POTASSIUM BLDV-SCNC: 4.5 MMOL/L — SIGNIFICANT CHANGE UP (ref 3.5–5)
POTASSIUM BLDV-SCNC: 4.8 MMOL/L — SIGNIFICANT CHANGE UP (ref 3.5–5)
POTASSIUM BLDV-SCNC: 7.5 MMOL/L — CRITICAL HIGH (ref 3.5–5)
POTASSIUM SERPL-MCNC: 5.3 MMOL/L — SIGNIFICANT CHANGE UP (ref 3.5–5.3)
POTASSIUM SERPL-SCNC: 5.3 MMOL/L — SIGNIFICANT CHANGE UP (ref 3.5–5.3)
PROT SERPL-MCNC: 7.8 G/DL — SIGNIFICANT CHANGE UP (ref 6–8.3)
PROT UR-MCNC: SIGNIFICANT CHANGE UP
PROTHROM AB SERPL-ACNC: 12.9 SEC — HIGH (ref 9.8–12.7)
RBC # BLD: 5.7 M/UL — SIGNIFICANT CHANGE UP (ref 4.2–5.8)
RBC # FLD: 14.2 % — SIGNIFICANT CHANGE UP (ref 10.3–14.5)
RBC BLD AUTO: ABNORMAL
RH IG SCN BLD-IMP: POSITIVE — SIGNIFICANT CHANGE UP
RH IG SCN BLD-IMP: POSITIVE — SIGNIFICANT CHANGE UP
SAO2 % BLDV: 86 % — SIGNIFICANT CHANGE UP (ref 67–88)
SAO2 % BLDV: 91 % — HIGH (ref 67–88)
SAO2 % BLDV: 91 % — HIGH (ref 67–88)
SODIUM SERPL-SCNC: 139 MMOL/L — SIGNIFICANT CHANGE UP (ref 135–145)
SP GR SPEC: 1.03 — HIGH (ref 1.01–1.02)
TARGETS BLD QL SMEAR: SLIGHT — SIGNIFICANT CHANGE UP
UROBILINOGEN FLD QL: NEGATIVE — SIGNIFICANT CHANGE UP
VARIANT LYMPHS # BLD: 3 % — SIGNIFICANT CHANGE UP (ref 0–6)
WBC # BLD: 16.1 K/UL — HIGH (ref 3.8–10.5)
WBC # FLD AUTO: 16.1 K/UL — HIGH (ref 3.8–10.5)

## 2018-02-19 PROCEDURE — 71046 X-RAY EXAM CHEST 2 VIEWS: CPT | Mod: 26

## 2018-02-19 PROCEDURE — 74177 CT ABD & PELVIS W/CONTRAST: CPT | Mod: 26

## 2018-02-19 PROCEDURE — 99285 EMERGENCY DEPT VISIT HI MDM: CPT

## 2018-02-19 PROCEDURE — 71045 X-RAY EXAM CHEST 1 VIEW: CPT | Mod: 26

## 2018-02-19 RX ORDER — SODIUM CHLORIDE 9 MG/ML
1000 INJECTION INTRAMUSCULAR; INTRAVENOUS; SUBCUTANEOUS ONCE
Qty: 0 | Refills: 0 | Status: COMPLETED | OUTPATIENT
Start: 2018-02-19 | End: 2018-02-19

## 2018-02-19 RX ADMIN — SODIUM CHLORIDE 1000 MILLILITER(S): 9 INJECTION INTRAMUSCULAR; INTRAVENOUS; SUBCUTANEOUS at 19:53

## 2018-02-19 RX ADMIN — SODIUM CHLORIDE 1000 MILLILITER(S): 9 INJECTION INTRAMUSCULAR; INTRAVENOUS; SUBCUTANEOUS at 17:40

## 2018-02-19 RX ADMIN — SODIUM CHLORIDE 3000 MILLILITER(S): 9 INJECTION INTRAMUSCULAR; INTRAVENOUS; SUBCUTANEOUS at 20:54

## 2018-02-19 NOTE — ED PROVIDER NOTE - CHPI ED SYMPTOMS NEG
Please make an appointment to follow up with your primary care provider in 2-3 days for recheck and consider tilt table testing or referral for eeg.      Discharge Instructions  Syncope    Syncope (fainting) is a sudden, short loss of consciousness (passing out spell). People will usually fall to the ground when they faint or slump over if seated.  At this time your doctor does not find that your fainting spell is a sign of anything dangerous or life-threatening.  However, sometimes the signs of serious illness do not show up right away.  If you have new or worse symptoms, you may need to be seen again in the Emergency Department or by your primary doctor. Be sure to follow up as directed, or at least within 1 week.      Return to the Emergency Department if:    You faint again.     You have any significant bleeding.    You have chest pain or fast heartbeat, or if your heartbeat is irregular or skipping beats.    You feel short of breath.    You cough up any blood.    You have belly (abdominal) pain or unusual back pain.    You have ongoing vomiting (throwing up) or diarrhea, or have a black or tarry bowel movement or blood in the bowels or blood in your vomit.    You have a fever over 101 degrees.    You lose feeling or cannot move a part of your body or cannot talk normally.    You are confused, have a headache, cannot see well, or have a seizure.    DO NOT DRIVE. CALL 911 INSTEAD!    What can I do to help myself?    Follow any specific instructions that your provider discussed with you.    If you feel light-headed, make sure to sit down right away, even if you have to sit on the floor.    Follow up with your regular medical doctor as discussed for further management. This may include lowering your blood pressure medications, insulin or other diabetic medications, checking your blood sugar more frequently, and drinking more fluids, taking medicines for vomiting or diarrhea or getting up slower.  If you were given  a prescription for medicine here today, be sure to read all of the information (including the package insert) that comes with your prescription.  This will include important information about the medicine, its side effects, and any warnings that you need to know about.  The pharmacist who fills the prescription can provide more information and answer questions you may have about the medicine.  If you have questions or concerns that the pharmacist cannot address, please call or return to the Emergency Department.       Remember that you can always come back to the Emergency Department if you are not able to see your regular doctor in the amount of time listed above, if you get any new symptoms, or if there is anything that worries you.       no chest pain

## 2018-02-19 NOTE — ED PROVIDER NOTE - CARE PLAN
Principal Discharge DX:	Anorexia  Secondary Diagnosis:	Dehydration, severe  Secondary Diagnosis:	Failure to thrive in adult

## 2018-02-19 NOTE — ED PROVIDER NOTE - PROGRESS NOTE DETAILS
ED Sign Out, FTT, dehydration, pending 2nd VBG/lactate, no AMS, plan d/w Hospitalist for admission.  - Darryl Ramos MD improving VS, feels better w/ IVF, clear chest, soft benign abd, no fevers, hospitalist requesting CT abd prior to admission -- Darryl Ramos MD

## 2018-02-19 NOTE — ED ADULT TRIAGE NOTE - CHIEF COMPLAINT QUOTE
pt with vomiting onset yesterday with weakness dizziness diagnosed with esophageal cancer 2 weeks ago unable to keep anything down

## 2018-02-19 NOTE — ED ADULT NURSE NOTE - OBJECTIVE STATEMENT
63 y/o M, A&Ox4, enters ED w/ c/o SOB. Pt. recently dx. w/ esophogeal cancer and also reports that he was recently dx. w/ a brain lesion and lesions on liver and lung. Pt. recently seen and treated in this hospital - d/c on 2/9. Pt. reports SOB has worsened the past 2 days accompanied by n/v. No diarrhea. Pt. also reports increasing weakness over the past week. Pt. reports inability tolerate PO and a 10 lb. weight loss in the past 2 weeks. As per wife, pt. has been losing about 1-2 lbs. daily. Pt. also endorses constipation. No chest pain/fever/chills. Pt. did receive flu vaccine this year. Pt.'s O2 sat 96% on RA, but supplemental O2 provided for pt. for increased comfort. Skin warm, dry and intact. Safety maintained. Family at bedside. 63 y/o M, A&Ox4, enters ED w/ c/o SOB. Pt. recently dx. w/ esophogeal cancer and also reports that he was recently dx. w/ a brain lesion and lesions on liver and lung. Pt. recently seen and treated in this hospital - d/c on 2/9. Pt. reports SOB has worsened the past 2 days accompanied by n/v. No diarrhea. Pt. also reports increasing weakness over the past week. Pt. reports inability tolerate PO and a 10 lb. weight loss in the past 2 weeks. As per wife, pt. has been losing about 1-2 lbs. daily. Pt. also endorses constipation. No chest pain/fever/chills. Pt. did receive flu vaccine this year. Pt.'s O2 sat 96% on RA, but supplemental O2 provided for pt. for increased comfort. Pt. reports SOB is constant but worsens w/ exertion. Pt. becomes SOB w/ speaking full sentences. Skin warm, dry and intact. Safety maintained. Family at bedside. 63 y/o M, A&Ox4, enters ED w/ c/o SOB. Pt. recently dx. w/ esophogeal cancer and also reports that he was recently dx. w/ a brain lesion and lesions on liver and lung. Pt. recently seen and treated in this hospital - d/c on 2/9. Pt. reports SOB has worsened the past 2 days accompanied by n/v. No diarrhea. Pt. also reports increasing weakness over the past week. Pt. reports inability tolerate PO and a 10 lb. weight loss in the past 2 weeks. As per wife, pt. has been losing about 1-2 lbs. daily. Pt. also endorses constipation. No chest pain/fever/chills. Pt. did receive flu vaccine this year. Pt.'s O2 sat 96% on RA, but supplemental O2 provided for pt. for increased comfort. Pt. reports SOB is constant but worsens w/ exertion. Pt. becomes SOB w/ speaking full sentences. No weakness/numbness. Pt. moves all extremities. Pupils 3 mm in size, PERRL. Skin warm, dry and intact. Safety maintained. Family at bedside. 63 y/o M, A&Ox4, enters ED w/ c/o SOB. Pt. recently dx. w/ esophogeal cancer and also reports that he was recently dx. w/ a brain lesion and lesions on liver and lung. Pt. recently seen and treated in this hospital - d/c on 2/9. Pt. reports SOB has worsened the past 2 days accompanied by n/v. No diarrhea. Pt. also reports increasing weakness over the past week. Pt. reports inability tolerate PO and a 10 lb. weight loss in the past 2 weeks. As per wife, pt. has been losing about 1-2 lbs. daily. Pt. also endorses constipation. No chest pain/fever/chills. Pt. did receive flu vaccine this year. Pt.'s O2 sat 96% on RA, but supplemental O2 provided for pt. for increased comfort. Pt. reports SOB is constant but worsens w/ exertion. Pt. becomes SOB w/ speaking full sentences. Lung sounds clear bilaterally. No weakness/numbness. Pt. moves all extremities. Pupils 3 mm in size, PERRL. Skin warm, dry and intact. Safety maintained. Family at bedside.

## 2018-02-19 NOTE — ED ADULT NURSE REASSESSMENT NOTE - NS ED NURSE REASSESS COMMENT FT1
Report received from RICKY Salas. Patient resting in bed, first NS bolus running, as per MD repeat VBG to be drawn after first liter. Patient with no current complaints. VSS. Safety and comfort maintained.

## 2018-02-19 NOTE — ED PROVIDER NOTE - OBJECTIVE STATEMENT
61 yo m recently diagnosed with esophageal cancer presents with weakness, nausea, vomiting, and increasing sob over the past 2 days. pt states he his weakness has been worsening over the past week. has visual changes, constipation. unable to tolerate po intake for last 2 days. denies cp. 61 yo m hx DM, TIA, recently diagnosed with metastatic esophageal cancer presents with weakness, nausea, vomiting, and increasing sob over the past 2 days. pt states he his weakness has been worsening over the past week. unable to tolerate po intake for last 2 days. No BMs. Still urinating. +dizzy on standing. denies cp, palp. +upper abd pain (unchanged). [place] [facility] [place] and wife states they [place] them back to [place] at [facility] [place].   Onc-D'Olimpio  Rad Onc-Cecilia

## 2018-02-19 NOTE — ED PROVIDER NOTE - MEDICAL DECISION MAKING DETAILS
suspect starvation ketosis. 62y M hx dm, tia, recently diagnosed esophageal cancer with liver, lung and brain mets. Has not yet initiated chemo or RT. Following with Dr Marin and Dr Brooks. Started on Decadron for brain mets recently. Dec ability to tolerate PO in last 2 days with vomiting. Abd minimally tender on exam other than episgastric and RUQ pain which have been ongoing. Labs, sent, IV fluids started. Lactate on VBG elevated to 10.5. Suspect starvation ketosis with elevated AG. However not acidotic. Will rpt lactate after IVF. Pending Admit.

## 2018-02-19 NOTE — ED PROVIDER NOTE - PHYSICAL EXAMINATION
Gen: WNWD NAD  HEENT: NCAT PERRL EOMI normal pharynx  Neck: supple  CV: RRR, no murmur  Lung: CTA BL  Abd: +BS soft NTND  Ext: wwp, palp pulses, FROMx4, no cce  Neuro: CN grossly intact, sensation intact, motor 5/5 throughout Gen: Thin male NAD  HEENT: NCAT PERRL EOMI normal pharynx dry musouc memb  Neck: supple  CV: RRR, no murmur  Lung: CTA BL  Abd: +BS soft mild epigastric and RUQ TTP. prominent liver margin  Ext: wwp, palp pulses, FROMx4, no cce  Neuro: A&Ox3. CN grossly intact, sensation intact, motor 5/5 throughout

## 2018-02-20 DIAGNOSIS — R79.89 OTHER SPECIFIED ABNORMAL FINDINGS OF BLOOD CHEMISTRY: ICD-10-CM

## 2018-02-20 DIAGNOSIS — C15.9 MALIGNANT NEOPLASM OF ESOPHAGUS, UNSPECIFIED: ICD-10-CM

## 2018-02-20 DIAGNOSIS — E87.2 ACIDOSIS: ICD-10-CM

## 2018-02-20 DIAGNOSIS — R62.7 ADULT FAILURE TO THRIVE: ICD-10-CM

## 2018-02-20 DIAGNOSIS — R65.10 SYSTEMIC INFLAMMATORY RESPONSE SYNDROME (SIRS) OF NON-INFECTIOUS ORIGIN WITHOUT ACUTE ORGAN DYSFUNCTION: ICD-10-CM

## 2018-02-20 DIAGNOSIS — R63.8 OTHER SYMPTOMS AND SIGNS CONCERNING FOOD AND FLUID INTAKE: ICD-10-CM

## 2018-02-20 DIAGNOSIS — R63.0 ANOREXIA: ICD-10-CM

## 2018-02-20 DIAGNOSIS — E83.52 HYPERCALCEMIA: ICD-10-CM

## 2018-02-20 DIAGNOSIS — Z71.89 OTHER SPECIFIED COUNSELING: ICD-10-CM

## 2018-02-20 DIAGNOSIS — E11.9 TYPE 2 DIABETES MELLITUS WITHOUT COMPLICATIONS: ICD-10-CM

## 2018-02-20 LAB
ALBUMIN SERPL ELPH-MCNC: 3.1 G/DL — LOW (ref 3.3–5)
ALP SERPL-CCNC: 352 U/L — HIGH (ref 40–120)
ALT FLD-CCNC: 135 U/L RC — HIGH (ref 10–45)
ANION GAP SERPL CALC-SCNC: 17 MMOL/L — SIGNIFICANT CHANGE UP (ref 5–17)
AST SERPL-CCNC: 175 U/L — HIGH (ref 10–40)
BASOPHILS # BLD AUTO: 0.1 K/UL — SIGNIFICANT CHANGE UP (ref 0–0.2)
BASOPHILS NFR BLD AUTO: 0.4 % — SIGNIFICANT CHANGE UP (ref 0–2)
BILIRUB SERPL-MCNC: 1.3 MG/DL — HIGH (ref 0.2–1.2)
BUN SERPL-MCNC: 30 MG/DL — HIGH (ref 7–23)
CALCIUM SERPL-MCNC: 8.9 MG/DL — SIGNIFICANT CHANGE UP (ref 8.4–10.5)
CHLORIDE SERPL-SCNC: 101 MMOL/L — SIGNIFICANT CHANGE UP (ref 96–108)
CO2 SERPL-SCNC: 21 MMOL/L — LOW (ref 22–31)
CREAT SERPL-MCNC: 0.7 MG/DL — SIGNIFICANT CHANGE UP (ref 0.5–1.3)
CULTURE RESULTS: NO GROWTH — SIGNIFICANT CHANGE UP
EOSINOPHIL # BLD AUTO: 0 K/UL — SIGNIFICANT CHANGE UP (ref 0–0.5)
EOSINOPHIL NFR BLD AUTO: 0.2 % — SIGNIFICANT CHANGE UP (ref 0–6)
GAS PNL BLDV: SIGNIFICANT CHANGE UP
GLUCOSE SERPL-MCNC: 97 MG/DL — SIGNIFICANT CHANGE UP (ref 70–99)
HCT VFR BLD CALC: 41.1 % — SIGNIFICANT CHANGE UP (ref 39–50)
HGB BLD-MCNC: 13.9 G/DL — SIGNIFICANT CHANGE UP (ref 13–17)
LYMPHOCYTES # BLD AUTO: 1.2 K/UL — SIGNIFICANT CHANGE UP (ref 1–3.3)
LYMPHOCYTES # BLD AUTO: 9 % — LOW (ref 13–44)
MAGNESIUM SERPL-MCNC: 2.3 MG/DL — SIGNIFICANT CHANGE UP (ref 1.6–2.6)
MCHC RBC-ENTMCNC: 28.4 PG — SIGNIFICANT CHANGE UP (ref 27–34)
MCHC RBC-ENTMCNC: 33.9 GM/DL — SIGNIFICANT CHANGE UP (ref 32–36)
MCV RBC AUTO: 83.6 FL — SIGNIFICANT CHANGE UP (ref 80–100)
MONOCYTES # BLD AUTO: 1.2 K/UL — HIGH (ref 0–0.9)
MONOCYTES NFR BLD AUTO: 8.9 % — SIGNIFICANT CHANGE UP (ref 2–14)
NEUTROPHILS # BLD AUTO: 11.2 K/UL — HIGH (ref 1.8–7.4)
NEUTROPHILS NFR BLD AUTO: 81.5 % — HIGH (ref 43–77)
PHOSPHATE SERPL-MCNC: 2.6 MG/DL — SIGNIFICANT CHANGE UP (ref 2.5–4.5)
PLATELET # BLD AUTO: 466 K/UL — HIGH (ref 150–400)
POTASSIUM SERPL-MCNC: 4.6 MMOL/L — SIGNIFICANT CHANGE UP (ref 3.5–5.3)
POTASSIUM SERPL-SCNC: 4.6 MMOL/L — SIGNIFICANT CHANGE UP (ref 3.5–5.3)
PROT SERPL-MCNC: 6.6 G/DL — SIGNIFICANT CHANGE UP (ref 6–8.3)
RBC # BLD: 4.92 M/UL — SIGNIFICANT CHANGE UP (ref 4.2–5.8)
RBC # FLD: 14.1 % — SIGNIFICANT CHANGE UP (ref 10.3–14.5)
SODIUM SERPL-SCNC: 139 MMOL/L — SIGNIFICANT CHANGE UP (ref 135–145)
SPECIMEN SOURCE: SIGNIFICANT CHANGE UP
WBC # BLD: 13.8 K/UL — HIGH (ref 3.8–10.5)
WBC # FLD AUTO: 13.8 K/UL — HIGH (ref 3.8–10.5)

## 2018-02-20 PROCEDURE — 12345: CPT | Mod: GC,NC

## 2018-02-20 PROCEDURE — 99254 IP/OBS CNSLTJ NEW/EST MOD 60: CPT | Mod: GC

## 2018-02-20 PROCEDURE — 99223 1ST HOSP IP/OBS HIGH 75: CPT

## 2018-02-20 PROCEDURE — 99223 1ST HOSP IP/OBS HIGH 75: CPT | Mod: GC

## 2018-02-20 RX ORDER — DEXAMETHASONE 0.5 MG/5ML
1 ELIXIR ORAL
Qty: 0 | Refills: 0 | COMMUNITY

## 2018-02-20 RX ORDER — ENOXAPARIN SODIUM 100 MG/ML
40 INJECTION SUBCUTANEOUS DAILY
Qty: 0 | Refills: 0 | Status: DISCONTINUED | OUTPATIENT
Start: 2018-02-20 | End: 2018-02-21

## 2018-02-20 RX ORDER — MORPHINE SULFATE 50 MG/1
4 CAPSULE, EXTENDED RELEASE ORAL EVERY 6 HOURS
Qty: 0 | Refills: 0 | Status: DISCONTINUED | OUTPATIENT
Start: 2018-02-20 | End: 2018-02-22

## 2018-02-20 RX ORDER — SODIUM CHLORIDE 9 MG/ML
1000 INJECTION INTRAMUSCULAR; INTRAVENOUS; SUBCUTANEOUS
Qty: 0 | Refills: 0 | Status: DISCONTINUED | OUTPATIENT
Start: 2018-02-20 | End: 2018-02-20

## 2018-02-20 RX ORDER — GLUCAGON INJECTION, SOLUTION 0.5 MG/.1ML
1 INJECTION, SOLUTION SUBCUTANEOUS ONCE
Qty: 0 | Refills: 0 | Status: DISCONTINUED | OUTPATIENT
Start: 2018-02-20 | End: 2018-02-23

## 2018-02-20 RX ORDER — ONDANSETRON 8 MG/1
4 TABLET, FILM COATED ORAL EVERY 6 HOURS
Qty: 0 | Refills: 0 | Status: DISCONTINUED | OUTPATIENT
Start: 2018-02-20 | End: 2018-03-01

## 2018-02-20 RX ORDER — CANAGLIFLOZIN 100 MG/1
1 TABLET, FILM COATED ORAL
Qty: 0 | Refills: 0 | COMMUNITY

## 2018-02-20 RX ORDER — ACETAMINOPHEN 500 MG
1000 TABLET ORAL ONCE
Qty: 0 | Refills: 0 | Status: DISCONTINUED | OUTPATIENT
Start: 2018-02-20 | End: 2018-02-20

## 2018-02-20 RX ORDER — MORPHINE SULFATE 50 MG/1
2 CAPSULE, EXTENDED RELEASE ORAL EVERY 6 HOURS
Qty: 0 | Refills: 0 | Status: DISCONTINUED | OUTPATIENT
Start: 2018-02-20 | End: 2018-02-22

## 2018-02-20 RX ORDER — SODIUM CHLORIDE 9 MG/ML
1000 INJECTION, SOLUTION INTRAVENOUS
Qty: 0 | Refills: 0 | Status: DISCONTINUED | OUTPATIENT
Start: 2018-02-20 | End: 2018-02-22

## 2018-02-20 RX ORDER — SITAGLIPTIN AND METFORMIN HYDROCHLORIDE 500; 50 MG/1; MG/1
1 TABLET, FILM COATED ORAL
Qty: 0 | Refills: 0 | COMMUNITY

## 2018-02-20 RX ORDER — PANTOPRAZOLE SODIUM 20 MG/1
1 TABLET, DELAYED RELEASE ORAL
Qty: 0 | Refills: 0 | COMMUNITY

## 2018-02-20 RX ORDER — FAMOTIDINE 10 MG/ML
0 INJECTION INTRAVENOUS
Qty: 0 | Refills: 0 | COMMUNITY

## 2018-02-20 RX ORDER — MORPHINE SULFATE 50 MG/1
4 CAPSULE, EXTENDED RELEASE ORAL EVERY 6 HOURS
Qty: 0 | Refills: 0 | Status: DISCONTINUED | OUTPATIENT
Start: 2018-02-20 | End: 2018-02-20

## 2018-02-20 RX ORDER — INSULIN LISPRO 100/ML
VIAL (ML) SUBCUTANEOUS
Qty: 0 | Refills: 0 | Status: DISCONTINUED | OUTPATIENT
Start: 2018-02-20 | End: 2018-02-23

## 2018-02-20 RX ORDER — HEPARIN SODIUM 5000 [USP'U]/ML
5000 INJECTION INTRAVENOUS; SUBCUTANEOUS EVERY 8 HOURS
Qty: 0 | Refills: 0 | Status: DISCONTINUED | OUTPATIENT
Start: 2018-02-20 | End: 2018-02-20

## 2018-02-20 RX ORDER — DEXTROSE 50 % IN WATER 50 %
1 SYRINGE (ML) INTRAVENOUS ONCE
Qty: 0 | Refills: 0 | Status: DISCONTINUED | OUTPATIENT
Start: 2018-02-20 | End: 2018-02-23

## 2018-02-20 RX ORDER — PANTOPRAZOLE SODIUM 20 MG/1
40 TABLET, DELAYED RELEASE ORAL
Qty: 0 | Refills: 0 | Status: DISCONTINUED | OUTPATIENT
Start: 2018-02-20 | End: 2018-03-01

## 2018-02-20 RX ORDER — DEXTROSE 50 % IN WATER 50 %
12.5 SYRINGE (ML) INTRAVENOUS ONCE
Qty: 0 | Refills: 0 | Status: DISCONTINUED | OUTPATIENT
Start: 2018-02-20 | End: 2018-02-23

## 2018-02-20 RX ORDER — PIPERACILLIN AND TAZOBACTAM 4; .5 G/20ML; G/20ML
3.38 INJECTION, POWDER, LYOPHILIZED, FOR SOLUTION INTRAVENOUS EVERY 8 HOURS
Qty: 0 | Refills: 0 | Status: DISCONTINUED | OUTPATIENT
Start: 2018-02-20 | End: 2018-02-20

## 2018-02-20 RX ORDER — INSULIN LISPRO 100/ML
VIAL (ML) SUBCUTANEOUS AT BEDTIME
Qty: 0 | Refills: 0 | Status: DISCONTINUED | OUTPATIENT
Start: 2018-02-20 | End: 2018-02-23

## 2018-02-20 RX ORDER — GLYBURIDE 5 MG
0.5 TABLET ORAL
Qty: 0 | Refills: 0 | COMMUNITY

## 2018-02-20 RX ORDER — SODIUM CHLORIDE 9 MG/ML
1000 INJECTION, SOLUTION INTRAVENOUS
Qty: 0 | Refills: 0 | Status: DISCONTINUED | OUTPATIENT
Start: 2018-02-20 | End: 2018-02-23

## 2018-02-20 RX ORDER — DEXTROSE 50 % IN WATER 50 %
25 SYRINGE (ML) INTRAVENOUS ONCE
Qty: 0 | Refills: 0 | Status: DISCONTINUED | OUTPATIENT
Start: 2018-02-20 | End: 2018-02-23

## 2018-02-20 RX ORDER — MORPHINE SULFATE 50 MG/1
2 CAPSULE, EXTENDED RELEASE ORAL EVERY 6 HOURS
Qty: 0 | Refills: 0 | Status: DISCONTINUED | OUTPATIENT
Start: 2018-02-20 | End: 2018-02-20

## 2018-02-20 RX ORDER — DEXAMETHASONE 0.5 MG/5ML
4 ELIXIR ORAL
Qty: 0 | Refills: 0 | Status: DISCONTINUED | OUTPATIENT
Start: 2018-02-20 | End: 2018-02-27

## 2018-02-20 RX ADMIN — PANTOPRAZOLE SODIUM 40 MILLIGRAM(S): 20 TABLET, DELAYED RELEASE ORAL at 05:33

## 2018-02-20 RX ADMIN — Medication 4 MILLIGRAM(S): at 18:39

## 2018-02-20 RX ADMIN — ENOXAPARIN SODIUM 40 MILLIGRAM(S): 100 INJECTION SUBCUTANEOUS at 13:12

## 2018-02-20 RX ADMIN — Medication 1: at 13:11

## 2018-02-20 RX ADMIN — MORPHINE SULFATE 4 MILLIGRAM(S): 50 CAPSULE, EXTENDED RELEASE ORAL at 13:11

## 2018-02-20 RX ADMIN — Medication 4 MILLIGRAM(S): at 05:33

## 2018-02-20 RX ADMIN — SODIUM CHLORIDE 75 MILLILITER(S): 9 INJECTION INTRAMUSCULAR; INTRAVENOUS; SUBCUTANEOUS at 04:20

## 2018-02-20 RX ADMIN — ONDANSETRON 4 MILLIGRAM(S): 8 TABLET, FILM COATED ORAL at 04:15

## 2018-02-20 RX ADMIN — SODIUM CHLORIDE 75 MILLILITER(S): 9 INJECTION, SOLUTION INTRAVENOUS at 05:33

## 2018-02-20 NOTE — H&P ADULT - NSHPPHYSICALEXAM_GEN_ALL_CORE
Vital Signs Last 24 Hrs  T(C): 36.5 (20 Feb 2018 01:43), Max: 36.8 (19 Feb 2018 19:20)  T(F): 97.7 (20 Feb 2018 01:43), Max: 98.2 (19 Feb 2018 19:20)  HR: 97 (20 Feb 2018 01:43) (89 - 107)  BP: 111/66 (20 Feb 2018 01:43) (111/66 - 136/85)  BP(mean): --  RR: 18 (20 Feb 2018 01:43) (17 - 20)  SpO2: 99% (20 Feb 2018 01:43) (96% - 99%)  PHYSICAL EXAM:  GENERAL: NAD, well-groomed, well-developed  HEAD:  Atraumatic, Normocephalic  EYES: EOMI, PERRLA, conjunctiva and sclera clear  ENMT: No tonsillar erythema, exudates, or enlargement; Moist mucous membranes, Good dentition, No lesions  NECK: Supple, No JVD, Normal thyroid  CHEST/LUNG: Clear to percussion bilaterally; No rales, rhonchi, wheezing, or rubs  HEART: Regular rate and rhythm; No murmurs, rubs, or gallops  ABDOMEN: Soft, Nontender, Nondistended; Bowel sounds present  EXTREMITIES:  2+ Peripheral Pulses, No clubbing, cyanosis, or edema  LYMPH: No lymphadenopathy noted  SKIN: No rashes or lesions  NERVOUS SYSTEM:  Alert & Oriented X3, Good concentration; Motor Strength 5/5 B/L upper and lower extremities; DTRs 2+ intact and symmetric Vital Signs Last 24 Hrs  T(C): 36.5 (20 Feb 2018 01:43), Max: 36.8 (19 Feb 2018 19:20)  T(F): 97.7 (20 Feb 2018 01:43), Max: 98.2 (19 Feb 2018 19:20)  HR: 97 (20 Feb 2018 01:43) (89 - 107)  BP: 111/66 (20 Feb 2018 01:43) (111/66 - 136/85)  BP(mean): --  RR: 18 (20 Feb 2018 01:43) (17 - 20)  SpO2: 99% (20 Feb 2018 01:43) (96% - 99%)  PHYSICAL EXAM:  GENERAL: weak  HEAD:  Atraumatic, Normocephalic  EYES: EOMI, PERRLA, conjunctiva and sclera clear  ENMT: No tonsillar erythema, exudates, or enlargement; dry mucous membranes  NECK: Supple, No JVD, Normal thyroid  CHEST/LUNG: Clear to percussion bilaterally; No rales, rhonchi, wheezing, or rubs  HEART: Regular rate and rhythm; No murmurs, rubs, or gallops  ABDOMEN: Soft, Nontender, Nondistended; Bowel sounds present  EXTREMITIES:  2+ Peripheral Pulses, No clubbing, cyanosis, or edema  LYMPH: No lymphadenopathy noted  SKIN: No rashes or lesions  NERVOUS SYSTEM:  Alert & Oriented X3

## 2018-02-20 NOTE — PATIENT PROFILE ADULT. - PRO PAIN EXPRESSION
Request placed with [facility] pharmacy to check insurance coverage for home IV abx.  Await updates.  Will follow.    Addendum:  This patient has 100% coverage for home IV abx.  Will follow for orders.     verbalization

## 2018-02-20 NOTE — PROGRESS NOTE ADULT - PROBLEM SELECTOR PLAN 3
-AG 32 most likely in setting of elevated lactic acid, most likely Type B in setting of malignancy and starvation/fasting ketosis evidenced by U/A showing + ketones, and eleavated beta-hydroxybutyrate, unlikely DKA as glucose improving  -suspect AG will improve, now patient s/p 3L fluids, lactate has improved from 10.9 to 4.8, will continue w/ D5+0.45NS@ 75cc's/hr  -patient most likely w/ starvation ketosis in setting of malnutrition, minimal oral/fluid intake  -nutrition consult  -repeat CMP, will need to monitor for re-feeding syndrome, will check electrolytes - Poorly differentiated adenocarcinoma of the esophagus with extensive liver, pulm, brain mets. CTAP shows no change from previous: Distal esophageal mass, diffuse pulmonary, mediastinal, hilar and hepatic metastatic disease  -continue w/ decadron  - Gastroenterology consulted for possible PEG placement. Onco recs pending.  - C/w with Oral Morphine as needed, tolerating well.

## 2018-02-20 NOTE — PROGRESS NOTE ADULT - PROBLEM SELECTOR PLAN 2
-management as above - Improving.   - Patient most likely w/ starvation ketosis in setting of malnutrition, minimal oral/fluid intake.  - Replete electrolytes as needed.

## 2018-02-20 NOTE — CONSULT NOTE ADULT - SUBJECTIVE AND OBJECTIVE BOX
HPI: 61 y/o M with hx of TIA (1/19/2018), WPW s/p ablation (7/2016), DM II, BPH, Stage IV Adenocarcinoma of the Esophagus (dx 2/6/2018) c/b metastases to the liver, lungs and brain. He was referred for radiation and chemotherapy, and obtained second opinion at [facility] [place]. He presents with progressive weakness, nausea, clear emesis and decreased appetite over the past week. He also has persistent abdominal pain over the past 2 weeks, and reduced oral intake. He has not had fever, chills or dysuria.  Note that the pt has a loop recorder in place. GI was consulted for possible esophageal stent placement. He was found to have lactic acidosis and leukocytosis.    PAST MEDICAL & SURGICAL HISTORY:  Esophageal cancer  TIA (transient ischemic attack)  Diabetes  H/O prior ablation treatment: for WPW  Status post placement of implantable loop recorder    REVIEW OF SYSTEMS: negative except as per the HPI.    MEDICATIONS  (STANDING):  dexamethasone     Tablet 4 milliGRAM(s) Oral two times a day  dextrose 5% + sodium chloride 0.45%. 1000 milliLiter(s) (75 mL/Hr) IV Continuous <Continuous>  dextrose 5%. 1000 milliLiter(s) (50 mL/Hr) IV Continuous <Continuous>  dextrose 50% Injectable 12.5 Gram(s) IV Push once  dextrose 50% Injectable 25 Gram(s) IV Push once  dextrose 50% Injectable 25 Gram(s) IV Push once  enoxaparin Injectable 40 milliGRAM(s) SubCutaneous daily  insulin lispro (HumaLOG) corrective regimen sliding scale   SubCutaneous three times a day before meals  insulin lispro (HumaLOG) corrective regimen sliding scale   SubCutaneous at bedtime  pantoprazole    Tablet 40 milliGRAM(s) Oral before breakfast    ALLERGIES: NKDA    SOCIAL HISTORY:  - Alcohol: denies  - Recreational drug use: denies    FAMILY HISTORY:  Family history of hypertension (Father)    Vital Signs Last 24 Hrs  T(C): 36.7 (20 Feb 2018 15:38), Max: 36.8 (19 Feb 2018 19:20)  T(F): 98.1 (20 Feb 2018 15:38), Max: 98.2 (19 Feb 2018 19:20)  HR: 93 (20 Feb 2018 15:38) (84 - 107)  BP: 118/78 (20 Feb 2018 15:38) (111/66 - 136/85)  BP(mean): --  RR: 18 (20 Feb 2018 15:38) (17 - 20)  SpO2: 96% (20 Feb 2018 15:38) (91% - 99%)    PHYSICAL EXAM:  Constitutional: no acute distress  Eyes: no icterus  Neck: no masses, no LAD  Respiratory: normal inspiratory effort; no wheezing or crackles  Cardiovascular: RRR, normal S1/S2, no murmurs/rubs/gallops  Gastrointestinal: soft, nondistended, nontender, +BS  Extremities: no LE edema  Neurological: AAOx3, no asterixis  Skin: no rashes, bruises, petechiae    LABS:                        13.9   13.8  )-----------( 466      ( 20 Feb 2018 06:32 )             41.1     139  |  101  |  30<H>  ----------------------------<  97  4.6   |  21<L>  |  0.70    Ca    8.9      20 Feb 2018 06:35  Phos  2.6     02-20  Mg     2.3     02-20    TPro  6.6  /  Alb  3.1<L>  /  TBili  1.3<H>  /  DBili  x   /  AST  175<H>  /  ALT  135<H>  /  AlkPhos  352<H>  02-20  PT/INR - ( 19 Feb 2018 17:43 )   PT: 12.9 sec;   INR: 1.19 ratio    PTT - ( 19 Feb 2018 17:43 )  PTT:24.9 sec  LIVER FUNCTIONS - ( 20 Feb 2018 06:35 )  Alb: 3.1 g/dL / Pro: 6.6 g/dL / ALK PHOS: 352 U/L / ALT: 135 U/L RC / AST: 175 U/L / GGT: x HPI: 63 y/o M with hx of TIA (1/19/2018), WPW s/p ablation (7/2016), DM II, BPH, Stage IV Adenocarcinoma of the Esophagus (dx 2/6/2018) c/b metastases to the liver, lungs and brain. He was referred for radiation and chemotherapy, and obtained second opinion at [facility] [place]. He presents with progressive weakness, nausea, clear emesis and decreased appetite over the past week. This is associated with feeling of food getting stuck in his lower chest. He also has persistent abdominal pain over the past 2 weeks, and reduced oral intake. He has not had fever, chills or dysuria.  Note that the pt has a loop recorder in place. GI was consulted for possible esophageal stent placement. He was found to have lactic acidosis and leukocytosis.    PAST MEDICAL & SURGICAL HISTORY:  Esophageal cancer  TIA (transient ischemic attack)  Diabetes  H/O prior ablation treatment: for WPW  Status post placement of implantable loop recorder    REVIEW OF SYSTEMS: negative except as per the HPI.    MEDICATIONS  (STANDING):  dexamethasone     Tablet 4 milliGRAM(s) Oral two times a day  dextrose 5% + sodium chloride 0.45%. 1000 milliLiter(s) (75 mL/Hr) IV Continuous <Continuous>  dextrose 5%. 1000 milliLiter(s) (50 mL/Hr) IV Continuous <Continuous>  dextrose 50% Injectable 12.5 Gram(s) IV Push once  dextrose 50% Injectable 25 Gram(s) IV Push once  dextrose 50% Injectable 25 Gram(s) IV Push once  enoxaparin Injectable 40 milliGRAM(s) SubCutaneous daily  insulin lispro (HumaLOG) corrective regimen sliding scale   SubCutaneous three times a day before meals  insulin lispro (HumaLOG) corrective regimen sliding scale   SubCutaneous at bedtime  pantoprazole    Tablet 40 milliGRAM(s) Oral before breakfast    ALLERGIES: NKDA    SOCIAL HISTORY:  - Alcohol: denies  - Recreational drug use: denies    FAMILY HISTORY:  Family history of hypertension (Father)    Vital Signs Last 24 Hrs  T(C): 36.7 (20 Feb 2018 15:38), Max: 36.8 (19 Feb 2018 19:20)  T(F): 98.1 (20 Feb 2018 15:38), Max: 98.2 (19 Feb 2018 19:20)  HR: 93 (20 Feb 2018 15:38) (84 - 107)  BP: 118/78 (20 Feb 2018 15:38) (111/66 - 136/85)  BP(mean): --  RR: 18 (20 Feb 2018 15:38) (17 - 20)  SpO2: 96% (20 Feb 2018 15:38) (91% - 99%)    PHYSICAL EXAM:  Constitutional: no acute distress  Eyes: no icterus  Neck: no masses, no LAD  Respiratory: normal inspiratory effort; no wheezing or crackles  Cardiovascular: RRR, normal S1/S2, no murmurs/rubs/gallops  Gastrointestinal: soft, nondistended, nontender, +BS  Extremities: no LE edema  Neurological: AAOx3, no asterixis  Skin: no rashes, bruises, petechiae    LABS:                        13.9   13.8  )-----------( 466      ( 20 Feb 2018 06:32 )             41.1     139  |  101  |  30<H>  ----------------------------<  97  4.6   |  21<L>  |  0.70    Ca    8.9      20 Feb 2018 06:35  Phos  2.6     02-20  Mg     2.3     02-20    TPro  6.6  /  Alb  3.1<L>  /  TBili  1.3<H>  /  DBili  x   /  AST  175<H>  /  ALT  135<H>  /  AlkPhos  352<H>  02-20  PT/INR - ( 19 Feb 2018 17:43 )   PT: 12.9 sec;   INR: 1.19 ratio    PTT - ( 19 Feb 2018 17:43 )  PTT:24.9 sec  LIVER FUNCTIONS - ( 20 Feb 2018 06:35 )  Alb: 3.1 g/dL / Pro: 6.6 g/dL / ALK PHOS: 352 U/L / ALT: 135 U/L RC / AST: 175 U/L / GGT: x

## 2018-02-20 NOTE — PROGRESS NOTE ADULT - PROBLEM SELECTOR PLAN 4
poorly differentiated adenocarcinoma of the esophagus with extensive liver, pulm, brain mets  -CTAP shows no change from previous: Distal   esophageal mass, diffuse pulmonary, mediastinal, hilar and hepatic metastatic disease  -consult onc in AM  -consult GI in AM  -continue w/ decadron - Most likely in setting of hepatic metastases.   - CTM on labs.

## 2018-02-20 NOTE — PROGRESS NOTE ADULT - PROBLEM SELECTOR PLAN 1
-increased lactate 10.7 on admission, now s/p 3L fluids, w/ improved lactate to 4.8  -most likely in setting of Type B lactic acidosis in setting of malignancy vs less likely possible infection as patient meets SIRS criteria w/ increased lactate, leukocytosis, and tachycardia, unclear source  -s/p 3L fluids, continues w/ NS @ 75cc's/hr, U/A-negative for LE/nitrites, will follow up urine culture and blood cultures, will hold off on abx for now as no clear source identified and patient clinically improving w/ fluids  -CTAP-no acute infectious source identified, CXR-no signs of pna  -leukocytosis may also be in setting of steroids vs reactive leukocytosis vs hemoconcentrated  -CBC w/ diff daily, CMP - Improving. Most likely in setting of Type B lactic acidosis in setting of malignancy vs less likely possible infection as patient meets SIRS criteria w/ increased lactate, leukocytosis, and tachycardia, unclear source. CTAP-no acute infectious source identified, CXR-no signs of pna. Cultures pending.

## 2018-02-20 NOTE — H&P ADULT - ASSESSMENT
63M h/o TIA, DM2, BPH, newly diagnosed metastatic poorly differentiated adenocarcinoma of the esophagus with extensive liver, pulm, brain mets, presenting w/ c/o of N/V, weakness, found to be in SIRS,  Anion-Gap Metabolic Acidosis most likely in setting of malignancy and starvation ketosis 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), DM2, BPH, newly diagnosed metastatic poorly differentiated adenocarcinoma of the esophagus with extensive liver, pulm, brain metss, presenting w/ c/o of N/V, weakness, found to be in SIRS,  Anion-Gap Metabolic Acidosis most likely in setting of malignancy and starvation ketosis

## 2018-02-20 NOTE — H&P ADULT - PROBLEM SELECTOR PROBLEM 8
Type 2 diabetes mellitus without complication, without long-term current use of insulin Nutrition, metabolism, and development symptoms

## 2018-02-20 NOTE — H&P ADULT - PROBLEM SELECTOR PLAN 4
poorly differentiated adenocarcinoma of the esophagus with extensive liver, pulm, brain mets  -CTAP shows no change from previous: Distal   esophageal mass, diffuse pulmonary, mediastinal, hilar and hepatic   metastatic disease  -consult onc in AM  -consult GI in AM poorly differentiated adenocarcinoma of the esophagus with extensive liver, pulm, brain mets  -CTAP shows no change from previous: Distal   esophageal mass, diffuse pulmonary, mediastinal, hilar and hepatic   metastatic disease  -consult onc in AM  -consult GI in AM  -continue w/ decadron poorly differentiated adenocarcinoma of the esophagus with extensive liver, pulm, brain mets  -CTAP shows no change from previous: Distal   esophageal mass, diffuse pulmonary, mediastinal, hilar and hepatic metastatic disease  -consult onc in AM  -consult GI in AM  -continue w/ decadron

## 2018-02-20 NOTE — H&P ADULT - PROBLEM SELECTOR PLAN 3
-AG 32 most likely in setting of elevated lactic acid, most likely Type B in setting of malignancy and starvation/fasting ketosis evidenced by U/A showing + ketones, and beta-hydroxybutyrate  -suspect AG will improve, now patient s/p 3L fluids, lactate has improved from 10.9 to 4.8, will continue w/ Ns@ 75cc's/hr  -patient most likely w/ starvation ketosis in setting of malnutrition, minimal oral/fluid intake  -nutrition consult  -repeat BMP in AM -AG 32 most likely in setting of elevated lactic acid, most likely Type B in setting of malignancy and starvation/fasting ketosis evidenced by U/A showing + ketones, and eleavated beta-hydroxybutyrate, unlikely DKA as glucose improving  -suspect AG will improve, now patient s/p 3L fluids, lactate has improved from 10.9 to 4.8, will continue w/ D5+0.45NS@ 75cc's/hr  -patient most likely w/ starvation ketosis in setting of malnutrition, minimal oral/fluid intake  -nutrition consult  -repeat CMP, will need to monitor for re-feeding syndrome, will check electrolytes

## 2018-02-20 NOTE — H&P ADULT - HISTORY OF PRESENT ILLNESS
63M h/o TIA, DM2, BPH, newly diagnosed metastatic poorly differentiated adenocarcinoma of the esophagus with extensive liver, pulm, brain mets, presenting w/ c/o of 62M h/o TIA (1/19 w/ no focal deficits), WPW s/p ablation (7/2016), DM2, BPH, newly diagnosed 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 As per patient, has been feeling weak, and has had two episodes of NBNB vomiting, w/ chronic abdominal pain which has been going on since his diagnosis from recent admission. Also states, he is feeling SOB for the last two days upon exertion. No c/o of CP, palpitations, fevers/chills, uri symptoms, diaphoresis, diarrhea, constipation, dysuria, incontinence. All other ROS is negative. As per wife, patient is not eating. She tries to give him soup, but he has not been tolerating it due to loss of appetite, and has been losing 1-2 lbs/day.    Of note, patient had recent admission 2/6-2/8 where he was newly diagnosed metastatic w/ poorly differentiated adenocarcinoma s/p EGD w/ biopsy. He saw Dr. Ansari (oncologist) outpatient and recommended starting chemotherapy as per wife. Oncologist referred to radiation oncologist, Dr. Brooks who suggested radiation and Gamma Knife. Patient and wife also got second opinion from [facility] day of admission who recommended starting w/ chemotherapy first and possible esophageal stent placement. Patient and wife have not made further decisions regarding treatment yet.     Also of note, patient had TIA 1/19/2018, and was followed by neurologist, and currently has loop recorder placed as per neurologist.    ED vitals: temp 97.6, -->84, /80, RR 20, 96% RA-->2L NC  In the ED: given 3L NS 62M h/o TIA (1/19 w/ no focal deficits), WPW s/p ablation (7/2016), DM2, BPH, newly diagnosed metastatic poorly differentiated adenocarcinoma of the esophagus with extensive liver, pulm, brain mets, presenting w/ c/o of progressive weakness, N/V, and loss of appetite x 2 days. As per patient, has been feeling weak, and has had two episodes of NBNB vomiting, w/ chronic abdominal pain which has been going on since his diagnosis from recent admission. Also states, he is feeling SOB for the last two days upon exertion. No c/o of CP, palpitations, fevers/chills, uri symptoms, diaphoresis, diarrhea, constipation, dysuria, incontinence. All other ROS is negative. As per wife, patient is not eating. She tries to give him soup, but he has not been tolerating it due to loss of appetite, and has been losing 1-2 lbs/day.    Of note, patient had recent admission 2/6-2/8 where he was newly diagnosed metastatic w/ poorly differentiated adenocarcinoma s/p EGD w/ biopsy. He saw Dr. Ansari (oncologist) outpatient and recommended starting chemotherapy as per wife. Oncologist referred to radiation oncologist, Dr. Brooks who suggested radiation and Gamma Knife. Patient and wife also got second opinion from [facility] day of admission who recommended starting w/ chemotherapy first and possible esophageal stent placement. Patient and wife have not made further decisions regarding treatment yet.     Also of note, patient had TIA 1/19/2018, and was followed by neurologist, and currently has loop recorder placed as per neurologist.    ED vitals: temp 97.6, -->84, /80, RR 20, 96% RA-->2L NC  In the ED: given 3L NS

## 2018-02-20 NOTE — H&P ADULT - NSHPLABSRESULTS_GEN_ALL_CORE
Personally Reviewed labs, imaging, EKG by me:      139  |  93<L>  |  46<H>  ----------------------------<  200<H>  5.3   |  14<L>  |  0.95    Ca    10.7<H>      2018 17:43    TPro  7.8  /  Alb  3.8  /  TBili  1.2  /  DBili  x   /  AST  161<H>  /  ALT  170<H>  /  AlkPhos  343<H>        PT/INR - ( 2018 17:43 )   PT: 12.9 sec;   INR: 1.19 ratio         PTT - ( 2018 17:43 )  PTT:24.9 sec              Urinalysis Basic - ( 2018 17:48 )    Color: Yellow / Appearance: Clear / S.028 / pH: x  Gluc: x / Ketone: Moderate  / Bili: Negative / Urobili: Negative   Blood: x / Protein: Trace / Nitrite: Negative   Leuk Esterase: Negative / RBC: x / WBC x   Sq Epi: x / Non Sq Epi: x / Bacteria: x                              15.8   16.1  )-----------( 607      ( 2018 17:43 )             48.1     CAPILLARY BLOOD GLUCOSE      POCT Blood Glucose.: 102 mg/dL (2018 02:22)    Blood Gas Source Venous: Venous ( @ 19:52)  Blood Gas Source Venous: Venous ( @ 17:43)  < from: CT Abdomen and Pelvis w/ IV Cont (18 @ 21:29) >    IMPRESSION: No significant interval change since 2018. Distal   esophageal mass, diffuse pulmonary, mediastinal, hilar and hepatic   metastatic disease, not significantly changed since 2018. Scattered   subcutaneous nodules in the right chest and ventral abdominal wall and   left adrenal nodule, suspicious for metastases.    < end of copied text >    < from: Xray Chest 1 View- PORTABLE-Urgent (18 @ 17:53) >    INTERPRETATION:  clear lungs  perihilar masses as seen on prior CT chest    < end of copied text >    EKG: Personally Reviewed labs, imaging, EKG by me:      139  |  93<L>  |  46<H>  ----------------------------<  200<H>  5.3   |  14<L>  |  0.95    Ca    10.7<H>      2018 17:43    TPro  7.8  /  Alb  3.8  /  TBili  1.2  /  DBili  x   /  AST  161<H>  /  ALT  170<H>  /  AlkPhos  343<H>        PT/INR - ( 2018 17:43 )   PT: 12.9 sec;   INR: 1.19 ratio         PTT - ( 2018 17:43 )  PTT:24.9 sec              Urinalysis Basic - ( 2018 17:48 )    Color: Yellow / Appearance: Clear / S.028 / pH: x  Gluc: x / Ketone: Moderate  / Bili: Negative / Urobili: Negative   Blood: x / Protein: Trace / Nitrite: Negative   Leuk Esterase: Negative / RBC: x / WBC x   Sq Epi: x / Non Sq Epi: x / Bacteria: x                              15.8   16.1  )-----------( 607      ( 2018 17:43 )             48.1     CAPILLARY BLOOD GLUCOSE      POCT Blood Glucose.: 102 mg/dL (2018 02:22)    Blood Gas Source Venous: Venous ( @ 19:52)  Blood Gas Source Venous: Venous ( @ 17:43)  < from: CT Abdomen and Pelvis w/ IV Cont (18 @ 21:29) >    IMPRESSION: No significant interval change since 2018. Distal   esophageal mass, diffuse pulmonary, mediastinal, hilar and hepatic   metastatic disease, not significantly changed since 2018. Scattered   subcutaneous nodules in the right chest and ventral abdominal wall and   left adrenal nodule, suspicious for metastases.    < end of copied text >    < from: Xray Chest 1 View- PORTABLE-Urgent (18 @ 17:53) >    INTERPRETATION:  clear lungs  perihilar masses as seen on prior CT chest    < end of copied text >    EKG: NSR, HR 97, no acute ST changes,

## 2018-02-20 NOTE — PROGRESS NOTE ADULT - PROBLEM SELECTOR PLAN 5
-Alk phos 343, ,   -most likely in setting of hepatic mets, will continue to monitor - In setting of malignancy  - Follow up nutritionist recs once tolerating PO.

## 2018-02-20 NOTE — CONSULT NOTE ADULT - ASSESSMENT
62 M w/ metastatic esophageal ca not yet on systemic therapy, progressive decreasing functional status, now with weight loss, dysphagia 2/2 tumor and FTT

## 2018-02-20 NOTE — PROGRESS NOTE ADULT - PROBLEM SELECTOR PLAN 8
-full liquid diet, ensure cans as patient can tolerate, cannot tolerate solid food  -PT consult  -currently: FULL CODE  -prophylactic measure: lovenox in setting of malignancy currently: FULL CODE AS PER PATIENT WISHES

## 2018-02-20 NOTE — H&P ADULT - PROBLEM SELECTOR PROBLEM 7
Failure to thrive in adult Type 2 diabetes mellitus without complication, without long-term current use of insulin

## 2018-02-20 NOTE — H&P ADULT - PROBLEM SELECTOR PLAN 8
-full liquid diet, ensure cans as patient can tolerate, cannot tolerate solid food  -PT consult  -currently: FULL CODE -full liquid diet, ensure cans as patient can tolerate, cannot tolerate solid food  -PT consult  -currently: FULL CODE  -prophylactic measure: lovenox in setting of malignancy

## 2018-02-20 NOTE — PROGRESS NOTE ADULT - PROBLEM SELECTOR PLAN 7
-ISS, currently sugar 200 -full liquid diet, ensure cans as patient can tolerate, cannot tolerate solid food  -PT consult  -currently: FULL CODE  -prophylactic measure: lovenox in setting of malignancy

## 2018-02-20 NOTE — PROGRESS NOTE ADULT - PROBLEM SELECTOR PLAN 6
-in setting of malignancy, will get nutritionist consult  -consider GI consult for esophageal stent - C/w ISS

## 2018-02-20 NOTE — H&P ADULT - ATTENDING COMMENTS
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 Patient has been not feeling well for ~1 month.  Diagnosed with metastatic disease early 2/2018, he notes that he has become more lethargic and increasingly anorexic since his diagnosis.  He notes that over the past 2 days he has been unable to eat anything but small bits of food due to odynophagia and anorexia. He was recently started on daily steroids for newly diagnosed brain mets, notes that finger sticks at home which he does twice daily were initially elevated to 200-300 (previously in the 100s), but over the past two days in setting of minimal POs, BGs have been back in the 100s and low 200s. He notes that he developed a non-productive cough soon after diagnosis of CA, thought to be related to metastatic disease, notes that cough has resolved with cough medicine that he was prescribed by PMD. He also notes nausea and vomiting which is not related to eating, notes 2 episodes of NBNB emesis over the past 2 days.  Denies fevers, chills, HA, vision changes, sore throat, SOB, chest pain, palpitations, diarrhea, constipation, rashes, dysuria.  On arrival here, patient initially tachy to 102, resolved to 80s with 3L NS, BP has been stable in 110-130/70 range, he has remained afebrile with TMax 98.2 and was satting well on RA.  Exam is notable for a gentleman in NAD, answers appropriately, though lethargic. PERRLA, OP clear, dry MM, no LAD, lungs clear, RRR, no MRG, abdomomen diffusely tender, though no rebound or guarding, no JVD, no edema, CN 2-12 intact, euthymic. Labs notable for leukocytosis to 16.1 with evidence of demargination, Hgb 15.8, Plt 607, INR 1.19, Na 139, K 5.3, Cl 93, patient anion gap metabolic acidosis bicarb 14, gap 32 on arrival, BUN 46, Cr 0.95, glucose 200 which down-trended to 100 on fluids, Ca 10.7, , ,  (all uptrending), beta hydroxybutyrate 3, lactate initially 10.9, improved to 4.8, UA with moderate ketones, >1000 glucose, SG 1.028. CT A/P shows diffuse mets, unchanged from prior imaging.      A/P  This is a 62 year old gentleman with history of recent TIA without focal deficits currently with implanted loop recorder, WPW s/p ablation (7/2016), NIDDM2 and newly diagnosed esophageal adenocarcinoma with diffuse mets to liver, lungs and brain mets presenting with poor POs, lethargy, found to have multiple metabolic derangements including leukocytosis with evidence of demargination, thrombocytosis, anion gap metabolic acidosis with evidence of mixed keotacidosis and lactic acidosis, and hypercalcemia.     #Anion gap metabolic acidosis- mixed lactic acidosis and ketoacidosis.  Suspect that lactic acidosis is related to dehydration and malignancy.  Patient did meet SIRS criteria on arrival, but no localizing signs of infection and infectious w/u negative, suspect that tachycardia was 2/2 intravascular depletion given resolution with fluids alone and leukocytosis is 2/2 steroids use (as evidenced by demargination), hemoconcentration (all cell lines significantly up), and possibly some reactive to malignancy.  As such, will hold antibiotics and trend exam and vitals closely. Suspect that ketoacidosis is starvation given dehydration (supported by BUN:Cr, SG), and patient history. Less likely diabetic given only slightly elevated blood glucose and significant improvement without insulin (BG currently 100). Plan for repeat set of labs now including CMP to trend gap, acidosis, repeat BHB, repeat VBG. Will transition to D5 1/2 NS given likely starvation, with q12 BMP and electrolytes given risk of refeeding.     #Esophageal CA- patient presented [place] of [place] [facility] [place], was advised that [facility] did not have any alternative therapies and that he should seek his CA care at [facility]. Nutrition c/s. Onc c/s. Full liquid diet.  Lovenox PPx. Continue decadron.      #Dehydration- patient with extremely poor POs, likely 2/2 obstructive mass and malignancy.  This is likely to worsen in the setting of chemotherapy, should address alternative modes of nutrition. For now, continue fluids as above.    #NIDDM- ISS, A1c. 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 Patient has been not feeling well for ~1 month.  Diagnosed with metastatic disease early 2/2018, he notes that he has become more lethargic and increasingly anorexic since his diagnosis.  He notes that over the past 2 days he has been unable to eat anything but small bits of food due to odynophagia and anorexia. He was recently started on daily steroids for newly diagnosed brain mets, notes that finger sticks at home which he does twice daily were initially elevated to 200-300 (previously in the 100s), but over the past two days in setting of minimal POs, BGs have been back in the 100s and low 200s. He notes that he developed a non-productive cough soon after diagnosis of CA, thought to be related to metastatic disease, notes that cough has resolved with cough medicine that he was prescribed by PMD. He also notes nausea and vomiting which is not related to eating, notes 2 episodes of NBNB emesis over the past 2 days.  Denies fevers, chills, HA, vision changes, sore throat, SOB, chest pain, palpitations, diarrhea, constipation, rashes, dysuria.  On arrival here, patient initially tachy to 102, resolved to 80s with 3L NS, BP has been stable in 110-130/70 range, he has remained afebrile with TMax 98.2 and was satting well on RA.  Exam is notable for a gentleman in NAD, answers appropriately, though lethargic. PERRLA, OP clear, dry MM, no LAD, lungs clear, RRR, no MRG, abdomomen diffusely tender, though no rebound or guarding, no JVD, no edema, CN 2-12 intact, euthymic. Labs notable for leukocytosis to 16.1 with evidence of demargination, Hgb 15.8, Plt 607, INR 1.19, Na 139, K 5.3, Cl 93, patient anion gap metabolic acidosis bicarb 14, gap 32 on arrival, BUN 46, Cr 0.95, glucose 200 which down-trended to 100 on fluids, Ca 10.7, , ,  (all uptrending), beta hydroxybutyrate 3, lactate initially 10.9, improved to 4.8, UA with moderate ketones, >1000 glucose, SG 1.028. CT A/P shows diffuse mets, unchanged from prior imaging.  CXR with stable perihilar masses, no focal consolidation.      A/P  This is a 62 year old gentleman with history of recent TIA without focal deficits currently with implanted loop recorder, WPW s/p ablation (7/2016), NIDDM2 and newly diagnosed esophageal adenocarcinoma with diffuse mets to liver, lungs and brain mets presenting with poor POs, lethargy, found to have multiple metabolic derangements including leukocytosis with evidence of demargination, thrombocytosis, anion gap metabolic acidosis with evidence of mixed keotacidosis and lactic acidosis, and hypercalcemia.     #Anion gap metabolic acidosis- mixed lactic acidosis and ketoacidosis.  Suspect that lactic acidosis is related to dehydration and malignancy.  Patient did meet SIRS criteria on arrival, but no localizing signs of infection and infectious w/u negative, suspect that tachycardia was 2/2 intravascular depletion given resolution with fluids alone and leukocytosis is 2/2 steroids use (as evidenced by demargination), hemoconcentration (all cell lines significantly up), and possibly some reactive to malignancy.  As such, will hold antibiotics and trend exam and vitals closely. Suspect that ketoacidosis is starvation given dehydration (supported by BUN:Cr, SG), and patient history. Less likely diabetic given only slightly elevated blood glucose and significant improvement without insulin (BG currently 100). Plan for repeat set of labs now including CMP to trend gap, acidosis, repeat BHB, repeat VBG. Will transition to D5 1/2 NS given likely starvation, with q12 BMP and electrolytes given risk of refeeding.     #Esophageal CA- [place] [facility] [place], was advised that [facility] [place] any alternative therapies and that he should seek his CA care at [facility]. Nutrition c/s. Onc c/s. Full liquid diet.  Lovenox PPx. Continue decadron.      #Dehydration- patient with extremely poor POs, likely 2/2 obstructive mass and malignancy.  This is likely to worsen in the setting of chemotherapy, should address alternative modes of nutrition. For now, continue fluids as above.    #Cough- likely 2/2 malignancy. Hycodan.      #NIDDM- ISS, A1c.

## 2018-02-20 NOTE — CONSULT NOTE ADULT - SUBJECTIVE AND OBJECTIVE BOX
62M h/o newly diagnosed metastatic poorly differentiated adenocarcinoma of the esophagus with extensive liver, pulm, brain mets, not on chemotherapy yet, presenting w/ c/o of progressive weakness, N/V, and loss of appetite x 2 days. As per patient, has been feeling weak, and has had two episodes of NBNB vomiting, w/ chronic abdominal pain which has been going on since his diagnosis from recent admission. Also states, he is feeling SOB for the last two days upon exertion. No c/o of CP, palpitations, fevers/chills, uri symptoms, diaphoresis, diarrhea, constipation, dysuria, incontinence. All other ROS is negative. As per wife, patient is not eating. She tries to give him soup, but he has not been tolerating it due to loss of appetite, and has been losing 1-2 lbs/day.    Of note, patient had recent admission 2/6-2/8 where he was newly diagnosed metastatic w/ poorly differentiated adenocarcinoma s/p EGD w/ biopsy. He saw Dr. Ansari (oncologist) outpatient and recommended starting chemotherapy as per wife. Oncologist referred to radiation oncologist, Dr. Brooks who suggested radiation and Gamma Knife. Patient and wife also got second opinion from [facility] day of admission who recommended starting w/ chemotherapy first and possible esophageal stent placement. Patient and wife have not made further decisions regarding treatment yet.     Also of note, patient had TIA 1/19/2018, and was followed by neurologist, and currently has loop recorder placed as per neurologist.    ED vitals: temp 97.6, -->84, /80, RR 20, 96% RA-->2L NC  In the ED: given 3L NS (20 Feb 2018 03:03)       ROS: as above       PAST MEDICAL & SURGICAL HISTORY:  Esophageal cancer  TIA (transient ischemic attack)  Diabetes  H/O prior ablation treatment: for wpw  Status post placement of implantable loop recorder      SOCIAL HISTORY:    FAMILY HISTORY:  Family history of hypertension (Father)      MEDICATIONS  (STANDING):  dexamethasone     Tablet 4 milliGRAM(s) Oral two times a day  dextrose 5% + sodium chloride 0.45%. 1000 milliLiter(s) (75 mL/Hr) IV Continuous <Continuous>  dextrose 5%. 1000 milliLiter(s) (50 mL/Hr) IV Continuous <Continuous>  dextrose 50% Injectable 12.5 Gram(s) IV Push once  dextrose 50% Injectable 25 Gram(s) IV Push once  dextrose 50% Injectable 25 Gram(s) IV Push once  enoxaparin Injectable 40 milliGRAM(s) SubCutaneous daily  insulin lispro (HumaLOG) corrective regimen sliding scale   SubCutaneous three times a day before meals  insulin lispro (HumaLOG) corrective regimen sliding scale   SubCutaneous at bedtime  pantoprazole    Tablet 40 milliGRAM(s) Oral before breakfast    MEDICATIONS  (PRN):  dextrose Gel 1 Dose(s) Oral once PRN Blood Glucose LESS THAN 70 milliGRAM(s)/deciliter  glucagon  Injectable 1 milliGRAM(s) IntraMuscular once PRN Glucose LESS THAN 70 milligrams/deciliter  morphine   Solution 4 milliGRAM(s) Oral every 6 hours PRN Severe Pain (7 - 10)  morphine   Solution 2 milliGRAM(s) Oral every 6 hours PRN Moderate Pain (4 - 6)  ondansetron Injectable 4 milliGRAM(s) IV Push every 6 hours PRN Nausea and/or Vomiting      Allergies    No Known Allergies    Intolerances        Vital Signs Last 24 Hrs  T(C): 36.7 (20 Feb 2018 08:31), Max: 36.8 (19 Feb 2018 19:20)  T(F): 98 (20 Feb 2018 08:31), Max: 98.2 (19 Feb 2018 19:20)  HR: 102 (20 Feb 2018 08:31) (84 - 107)  BP: 121/72 (20 Feb 2018 08:31) (111/66 - 136/85)  BP(mean): --  RR: 19 (20 Feb 2018 08:31) (17 - 20)  SpO2: 91% (20 Feb 2018 08:31) (91% - 99%)    PHYSICAL EXAM  General: adult in NAD  HEENT: clear oropharynx, anicteric sclera, pink conjunctiva  Neck: supple  CV: normal S1/S2 with no murmur rubs or gallops  Lungs: positive air movement b/l ant lungs,clear to auscultation, no wheezes, no rales  Abdomen: soft non-tender non-distended, no hepatosplenomegaly  Ext: no clubbing cyanosis or edema  Skin: no rashes and no petechiae  Neuro: alert and oriented X 4, no focal deficits      LABS:                          13.9   13.8  )-----------( 466      ( 20 Feb 2018 06:32 )             41.1         Mean Cell Volume : 83.6 fl  Mean Cell Hemoglobin : 28.4 pg  Mean Cell Hemoglobin Concentration : 33.9 gm/dL  Auto Neutrophil # : 11.2 K/uL  Auto Lymphocyte # : 1.2 K/uL  Auto Monocyte # : 1.2 K/uL  Auto Eosinophil # : 0.0 K/uL  Auto Basophil # : 0.1 K/uL  Auto Neutrophil % : 81.5 %  Auto Lymphocyte % : 9.0 %  Auto Monocyte % : 8.9 %  Auto Eosinophil % : 0.2 %  Auto Basophil % : 0.4 %      Serial CBC's  02-20 @ 06:32  Hct-41.1 / Hgb-13.9 / Plat-466 / RBC-4.92 / WBC-13.8  Serial CBC's  02-19 @ 17:43  Hct-48.1 / Hgb-15.8 / Plat-607 / RBC-5.70 / WBC-16.1      02-20    139  |  101  |  30<H>  ----------------------------<  97  4.6   |  21<L>  |  0.70    Ca    8.9      20 Feb 2018 06:35  Phos  2.6     02-20  Mg     2.3     02-20    TPro  6.6  /  Alb  3.1<L>  /  TBili  1.3<H>  /  DBili  x   /  AST  175<H>  /  ALT  135<H>  /  AlkPhos  352<H>  02-20      PT/INR - ( 19 Feb 2018 17:43 )   PT: 12.9 sec;   INR: 1.19 ratio         PTT - ( 19 Feb 2018 17:43 )  PTT:24.9 sec                RADIOLOGY & ADDITIONAL STUDIES: 62M h/o newly diagnosed metastatic poorly differentiated adenocarcinoma of the esophagus with extensive liver, pulm, brain mets, not on chemotherapy yet, presenting w/ c/o of progressive weakness, N/V, and progressive loss of appetite over several weeks. In addition he has been having increasing dysphagia. As per patient, has been feeling weak, and has had two episodes of NBNB vomiting, w/ chronic abdominal pain which has been going on since his diagnosis from recent admission. + early satiety. Also states, he is feeling SOB for the last two days upon exertion. No c/o of CP, palpitations, fevers/chills, uri symptoms, diaphoresis, diarrhea, constipation, dysuria, incontinence. Pt needs assistance with ambulating and ADLs. Feels unsteady, lightheaded occasionally with ambulation.     Of note, patient had recent admission 2/6-2/8 where he was newly diagnosed metastatic w/ poorly differentiated adenocarcinoma s/p EGD w/ biopsy. He saw Dr. Ansari, oncology and palliative chemo was discussed. Also was seen by radiation oncologist, Dr. Brooks who suggested radiation and Gamma Knife. Patient and wife also obtained second opinion from [facility] day of admission who recommended starting w/ chemotherapy first and possible esophageal stent placement.   Also of note, patient had TIA 1/19/2018, and was followed by neurologist, and currently has loop recorder placed as per neurologist.           ROS: as above       PAST MEDICAL & SURGICAL HISTORY:  Esophageal cancer  TIA (transient ischemic attack)  Diabetes  H/O prior ablation treatment: for wpw  Status post placement of implantable loop recorder      SOCIAL HISTORY: no tobacco, no alcohol use. Was working up until 3 weeks ago in [place] (worked with [employer])    FAMILY HISTORY:  Family history of hypertension (Father)      MEDICATIONS  (STANDING):  dexamethasone     Tablet 4 milliGRAM(s) Oral two times a day  dextrose 5% + sodium chloride 0.45%. 1000 milliLiter(s) (75 mL/Hr) IV Continuous <Continuous>  dextrose 5%. 1000 milliLiter(s) (50 mL/Hr) IV Continuous <Continuous>  dextrose 50% Injectable 12.5 Gram(s) IV Push once  dextrose 50% Injectable 25 Gram(s) IV Push once  dextrose 50% Injectable 25 Gram(s) IV Push once  enoxaparin Injectable 40 milliGRAM(s) SubCutaneous daily  insulin lispro (HumaLOG) corrective regimen sliding scale   SubCutaneous three times a day before meals  insulin lispro (HumaLOG) corrective regimen sliding scale   SubCutaneous at bedtime  pantoprazole    Tablet 40 milliGRAM(s) Oral before breakfast    MEDICATIONS  (PRN):  dextrose Gel 1 Dose(s) Oral once PRN Blood Glucose LESS THAN 70 milliGRAM(s)/deciliter  glucagon  Injectable 1 milliGRAM(s) IntraMuscular once PRN Glucose LESS THAN 70 milligrams/deciliter  morphine   Solution 4 milliGRAM(s) Oral every 6 hours PRN Severe Pain (7 - 10)  morphine   Solution 2 milliGRAM(s) Oral every 6 hours PRN Moderate Pain (4 - 6)  ondansetron Injectable 4 milliGRAM(s) IV Push every 6 hours PRN Nausea and/or Vomiting      Allergies    No Known Allergies    Intolerances        Vital Signs Last 24 Hrs  T(C): 36.7 (20 Feb 2018 08:31), Max: 36.8 (19 Feb 2018 19:20)  T(F): 98 (20 Feb 2018 08:31), Max: 98.2 (19 Feb 2018 19:20)  HR: 102 (20 Feb 2018 08:31) (84 - 107)  BP: 121/72 (20 Feb 2018 08:31) (111/66 - 136/85)  BP(mean): --  RR: 19 (20 Feb 2018 08:31) (17 - 20)  SpO2: 91% (20 Feb 2018 08:31) (91% - 99%)    PHYSICAL EXAM  General: adult in NAD, chronically ill appearing, cachectic  HEENT: dry mucous membranes   Neck: supple  CV: normal S1/S2 with no murmur rubs or gallops  Lungs: positive air movement b/l ant lungs,clear to auscultation, no wheezes, no rales  Abdomen: soft non-tender non-distended, no hepatosplenomegaly  Ext: no clubbing cyanosis or edema  Skin: no rashes and no petechiae  Neuro: alert and oriented X 4, no focal deficits      LABS:                          13.9   13.8  )-----------( 466      ( 20 Feb 2018 06:32 )             41.1         Mean Cell Volume : 83.6 fl  Mean Cell Hemoglobin : 28.4 pg  Mean Cell Hemoglobin Concentration : 33.9 gm/dL  Auto Neutrophil # : 11.2 K/uL  Auto Lymphocyte # : 1.2 K/uL  Auto Monocyte # : 1.2 K/uL  Auto Eosinophil # : 0.0 K/uL  Auto Basophil # : 0.1 K/uL  Auto Neutrophil % : 81.5 %  Auto Lymphocyte % : 9.0 %  Auto Monocyte % : 8.9 %  Auto Eosinophil % : 0.2 %  Auto Basophil % : 0.4 %      Serial CBC's  02-20 @ 06:32  Hct-41.1 / Hgb-13.9 / Plat-466 / RBC-4.92 / WBC-13.8  Serial CBC's  02-19 @ 17:43  Hct-48.1 / Hgb-15.8 / Plat-607 / RBC-5.70 / WBC-16.1      02-20    139  |  101  |  30<H>  ----------------------------<  97  4.6   |  21<L>  |  0.70    Ca    8.9      20 Feb 2018 06:35  Phos  2.6     02-20  Mg     2.3     02-20    TPro  6.6  /  Alb  3.1<L>  /  TBili  1.3<H>  /  DBili  x   /  AST  175<H>  /  ALT  135<H>  /  AlkPhos  352<H>  02-20      PT/INR - ( 19 Feb 2018 17:43 )   PT: 12.9 sec;   INR: 1.19 ratio         PTT - ( 19 Feb 2018 17:43 )  PTT:24.9 sec                RADIOLOGY & ADDITIONAL STUDIES:

## 2018-02-20 NOTE — CONSULT NOTE ADULT - ATTENDING COMMENTS
As above  Patient with dysphagia/malnutrition from metastatic esophageal cancer.  Admitted with weakness and severe lactic acidosis/dehydration.  Patient has not been started on chemotherapy or other treatment for cancer.  Consulted for consideration of esophageal stent or PEG.    Agree that patient does need nutritional assistance.  Usually oncology and surgical oncology reserve esophageal stenting for palliative settings when oncologic treatment have not had significant benefits.  IR placed gastrostomy tube may be the procedure of choice at this time for this patient.  Will discuss with interventional radiology and medical oncology.
[phone]

## 2018-02-20 NOTE — CONSULT NOTE ADULT - ASSESSMENT
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 He p/w reduced oral intake and appetite.    Impression:  1) Stage IV Esophageal Adenocarcinoma  2) Elevated liver enzymes - likely due to metastatic liver disease  3) Lactic Acidosis with Leukocytosis    Plan:  - F/u blood cultures and urine culture  - Correct electrolyte and metabolic abnormalities  - Will discuss option of esophageal stent with team and patient/family 63 y/o M with hx of TIA (1/19/2018), WPW s/p ablation (7/2016), DM II, BPH, Stage IV Adenocarcinoma of the Esophagus (dx 2/6/2018) c/b metastases to the liver, lungs and brain. He p/w reduced oral intake and appetite.    Impression:  1) Stage IV Esophageal Adenocarcinoma - with dysphagia.  2) Elevated liver enzymes - likely due to metastatic liver disease  3) Lactic Acidosis with Leukocytosis    Plan:  - F/u blood cultures and urine culture  - Correct electrolyte and metabolic abnormalities  - Will discuss feeding options including PEG placement if needed for improving nutritional status for chemotherapy  - Will discuss option of esophageal stent with Oncology and patient/family

## 2018-02-20 NOTE — H&P ADULT - NSHPREVIEWOFSYSTEMS_GEN_ALL_CORE
REVIEW OF SYSTEMS:  CONSTITUTIONAL: No fever, weight loss, or fatigue  EYES: No eye pain, visual disturbances, or discharge  ENMT:  No difficulty hearing, tinnitus, vertigo; No sinus or throat pain  NECK: No pain or stiffness  BREASTS: No pain, masses, or nipple discharge  RESPIRATORY: No cough, wheezing, chills or hemoptysis; No shortness of breath  CARDIOVASCULAR: No chest pain, palpitations, dizziness, or leg swelling  GASTROINTESTINAL: No abdominal or epigastric pain. No nausea, vomiting, or hematemesis; No diarrhea or constipation. No melena or hematochezia.  GENITOURINARY: No dysuria, frequency, hematuria, or incontinence  NEUROLOGICAL: No headaches, memory loss, loss of strength, numbness, or tremors  SKIN: No itching, burning, rashes, or lesions   LYMPH NODES: No enlarged glands  ENDOCRINE: No heat or cold intolerance; No hair loss  MUSCULOSKELETAL: No joint pain or swelling; No muscle, back, or extremity pain  PSYCHIATRIC: No depression, anxiety, mood swings, or difficulty sleeping  HEME/LYMPH: No easy bruising, or bleeding gums  ALLERY AND IMMUNOLOGIC: No hives or eczema REVIEW OF SYSTEMS:  CONSTITUTIONAL: No fever  EYES: No eye pain, visual disturbances, or discharge  ENMT:  No difficulty hearing, tinnitus, vertigo; No sinus or throat pain  NECK: No pain or stiffness  RESPIRATORY: No cough, wheezing, chills or hemoptysis  CARDIOVASCULAR: No chest pain, palpitations, dizziness, or leg swelling  GASTROINTESTINAL: No hematemesis; No diarrhea or constipation. No melena or hematochezia.  GENITOURINARY: No dysuria, frequency, hematuria, or incontinence  NEUROLOGICAL: No headaches, numbness, or tremors  SKIN: No itching, burning, rashes, or lesions   LYMPH NODES: No enlarged glands  MUSCULOSKELETAL: No joint pain or swelling; No muscle, back, or extremity pain  HEME/LYMPH: No easy bruising, or bleeding gums  ALLERY AND IMMUNOLOGIC: No hives or eczema

## 2018-02-20 NOTE — PROGRESS NOTE ADULT - ASSESSMENT
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), DM2, BPH, newly diagnosed metastatic poorly differentiated adenocarcinoma of the esophagus with extensive liver, pulm, brain metss, presenting w/ c/o of N/V, weakness, found to be in SIRS,  Anion-Gap Metabolic Acidosis most likely in setting of malignancy and starvation ketosis

## 2018-02-20 NOTE — H&P ADULT - NSHPSOCIALHISTORY_GEN_ALL_CORE
Social History:    Marital Status:  (X)    (   ) Single    (   )    (  )   Occupation:  in building across from [place] on 9/11, outdoors during evacuation with significant inhalant exposure, and returned to area in 1 year  Lives with: (  ) alone  (  ) children   (X) spouse   (  ) parents  (  ) other    Substance Use (street drugs): (X) never used  (  ) other:  Tobacco Usage:  (   ) never smoked   (X) former smoker   (   ) current smoker  (     ) pack year  (        ) last cigarette date  Alcohol Usage: none

## 2018-02-20 NOTE — H&P ADULT - PROBLEM SELECTOR PLAN 1
-increased lactate 10.7 on admission, now s/p 3L fluids, w/ improved lactate to 4.8  -most likely in setting of Type B lactic acidosis in setting of malignancy vs possible infection as patient meets SIRS criteria w/ increased lactate, leukocytosis, and tachycardia, unclear source  -s/p 3L fluids, continues w/ NS @ 75cc's/hr, will give zosyn as patient in immunocompromised state, if blood cultures negative, can discontinue, U/A-negative for LE/nitrites, will follow up urine culture  -CTAP-no acute infectious source identified, CXR-no signs of pna  -CBC w/ diff daily, CMP -increased lactate 10.7 on admission, now s/p 3L fluids, w/ improved lactate to 4.8  -most likely in setting of Type B lactic acidosis in setting of malignancy vs possible infection as patient meets SIRS criteria w/ increased lactate, leukocytosis, and tachycardia, unclear source  -s/p 3L fluids, continues w/ NS @ 75cc's/hr, will give zosyn as patient in immunocompromised state, if blood cultures negative, can discontinue, U/A-negative for LE/nitrites, will follow up urine culture  -CTAP-no acute infectious source identified, CXR-no signs of pna  -leukocytosis may also be in setting of steroids  -CBC w/ diff daily, CMP -increased lactate 10.7 on admission, now s/p 3L fluids, w/ improved lactate to 4.8  -most likely in setting of Type B lactic acidosis in setting of malignancy vs less likely possible infection as patient meets SIRS criteria w/ increased lactate, leukocytosis, and tachycardia, unclear source  -s/p 3L fluids, continues w/ NS @ 75cc's/hr, U/A-negative for LE/nitrites, will follow up urine culture and blood cultures, will hold off on abx for now as no clear source identified and patient clinically improving w/ fluids  -CTAP-no acute infectious source identified, CXR-no signs of pna  -leukocytosis may also be in setting of steroids vs reactive leukocytosis vs hemoconcentrated  -CBC w/ diff daily, CMP

## 2018-02-20 NOTE — PROGRESS NOTE ADULT - SUBJECTIVE AND OBJECTIVE BOX
Patient is a 62y old  Male who presents with a chief complaint of Weakness, N/V, (2018 03:03)    SUBJECTIVE / OVERNIGHT EVENTS:  No acute overnight events. Patient endorsed vomiting after drinking water. Patient also endorsed sob, abdominal pain and tenderness to the epigastric area, 6/10, radiating to the back. Patient denied fever, chills, chest pain, palpitations, diarrhea, constipation, nausea.     MEDICATIONS  (STANDING):  acetaminophen  IVPB. 1000 milliGRAM(s) IV Intermittent once  dexamethasone     Tablet 4 milliGRAM(s) Oral two times a day  dextrose 5% + sodium chloride 0.45%. 1000 milliLiter(s) (75 mL/Hr) IV Continuous <Continuous>  dextrose 5%. 1000 milliLiter(s) (50 mL/Hr) IV Continuous <Continuous>  dextrose 50% Injectable 12.5 Gram(s) IV Push once  dextrose 50% Injectable 25 Gram(s) IV Push once  dextrose 50% Injectable 25 Gram(s) IV Push once  enoxaparin Injectable 40 milliGRAM(s) SubCutaneous daily  insulin lispro (HumaLOG) corrective regimen sliding scale   SubCutaneous three times a day before meals  insulin lispro (HumaLOG) corrective regimen sliding scale   SubCutaneous at bedtime  pantoprazole    Tablet 40 milliGRAM(s) Oral before breakfast    MEDICATIONS  (PRN):  dextrose Gel 1 Dose(s) Oral once PRN Blood Glucose LESS THAN 70 milliGRAM(s)/deciliter  glucagon  Injectable 1 milliGRAM(s) IntraMuscular once PRN Glucose LESS THAN 70 milligrams/deciliter  ondansetron Injectable 4 milliGRAM(s) IV Push every 6 hours PRN Nausea and/or Vomiting    Vital Signs Last 24 Hrs  T(C): 36.7 (2018 08:31), Max: 36.8 (2018 19:20)  T(F): 98 (2018 08:31), Max: 98.2 (2018 19:20)  HR: 102 (2018 08:31) (84 - 107)  BP: 121/72 (2018 08:31) (111/66 - 136/85)  RR: 19 (2018 08:31) (17 - 20)  SpO2: 91% (2018 08:31) (91% - 99%)  CAPILLARY BLOOD GLUCOSE    POCT Blood Glucose.: 111 mg/dL (2018 08:58)  POCT Blood Glucose.: 102 mg/dL (2018 02:22)    I&O's Summary    PHYSICAL EXAM:  GENERAL: NAD, well-developed  HEAD:  Atraumatic, Normocephalic  EYES: EOMI, conjunctiva and sclera clear  NECK: Supple, No JVD  CHEST/LUNG: Clear to auscultation bilaterally; No wheeze  HEART: Regular rate and rhythm; Normal S1 S2, No murmurs, rubs, or gallops  ABDOMEN: Soft,, Nondistended; Hyperactive bowel sounds. Tenderness to the epigastric area. No palpable mas..   EXTREMITIES:  2+ Peripheral Pulses, No clubbing, cyanosis, or edema  PSYCH: AAOx3  NEUROLOGY: non-focal  SKIN: No rashes or lesions    LABS:                        13.9   13.8  )-----------( 466      ( 2018 06:32 )             41.1     02-    139  |  101  |  30<H>  ----------------------------<  97  4.6   |  21<L>  |  0.70    Ca    8.9      2018 06:35  Phos  2.6     -  Mg     2.3         TPro  6.6  /  Alb  3.1<L>  /  TBili  1.3<H>  /  DBili  x   /  AST  175<H>  /  ALT  135<H>  /  AlkPhos  352<H>  -20  PT/INR - ( 2018 17:43 )   PT: 12.9 sec;   INR: 1.19 ratio    PTT - ( 2018 17:43 )  PTT:24.9 sec    Urinalysis Basic - ( 2018 17:48 )    Color: Yellow / Appearance: Clear / S.028 / pH: x  Gluc: x / Ketone: Moderate  / Bili: Negative / Urobili: Negative   Blood: x / Protein: Trace / Nitrite: Negative   Leuk Esterase: Negative / RBC: x / WBC x   Sq Epi: x / Non Sq Epi: x / Bacteria: x        RADIOLOGY & ADDITIONAL TESTS:    Imaging Personally Reviewed:    Consultant(s) Notes Reviewed:      Care Discussed with Consultants/Other Providers: Patient is a 62y old  Male who presents with a chief complaint of Weakness, N/V, (2018 03:03)    SUBJECTIVE / OVERNIGHT EVENTS:  No acute overnight events. Patient endorsed vomiting after drinking water. Patient also endorsed sob, abdominal pain and tenderness to the epigastric area, 6/10, radiating to the back. Patient denied fever, chills, chest pain, palpitations, diarrhea, constipation, nausea.     MEDICATIONS  (STANDING):  acetaminophen  IVPB. 1000 milliGRAM(s) IV Intermittent once  dexamethasone     Tablet 4 milliGRAM(s) Oral two times a day  dextrose 5% + sodium chloride 0.45%. 1000 milliLiter(s) (75 mL/Hr) IV Continuous <Continuous>  dextrose 5%. 1000 milliLiter(s) (50 mL/Hr) IV Continuous <Continuous>  dextrose 50% Injectable 12.5 Gram(s) IV Push once  dextrose 50% Injectable 25 Gram(s) IV Push once  dextrose 50% Injectable 25 Gram(s) IV Push once  enoxaparin Injectable 40 milliGRAM(s) SubCutaneous daily  insulin lispro (HumaLOG) corrective regimen sliding scale   SubCutaneous three times a day before meals  insulin lispro (HumaLOG) corrective regimen sliding scale   SubCutaneous at bedtime  pantoprazole    Tablet 40 milliGRAM(s) Oral before breakfast    MEDICATIONS  (PRN):  dextrose Gel 1 Dose(s) Oral once PRN Blood Glucose LESS THAN 70 milliGRAM(s)/deciliter  glucagon  Injectable 1 milliGRAM(s) IntraMuscular once PRN Glucose LESS THAN 70 milligrams/deciliter  ondansetron Injectable 4 milliGRAM(s) IV Push every 6 hours PRN Nausea and/or Vomiting    Vital Signs Last 24 Hrs  T(C): 36.7 (2018 08:31), Max: 36.8 (2018 19:20)  T(F): 98 (2018 08:31), Max: 98.2 (2018 19:20)  HR: 102 (2018 08:31) (84 - 107)  BP: 121/72 (2018 08:31) (111/66 - 136/85)  RR: 19 (2018 08:31) (17 - 20)  SpO2: 91% (2018 08:31) (91% - 99%)  CAPILLARY BLOOD GLUCOSE    POCT Blood Glucose.: 111 mg/dL (2018 08:58)  POCT Blood Glucose.: 102 mg/dL (2018 02:22)    I&O's Summary    PHYSICAL EXAM:  GENERAL: NAD, well-developed  HEAD:  Atraumatic, Normocephalic  EYES: EOMI, conjunctiva and sclera clear  NECK: Supple, No JVD  CHEST/LUNG: Clear to auscultation bilaterally; No wheeze  HEART: Regular rate and rhythm; Normal S1 S2, No murmurs, rubs, or gallops  ABDOMEN: Soft,, Nondistended; Hyperactive bowel sounds. Tenderness to the epigastric area. No palpable mas..   EXTREMITIES:  2+ Peripheral Pulses, No clubbing, cyanosis, or edema  PSYCH: AAOx3  NEUROLOGY: non-focal  SKIN: No rashes or lesions    LABS:             13.9   13.8  )-----------( 466      ( 2018 06:32 )             41.1     02-    139  |  101  |  30<H>  ----------------------------<  97  4.6   |  21<L>  |  0.70    Ca    8.9      2018 06:35  Phos  2.6     -  Mg     2.3         TPro  6.6  /  Alb  3.1<L>  /  TBili  1.3<H>  /  DBili  x   /  AST  175<H>  /  ALT  135<H>  /  AlkPhos  352<H>  -20  PT/INR - ( 2018 17:43 )   PT: 12.9 sec;   INR: 1.19 ratio    PTT - ( 2018 17:43 )  PTT:24.9 sec    Urinalysis Basic - ( 2018 17:48 )    Color: Yellow / Appearance: Clear / S.028 / pH: x  Gluc: x / Ketone: Moderate  / Bili: Negative / Urobili: Negative   Blood: x / Protein: Trace / Nitrite: Negative   Leuk Esterase: Negative / RBC: x / WBC x   Sq Epi: x / Non Sq Epi: x / Bacteria: x      RADIOLOGY & ADDITIONAL TESTS:    Imaging Personally Reviewed:    Consultant(s) Notes Reviewed:  Gastro, Oncology    Care Discussed with Consultants/Other Providers:

## 2018-02-21 DIAGNOSIS — E86.0 DEHYDRATION: ICD-10-CM

## 2018-02-21 DIAGNOSIS — Z51.5 ENCOUNTER FOR PALLIATIVE CARE: ICD-10-CM

## 2018-02-21 DIAGNOSIS — R63.0 ANOREXIA: ICD-10-CM

## 2018-02-21 LAB
ALBUMIN SERPL ELPH-MCNC: 2.6 G/DL — LOW (ref 3.3–5)
ALP SERPL-CCNC: 297 U/L — HIGH (ref 40–120)
ALT FLD-CCNC: 132 U/L — HIGH (ref 10–45)
ANION GAP SERPL CALC-SCNC: 20 MMOL/L — HIGH (ref 5–17)
AST SERPL-CCNC: 157 U/L — HIGH (ref 10–40)
BASOPHILS # BLD AUTO: 0 K/UL — SIGNIFICANT CHANGE UP (ref 0–0.2)
BASOPHILS NFR BLD AUTO: 0 % — SIGNIFICANT CHANGE UP (ref 0–2)
BILIRUB SERPL-MCNC: 1.2 MG/DL — SIGNIFICANT CHANGE UP (ref 0.2–1.2)
BUN SERPL-MCNC: 20 MG/DL — SIGNIFICANT CHANGE UP (ref 7–23)
CALCIUM SERPL-MCNC: 8.6 MG/DL — SIGNIFICANT CHANGE UP (ref 8.4–10.5)
CHLORIDE SERPL-SCNC: 97 MMOL/L — SIGNIFICANT CHANGE UP (ref 96–108)
CO2 SERPL-SCNC: 20 MMOL/L — LOW (ref 22–31)
CREAT SERPL-MCNC: 0.55 MG/DL — SIGNIFICANT CHANGE UP (ref 0.5–1.3)
EOSINOPHIL # BLD AUTO: 0 K/UL — SIGNIFICANT CHANGE UP (ref 0–0.5)
EOSINOPHIL NFR BLD AUTO: 0 % — SIGNIFICANT CHANGE UP (ref 0–6)
GLUCOSE SERPL-MCNC: 158 MG/DL — HIGH (ref 70–99)
HCT VFR BLD CALC: 39.1 % — SIGNIFICANT CHANGE UP (ref 39–50)
HGB BLD-MCNC: 13 G/DL — SIGNIFICANT CHANGE UP (ref 13–17)
IMM GRANULOCYTES NFR BLD AUTO: 0.7 % — SIGNIFICANT CHANGE UP (ref 0–1.5)
LYMPHOCYTES # BLD AUTO: 0.85 K/UL — LOW (ref 1–3.3)
LYMPHOCYTES # BLD AUTO: 8.5 % — LOW (ref 13–44)
MAGNESIUM SERPL-MCNC: 2.3 MG/DL — SIGNIFICANT CHANGE UP (ref 1.6–2.6)
MCHC RBC-ENTMCNC: 27 PG — SIGNIFICANT CHANGE UP (ref 27–34)
MCHC RBC-ENTMCNC: 33.2 GM/DL — SIGNIFICANT CHANGE UP (ref 32–36)
MCV RBC AUTO: 81.3 FL — SIGNIFICANT CHANGE UP (ref 80–100)
MONOCYTES # BLD AUTO: 1.01 K/UL — HIGH (ref 0–0.9)
MONOCYTES NFR BLD AUTO: 10 % — SIGNIFICANT CHANGE UP (ref 2–14)
NEUTROPHILS # BLD AUTO: 8.12 K/UL — HIGH (ref 1.8–7.4)
NEUTROPHILS NFR BLD AUTO: 80.8 % — HIGH (ref 43–77)
PHOSPHATE SERPL-MCNC: 3.2 MG/DL — SIGNIFICANT CHANGE UP (ref 2.5–4.5)
PLATELET # BLD AUTO: 354 K/UL — SIGNIFICANT CHANGE UP (ref 150–400)
POTASSIUM SERPL-MCNC: 4.8 MMOL/L — SIGNIFICANT CHANGE UP (ref 3.5–5.3)
POTASSIUM SERPL-SCNC: 4.8 MMOL/L — SIGNIFICANT CHANGE UP (ref 3.5–5.3)
PROT SERPL-MCNC: 5.9 G/DL — LOW (ref 6–8.3)
RBC # BLD: 4.81 M/UL — SIGNIFICANT CHANGE UP (ref 4.2–5.8)
RBC # FLD: 16.1 % — HIGH (ref 10.3–14.5)
SODIUM SERPL-SCNC: 137 MMOL/L — SIGNIFICANT CHANGE UP (ref 135–145)
WBC # BLD: 10.05 K/UL — SIGNIFICANT CHANGE UP (ref 3.8–10.5)
WBC # FLD AUTO: 10.05 K/UL — SIGNIFICANT CHANGE UP (ref 3.8–10.5)

## 2018-02-21 PROCEDURE — 99233 SBSQ HOSP IP/OBS HIGH 50: CPT

## 2018-02-21 PROCEDURE — 99233 SBSQ HOSP IP/OBS HIGH 50: CPT | Mod: GC

## 2018-02-21 PROCEDURE — 99232 SBSQ HOSP IP/OBS MODERATE 35: CPT | Mod: GC

## 2018-02-21 PROCEDURE — 77470 SPECIAL RADIATION TREATMENT: CPT | Mod: 26

## 2018-02-21 PROCEDURE — 99223 1ST HOSP IP/OBS HIGH 75: CPT

## 2018-02-21 RX ORDER — SODIUM CHLORIDE 9 MG/ML
1000 INJECTION, SOLUTION INTRAVENOUS
Qty: 0 | Refills: 0 | Status: DISCONTINUED | OUTPATIENT
Start: 2018-02-21 | End: 2018-03-01

## 2018-02-21 RX ORDER — DOCUSATE SODIUM 100 MG
100 CAPSULE ORAL THREE TIMES A DAY
Qty: 0 | Refills: 0 | Status: DISCONTINUED | OUTPATIENT
Start: 2018-02-21 | End: 2018-03-02

## 2018-02-21 RX ORDER — SENNA PLUS 8.6 MG/1
2 TABLET ORAL AT BEDTIME
Qty: 0 | Refills: 0 | Status: DISCONTINUED | OUTPATIENT
Start: 2018-02-21 | End: 2018-03-02

## 2018-02-21 RX ADMIN — SENNA PLUS 2 TABLET(S): 8.6 TABLET ORAL at 22:24

## 2018-02-21 RX ADMIN — MORPHINE SULFATE 2 MILLIGRAM(S): 50 CAPSULE, EXTENDED RELEASE ORAL at 22:30

## 2018-02-21 RX ADMIN — MORPHINE SULFATE 4 MILLIGRAM(S): 50 CAPSULE, EXTENDED RELEASE ORAL at 04:30

## 2018-02-21 RX ADMIN — Medication 4 MILLIGRAM(S): at 06:07

## 2018-02-21 RX ADMIN — MORPHINE SULFATE 4 MILLIGRAM(S): 50 CAPSULE, EXTENDED RELEASE ORAL at 16:40

## 2018-02-21 RX ADMIN — PANTOPRAZOLE SODIUM 40 MILLIGRAM(S): 20 TABLET, DELAYED RELEASE ORAL at 06:07

## 2018-02-21 RX ADMIN — Medication 4 MILLIGRAM(S): at 17:53

## 2018-02-21 RX ADMIN — ONDANSETRON 4 MILLIGRAM(S): 8 TABLET, FILM COATED ORAL at 11:31

## 2018-02-21 RX ADMIN — MORPHINE SULFATE 4 MILLIGRAM(S): 50 CAPSULE, EXTENDED RELEASE ORAL at 05:00

## 2018-02-21 RX ADMIN — Medication 2: at 12:39

## 2018-02-21 RX ADMIN — ENOXAPARIN SODIUM 40 MILLIGRAM(S): 100 INJECTION SUBCUTANEOUS at 12:52

## 2018-02-21 RX ADMIN — SODIUM CHLORIDE 50 MILLILITER(S): 9 INJECTION, SOLUTION INTRAVENOUS at 15:47

## 2018-02-21 RX ADMIN — MORPHINE SULFATE 2 MILLIGRAM(S): 50 CAPSULE, EXTENDED RELEASE ORAL at 20:30

## 2018-02-21 RX ADMIN — Medication 100 MILLIGRAM(S): at 22:25

## 2018-02-21 RX ADMIN — MORPHINE SULFATE 4 MILLIGRAM(S): 50 CAPSULE, EXTENDED RELEASE ORAL at 15:40

## 2018-02-21 RX ADMIN — Medication 100 MILLIGRAM(S): at 15:44

## 2018-02-21 NOTE — CONSULT NOTE ADULT - ASSESSMENT
62 year old man  newly diagnosed with Stage IV poorly differentiated esophageal adenocarcinoma admitted 2/20 for severe dehydration secondary to inability to tolerate po. Patient with c/o nausea/vomiting, difficulty swallowing, abdominal pain, and weakness.  Palliative team called to discuss GOC with patient.

## 2018-02-21 NOTE — PROGRESS NOTE ADULT - PROBLEM SELECTOR PLAN 3
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 CTAP shows no change from previous: Distal esophageal mass, diffuse pulmonary, mediastinal, hilar and hepatic metastatic disease  -continue w/ decadron  - Gastroenterology consulted for possible PEG placement. Onco recs pending.  - C/w with Oral Morphine as needed, tolerating well. - Most likely in setting of hepatic metastases.   - CTM on labs.

## 2018-02-21 NOTE — PHYSICAL THERAPY INITIAL EVALUATION ADULT - PERTINENT HX OF CURRENT PROBLEM, REHAB EVAL
62M with esophageal CA with metastasis to liver, lungs and brain presents with weakness, anorexia and vomiting pending further evaluation for chemotherapy and gamma knife radiation by Oncology & Rad Onc, respectively, likely will need GI consult for possible esophageal stent vs. PEG placement

## 2018-02-21 NOTE — PROGRESS NOTE ADULT - PROBLEM SELECTOR PROBLEM 4
Elevated LFTs Type 2 diabetes mellitus without complication, without long-term current use of insulin

## 2018-02-21 NOTE — PROGRESS NOTE ADULT - ATTENDING COMMENTS
Metastatic esophageal cancer with significant dysphagia and significant malnutrition / dehydration.    Discussed with oncologist Dr. Harper.  IR gastrostomy deemed technically difficult by interventional radiologist.  Esophageal stent is a reasonable option for this patient with advanced cancer and malnutrition, who will be offered palliative chemotherapy by oncology team.  Discussed risks / benefits / alternatives with patient and family, including but not limited to, risks of chest pain, bleeding, migration, stent blockage, and perforation.  They understand and agree to proceed.  Plan for EGD/esophageal stent placement on 2/22/18

## 2018-02-21 NOTE — PROGRESS NOTE ADULT - PROBLEM SELECTOR PLAN 2
d/w GI today.   Plan for esophageal stent tomorrow. This will hopefully enable pt to eat by mouth and thus better for QOL c/w feeding tube.

## 2018-02-21 NOTE — PROGRESS NOTE ADULT - SUBJECTIVE AND OBJECTIVE BOX
Patient is a 62y old  Male who presents with a chief complaint of Weakness, N/V, (2018 03:03)    SUBJECTIVE / OVERNIGHT EVENTS:  No acute overnight.     MEDICATIONS  (STANDING):  dexamethasone     Tablet 4 milliGRAM(s) Oral two times a day  dextrose 5% + sodium chloride 0.45%. 1000 milliLiter(s) (75 mL/Hr) IV Continuous <Continuous>  dextrose 5%. 1000 milliLiter(s) (50 mL/Hr) IV Continuous <Continuous>  dextrose 50% Injectable 12.5 Gram(s) IV Push once  dextrose 50% Injectable 25 Gram(s) IV Push once  dextrose 50% Injectable 25 Gram(s) IV Push once  docusate sodium 100 milliGRAM(s) Oral three times a day  enoxaparin Injectable 40 milliGRAM(s) SubCutaneous daily  insulin lispro (HumaLOG) corrective regimen sliding scale   SubCutaneous three times a day before meals  insulin lispro (HumaLOG) corrective regimen sliding scale   SubCutaneous at bedtime  pantoprazole    Tablet 40 milliGRAM(s) Oral before breakfast  senna 2 Tablet(s) Oral at bedtime    MEDICATIONS  (PRN):  dextrose Gel 1 Dose(s) Oral once PRN Blood Glucose LESS THAN 70 milliGRAM(s)/deciliter  glucagon  Injectable 1 milliGRAM(s) IntraMuscular once PRN Glucose LESS THAN 70 milligrams/deciliter  morphine   Solution 4 milliGRAM(s) Oral every 6 hours PRN Severe Pain (7 - 10)  morphine   Solution 2 milliGRAM(s) Oral every 6 hours PRN Moderate Pain (4 - 6)  ondansetron Injectable 4 milliGRAM(s) IV Push every 6 hours PRN Nausea and/or Vomiting      Vital Signs Last 24 Hrs  T(C): 36.7 (2018 09:37), Max: 36.7 (2018 15:38)  T(F): 98.1 (2018 09:37), Max: 98.1 (2018 15:38)  HR: 80 (2018 09:37) (74 - 93)  BP: 119/78 (2018 09:37) (118/78 - 142/84)  BP(mean): --  RR: 18 (2018 09:37) (18 - 18)  SpO2: 97% (2018 09:37) (96% - 99%)  CAPILLARY BLOOD GLUCOSE      POCT Blood Glucose.: 150 mg/dL (2018 08:14)  POCT Blood Glucose.: 147 mg/dL (2018 21:13)  POCT Blood Glucose.: 143 mg/dL (2018 18:19)  POCT Blood Glucose.: 195 mg/dL (2018 12:45)    I&O's Summary    2018 07:  -  2018 07:00  --------------------------------------------------------  IN: 0 mL / OUT: 0 mL / NET: 0 mL    2018 07:01  -  2018 10:05  --------------------------------------------------------  IN: 240 mL / OUT: 0 mL / NET: 240 mL        PHYSICAL EXAM:  GENERAL: NAD, well-developed  HEAD:  Atraumatic, Normocephalic  EYES: EOMI, PERRLA, conjunctiva and sclera clear  NECK: Supple, No JVD  CHEST/LUNG: Clear to auscultation bilaterally; No wheeze  HEART: Regular rate and rhythm; Normal S1 S2, No murmurs, rubs, or gallops  ABDOMEN: Soft, Nontender, Nondistended; Bowel sounds present  EXTREMITIES:  2+ Peripheral Pulses, No clubbing, cyanosis, or edema  PSYCH: AAOx3  NEUROLOGY: non-focal  SKIN: No rashes or lesions    LABS:                        13.9   13.8  )-----------( 466      ( 2018 06:32 )             41.1     02-20    139  |  101  |  30<H>  ----------------------------<  97  4.6   |  21<L>  |  0.70    Ca    8.9      2018 06:35  Phos  2.6     02-20  Mg     2.3     02-20    TPro  6.6  /  Alb  3.1<L>  /  TBili  1.3<H>  /  DBili  x   /  AST  175<H>  /  ALT  135<H>  /  AlkPhos  352<H>      PT/INR - ( 2018 17:43 )   PT: 12.9 sec;   INR: 1.19 ratio    PTT - ( 2018 17:43 )  PTT:24.9 sec    Urinalysis Basic - ( 2018 17:48 )    Color: Yellow / Appearance: Clear / S.028 / pH: x  Gluc: x / Ketone: Moderate  / Bili: Negative / Urobili: Negative   Blood: x / Protein: Trace / Nitrite: Negative   Leuk Esterase: Negative / RBC: x / WBC x   Sq Epi: x / Non Sq Epi: x / Bacteria: x        RADIOLOGY & ADDITIONAL TESTS:    Imaging Personally Reviewed:    Consultant(s) Notes Reviewed:      Care Discussed with Consultants/Other Providers: Patient is a 62y old  Male who presents with a chief complaint of Weakness, N/V, (2018 03:03)    SUBJECTIVE / OVERNIGHT EVENTS:  No acute overnight. Patient tolerating diet and oral morphine well. Patient denied fever, chills, nausea, vomiting, diarrhea, constipation, chest pain, focal weakness, blurry vision, headaches.     MEDICATIONS  (STANDING):  dexamethasone     Tablet 4 milliGRAM(s) Oral two times a day  dextrose 5% + sodium chloride 0.45%. 1000 milliLiter(s) (75 mL/Hr) IV Continuous <Continuous>  dextrose 5%. 1000 milliLiter(s) (50 mL/Hr) IV Continuous <Continuous>  dextrose 50% Injectable 12.5 Gram(s) IV Push once  dextrose 50% Injectable 25 Gram(s) IV Push once  dextrose 50% Injectable 25 Gram(s) IV Push once  docusate sodium 100 milliGRAM(s) Oral three times a day  enoxaparin Injectable 40 milliGRAM(s) SubCutaneous daily  insulin lispro (HumaLOG) corrective regimen sliding scale   SubCutaneous three times a day before meals  insulin lispro (HumaLOG) corrective regimen sliding scale   SubCutaneous at bedtime  pantoprazole    Tablet 40 milliGRAM(s) Oral before breakfast  senna 2 Tablet(s) Oral at bedtime    MEDICATIONS  (PRN):  dextrose Gel 1 Dose(s) Oral once PRN Blood Glucose LESS THAN 70 milliGRAM(s)/deciliter  glucagon  Injectable 1 milliGRAM(s) IntraMuscular once PRN Glucose LESS THAN 70 milligrams/deciliter  morphine   Solution 4 milliGRAM(s) Oral every 6 hours PRN Severe Pain (7 - 10)  morphine   Solution 2 milliGRAM(s) Oral every 6 hours PRN Moderate Pain (4 - 6)  ondansetron Injectable 4 milliGRAM(s) IV Push every 6 hours PRN Nausea and/or Vomiting    Vital Signs Last 24 Hrs  T(C): 36.7 (2018 09:37), Max: 36.7 (2018 15:38)  T(F): 98.1 (2018 09:37), Max: 98.1 (2018 15:38)  HR: 80 (2018 09:37) (74 - 93)  BP: 119/78 (2018 09:37) (118/78 - 142/84)  RR: 18 (2018 09:37) (18 - 18)  SpO2: 97% (2018 09:37) (96% - 99%)  CAPILLARY BLOOD GLUCOSE    POCT Blood Glucose.: 150 mg/dL (2018 08:14)  POCT Blood Glucose.: 147 mg/dL (2018 21:13)  POCT Blood Glucose.: 143 mg/dL (2018 18:19)  POCT Blood Glucose.: 195 mg/dL (2018 12:45)    I&O's Summary    2018 07:  -  2018 07:00  --------------------------------------------------------  IN: 0 mL / OUT: 0 mL / NET: 0 mL    2018 07:01  -  2018 10:05  --------------------------------------------------------  IN: 240 mL / OUT: 0 mL / NET: 240 mL    PHYSICAL EXAM:  GENERAL: NAD, well-developed  HEAD:  Atraumatic, Normocephalic  EYES: EOMI, conjunctiva and sclera clear  NECK: Supple, No JVD  CHEST/LUNG: Clear to auscultation bilaterally; No wheeze  HEART: Regular rate and rhythm; Normal S1 S2, No murmurs, rubs, or gallops  ABDOMEN: Soft,, Nondistended; Normal bowels sounds. No palpable mass.   EXTREMITIES:  2+ Peripheral Pulses, No clubbing, cyanosis, or edema  PSYCH: AAOx3  NEUROLOGY: non-focal  SKIN: No rashes or lesions    LABS:                        13.9   13.8  )-----------( 466      ( 2018 06:32 )             41.1     02-20    139  |  101  |  30<H>  ----------------------------<  97  4.6   |  21<L>  |  0.70    Ca    8.9      2018 06:35  Phos  2.6     02-20  Mg     2.3     02-20    TPro  6.6  /  Alb  3.1<L>  /  TBili  1.3<H>  /  DBili  x   /  AST  175<H>  /  ALT  135<H>  /  AlkPhos  352<H>  02-20    PT/INR - ( 2018 17:43 )   PT: 12.9 sec;   INR: 1.19 ratio    PTT - ( 2018 17:43 )  PTT:24.9 sec                          13.0   10.05 )-----------( 354      ( 2018 07:35 )             39.1     Hgb Trend: 13.0<--, 13.9<--, 15.8<--  02-    137  |  97  |  20  ----------------------------<  158<H>  4.8   |  20<L>  |  0.55    Ca    8.6      2018 09:28  Phos  3.2     02-21  Mg     2.3     -21    TPro  5.9<L>  /  Alb  2.6<L>  /  TBili  1.2  /  DBili  x   /  AST  157<H>  /  ALT  132<H>  /  AlkPhos  297<H>      Creatinine Trend: 0.55<--, 0.70<--, 0.95<--, 1.03<--, 0.77<--, 1.01<--  PT/INR - ( 2018 17:43 )   PT: 12.9 sec;   INR: 1.19 ratio         PTT - ( 2018 17:43 )  PTT:24.9 sec  Urinalysis Basic - ( 2018 17:48 )    Color: Yellow / Appearance: Clear / S.028 / pH: x  Gluc: x / Ketone: Moderate  / Bili: Negative / Urobili: Negative   Blood: x / Protein: Trace / Nitrite: Negative   Leuk Esterase: Negative / RBC: x / WBC x   Sq Epi: x / Non Sq Epi: x / Bacteria: x    Culture - Urine (18 @ 22:49)    Specimen Source: .Urine Clean Catch (Midstream)    Culture Results:   No growth    Culture - Blood (18 @ 22:33)    Specimen Source: .Blood Blood-Peripheral    Culture Results:   No growth to date.          RADIOLOGY & ADDITIONAL TESTS:    Imaging Personally Reviewed:    Consultant(s) Notes Reviewed:      Care Discussed with Consultants/Other Providers:

## 2018-02-21 NOTE — CONSULT NOTE ADULT - PROBLEM SELECTOR RECOMMENDATION 9
Diagnosed Stage IV esophageal cancer 2/2018. Patient interested in pursuing treatment. Patient being followed by Dr. Ansari , disease modifying therapies currently being evaluated by team.  Seen at [facility] early in the week who recommended [facility] follow up
Pt with advanced cancer not yet started on disease modifying therapy. Pt with declining performance status. Pt and wife are hopeful to be able to receive palliative chemotherapy if nutritional status improves.   Will need to discuss with out pt oncologist which chemotherapy regimen we will plan to treat pt with.   Her2 and other studies pending.

## 2018-02-21 NOTE — PROGRESS NOTE ADULT - ASSESSMENT
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 CTAP-no acute infectious source identified, CXR-no signs of pna

## 2018-02-21 NOTE — CONSULT NOTE ADULT - PROBLEM SELECTOR RECOMMENDATION 4
Palliative team called for goals of care discussion. Patient identified his wife Chana as his HCP. He is a full code at this time and wants to pursue any and all treatments he can for his diagnosis. Patient was completely independent, feeling well and working full time up until approximately one month ago when his symptoms began. He feels that if his nutritional status can be improved, he will have the ability to receive the treatment he desires. He is accepting of treatment for symptom management of his pain, nausea, and weakness.

## 2018-02-21 NOTE — PROGRESS NOTE ADULT - PROBLEM SELECTOR PLAN 7
-full liquid diet, ensure cans as patient can tolerate, cannot tolerate solid food  -PT consult  -currently: FULL CODE  -prophylactic measure: lovenox in setting of malignancy

## 2018-02-21 NOTE — PROGRESS NOTE ADULT - PROBLEM SELECTOR PLAN 5
- In setting of malignancy  - Follow up nutritionist recs once tolerating PO. -full liquid diet, ensure cans as patient can tolerate, cannot tolerate solid food  -PT consult  -currently: FULL CODE  -prophylactic measure: lovenox in setting of malignancy

## 2018-02-21 NOTE — PROGRESS NOTE ADULT - PROBLEM SELECTOR PLAN 2
- Improving.   - Patient most likely w/ starvation ketosis in setting of malnutrition, minimal oral/fluid intake.  - Replete electrolytes as needed. - In setting of malignancy w/ metastatic disease.    - Nutrition recs one tolerating PO  - IR does not have good windows for PEG tube given hepatomegaly. Likely has to have endoscopic placement.

## 2018-02-21 NOTE — PROGRESS NOTE ADULT - ATTENDING COMMENTS
IR unable to do PEG due to anatomy. Will speak with GI, and if needed surgery consult for PEG placement.   Pain well controlled.

## 2018-02-21 NOTE — CONSULT NOTE ADULT - SUBJECTIVE AND OBJECTIVE BOX
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 As per patient, has been feeling weak, and has had two episodes of NBNB vomiting, w/ chronic abdominal pain which has been going on since his diagnosis from recent admission. Also states, he is feeling SOB for the last two days upon exertion. No c/o of CP, palpitations, fevers/chills, uri symptoms, diaphoresis, diarrhea, constipation, dysuria, incontinence. All other ROS is negative. As per wife, patient is not eating. She tries to give him soup, but he has not been tolerating it due to loss of appetite, and has been losing 1-2 lbs/day.    Of note, patient had recent admission - where he was newly diagnosed metastatic w/ poorly differentiated adenocarcinoma s/p EGD w/ biopsy. He saw Dr. Ansari (oncologist) outpatient and recommended starting chemotherapy as per wife. Oncologist referred to radiation oncologist, Dr. Brooks who suggested radiation and Gamma Knife. Patient and wife also got second opinion from [facility] day of admission who recommended starting w/ chemotherapy first and possible esophageal stent placement. Patient and wife have not made further decisions regarding treatment yet.     Also of note, patient had TIA 2018, and was followed by neurologist, and currently has loop recorder placed as per neurologist.      PERTINENT PM/SXH:   Esophageal cancer  TIA (transient ischemic attack)  Diabetes    H/O prior ablation treatment  Status post placement of implantable loop recorder    SOCIAL HISTORY:   Significant other/partner:  [x ] YES  [ ] NO               Children:  [ x] YES  [ ] NO                   Lutheran/Spirituality: unknown  Substance hx:  [ ] YES   [x ] NO                   Tobacco hx:  [ ] YES  [ x] NO                       Alcohol hx: [ ] YES  [x ] NO         Home Opioid hx:  [ ] YES  [ x] NO   Living Situation: [x ] Home  [ ] Long term care  [ ] Rehab [ ] Other    FAMILY HISTORY:  Family history of hypertension (Father)    [x ] Family history non-contributory     BASELINE (I)ADLs (prior to admission):  Richardson: [x ] total  [ ] moderate [ ] dependent    ADVANCE DIRECTIVES:    DNR   MOLST  [ ] YES [x ] NO                      [ ] Completed  Health Care Proxy [x ] YES  [ ] NO   [ ] Completed   Living Will  [ ] YES [x ] NO             [x ] Surrogate  [ ] HCP  [ ] Guardian:      Chana Albert                   Phone#:     Allergies    No Known Allergies    Intolerances        MEDICATIONS  (STANDING):  dexamethasone     Tablet 4 milliGRAM(s) Oral two times a day  dextrose 5% + sodium chloride 0.45%. 1000 milliLiter(s) (75 mL/Hr) IV Continuous <Continuous>  dextrose 5%. 1000 milliLiter(s) (50 mL/Hr) IV Continuous <Continuous>  dextrose 50% Injectable 12.5 Gram(s) IV Push once  dextrose 50% Injectable 25 Gram(s) IV Push once  dextrose 50% Injectable 25 Gram(s) IV Push once  docusate sodium 100 milliGRAM(s) Oral three times a day  enoxaparin Injectable 40 milliGRAM(s) SubCutaneous daily  insulin lispro (HumaLOG) corrective regimen sliding scale   SubCutaneous three times a day before meals  insulin lispro (HumaLOG) corrective regimen sliding scale   SubCutaneous at bedtime  pantoprazole    Tablet 40 milliGRAM(s) Oral before breakfast  senna 2 Tablet(s) Oral at bedtime    MEDICATIONS  (PRN):  dextrose Gel 1 Dose(s) Oral once PRN Blood Glucose LESS THAN 70 milliGRAM(s)/deciliter  glucagon  Injectable 1 milliGRAM(s) IntraMuscular once PRN Glucose LESS THAN 70 milligrams/deciliter  morphine   Solution 4 milliGRAM(s) Oral every 6 hours PRN Severe Pain (7 - 10)  morphine   Solution 2 milliGRAM(s) Oral every 6 hours PRN Moderate Pain (4 - 6)  ondansetron Injectable 4 milliGRAM(s) IV Push every 6 hours PRN Nausea and/or Vomiting      PRESENT SYMPTOMS:  Source: [x ] Patient   [ ] Family   [ ] Team     Pain:                        [ ] No [ x] Yes             [ ] Mild [ x] Moderate [ ] Severe    Onset -  Location -abdominal  Duration -  Character -  Alleviating/Aggravating -  Radiation -  Timing -      Dyspnea:                [ ] No [ x] Yes             [ x] Mild [ ] Moderate [ ] Severe    Anxiety:                  [x ] No [ ] Yes             [ ] Mild [ ] Moderate [ ] Severe    Fatigue:                  [ ] No [ x] Yes             [ ] Mild [ ] Moderate [ x] Severe    Nausea:                  [ ] No [ x] Yes             [ ] Mild [x ] Moderate [ ] Severe    Loss of appetite:   [ ] No [ x] Yes             [ ] Mild [ ] Moderate [x ] Severe    Constipation:        [ x] No [ ] Yes             [ ] Mild [ ] Moderate [ ] Severe    Other Symptoms:  [ x] All other review of systems negative   [ ] Unable to obtain due to poor mentation     Karnofsky Performance Score/Palliative Performance Status Version 2:      50%    PHYSICAL EXAM:  Vital Signs Last 24 Hrs  T(C): 36.7 (2018 09:37), Max: 36.7 (2018 15:38)  T(F): 98.1 (2018 09:37), Max: 98.1 (2018 15:38)  HR: 80 (2018 09:37) (74 - 93)  BP: 119/78 (2018 09:37) (118/78 - 142/84)  BP(mean): --  RR: 18 (2018 09:37) (18 - 18)  SpO2: 97% (2018 09:37) (96% - 99%) I&O's Summary    2018 07:  -  2018 07:00  --------------------------------------------------------  IN: 0 mL / OUT: 0 mL / NET: 0 mL    2018 07:  -  2018 13:36  --------------------------------------------------------  IN: 240 mL / OUT: 0 mL / NET: 240 mL        General:  [x ] Alert  [x ] Oriented x  3    [ ] Lethargic  [ ] Agitated   [ ] Cachexia   [ ] Unarousable  [x ] Verbal  [ ] Non-Verbal    HEENT:  [x ] Normal   [ ] Dry mouth   [ ] ET Tube    [ ] Trach  [ ] Oral lesions    Lungs:   [ x] Clear [ ] Tachypnea  [ ] Audible excessive secretions   [ ] Rhonchi        [ ] Right [ ] Left [ ] Bilateral  [ ] Crackles        [ ] Right [ ] Left [ ] Bilateral  [ ] Wheezing     [ ] Right [ ] Left [ ] Bilateral    Cardiovascular:  [x ] Regular [ ] Irregular [ ] Tachycardia   [ ] Bradycardia  [ ] Murmur [ ] Other    Abdomen: [ x] Soft  [x ] Distended   [ x] +BS  [ ] Non tender [x ] Tender  [ ]PEG   [ ]OGT/ NGT   Last BM:       Genitourinary: [x ] Normal [ ] Incontinent   [ ] Oliguria/Anuria   [ ] Tovar    Musculoskeletal:  [ ] Normal   [ x] Weakness  [ ] Bedbound/Wheelchair bound [ ] Edema    Neurological: [x ] No focal deficits  [ ] Cognitive impairment  [ ] Dysphagia [ ] Dysarthria [ ] Paresis [ ] Other     Skin: [x ] Normal   [ ] Pressure ulcer(s)                  [ ] Rash    LABS:                        13.0   10.05 )-----------( 354      ( 2018 07:35 )             39.1         137  |  97  |  20  ----------------------------<  158<H>  4.8   |  20<L>  |  0.55    Ca    8.6      2018 09:28  Phos  3.2       Mg     2.3         TPro  5.9<L>  /  Alb  2.6<L>  /  TBili  1.2  /  DBili  x   /  AST  157<H>  /  ALT  132<H>  /  AlkPhos  297<H>      PT/INR - ( 2018 17:43 )   PT: 12.9 sec;   INR: 1.19 ratio         PTT - ( 2018 17:43 )  PTT:24.9 sec  Urinalysis Basic - ( 2018 17:48 )    Color: Yellow / Appearance: Clear / S.028 / pH: x  Gluc: x / Ketone: Moderate  / Bili: Negative / Urobili: Negative   Blood: x / Protein: Trace / Nitrite: Negative   Leuk Esterase: Negative / RBC: x / WBC x   Sq Epi: x / Non Sq Epi: x / Bacteria: x        Shock: [ ] Septic [ ] Cardiogenic [ ] Neurologic [ ] Hypovolemic  Vasopressors x   Inotrop<hs x     Protein Calorie Malnutrition: [ ] Mild [x ] Moderate [ ] Severe    Oral Intake: [ ] Unable/mouth care only [x ] Minimal [ ] Moderate [ ] Full Capability  Diet: [ ] NPO [ ] Tube feeds [ ] TPN [ ] Other     RADIOLOGY & ADDITIONAL STUDIES:       from: CT Abdomen and Pelvis w/ IV Cont (18 @ 21:29) >  IMPRESSION: No significant interval change since 2018. Distal   esophageal mass, diffuse pulmonary, mediastinal, hilar and hepatic   metastatic disease, not significantly changed since 2018. Scattered   subcutaneous nodules in the right chest and ventral abdominal wall and   left adrenal nodule, suspicious for metastases.      < end of copied text >    REFERRALS:   [ ] Chaplaincy  [ ] Hospice  [ ] Child Life  [ ] Social Work  [ ] Case management [ ] Holistic Therapy

## 2018-02-21 NOTE — CONSULT NOTE ADULT - PROBLEM SELECTOR RECOMMENDATION 3
Patient c/o severe anorexia since diagnosis. Reports only being able to take minimal amounts by mouth and anorexia is accompanied by nausea, vomiting and dysphagia. Awaiting GI discussion about possible g-tube placement which patient is interested in pursuing to help his nutritional status.
Hopefully performance status will improve as pt's nutritional status can be optimized.

## 2018-02-21 NOTE — PROGRESS NOTE ADULT - SUBJECTIVE AND OBJECTIVE BOX
Chief Complaint: metastatic esophageal ca     INTERVAL HPI/OVERNIGHT EVENTS: Unchanged clinically. Was able to eat soup today. Ongoing weakness.     MEDICATIONS  (STANDING):  dexamethasone     Tablet 4 milliGRAM(s) Oral two times a day  dextrose 5% + sodium chloride 0.45%. 1000 milliLiter(s) (75 mL/Hr) IV Continuous <Continuous>  dextrose 5% + sodium chloride 0.9%. 1000 milliLiter(s) (50 mL/Hr) IV Continuous <Continuous>  dextrose 5%. 1000 milliLiter(s) (50 mL/Hr) IV Continuous <Continuous>  dextrose 50% Injectable 12.5 Gram(s) IV Push once  dextrose 50% Injectable 25 Gram(s) IV Push once  dextrose 50% Injectable 25 Gram(s) IV Push once  docusate sodium 100 milliGRAM(s) Oral three times a day  insulin lispro (HumaLOG) corrective regimen sliding scale   SubCutaneous three times a day before meals  insulin lispro (HumaLOG) corrective regimen sliding scale   SubCutaneous at bedtime  pantoprazole    Tablet 40 milliGRAM(s) Oral before breakfast  senna 2 Tablet(s) Oral at bedtime    MEDICATIONS  (PRN):  dextrose Gel 1 Dose(s) Oral once PRN Blood Glucose LESS THAN 70 milliGRAM(s)/deciliter  glucagon  Injectable 1 milliGRAM(s) IntraMuscular once PRN Glucose LESS THAN 70 milligrams/deciliter  morphine   Solution 4 milliGRAM(s) Oral every 6 hours PRN Severe Pain (7 - 10)  morphine   Solution 2 milliGRAM(s) Oral every 6 hours PRN Moderate Pain (4 - 6)  ondansetron Injectable 4 milliGRAM(s) IV Push every 6 hours PRN Nausea and/or Vomiting      Allergies    No Known Allergies    Intolerances        ROS: as above     Vital Signs Last 24 Hrs  T(C): 36.7 (21 Feb 2018 22:20), Max: 36.9 (21 Feb 2018 13:48)  T(F): 98.1 (21 Feb 2018 22:20), Max: 98.5 (21 Feb 2018 13:48)  HR: 79 (21 Feb 2018 22:20) (74 - 88)  BP: 125/81 (21 Feb 2018 22:20) (111/73 - 142/84)  BP(mean): --  RR: 18 (21 Feb 2018 22:20) (18 - 18)  SpO2: 100% (21 Feb 2018 22:20) (94% - 100%)    Physical Exam:   AAO x 3, NAD, cachectic    RRR S1S2  CTA b/l   soft NTNDBS+  no c/c/e    LABS:                        13.0   10.05 )-----------( 354      ( 21 Feb 2018 07:35 )             39.1     02-21    137  |  97  |  20  ----------------------------<  158<H>  4.8   |  20<L>  |  0.55    Ca    8.6      21 Feb 2018 09:28  Phos  3.2     02-21  Mg     2.3     02-21    TPro  5.9<L>  /  Alb  2.6<L>  /  TBili  1.2  /  DBili  x   /  AST  157<H>  /  ALT  132<H>  /  AlkPhos  297<H>  02-21

## 2018-02-21 NOTE — PROGRESS NOTE ADULT - PROBLEM SELECTOR PLAN 1
Plan for disease modifying therapy once nutritional status can be improved.   Elevated LFTs likely to be related to hepatic mets and may affect the type of chemotherapy chosen for treatment.   Overall pt's performance status is borderline however prior to this several weeks ago, pt was active, going to work daily.   Would like to give him a chance to see if DMT can improve QOL and symptom burden.   Awaiting her 2 status. Will discuss with out pt oncologist if tumor sent for foundation evaluation

## 2018-02-21 NOTE — CONSULT NOTE ADULT - PROBLEM SELECTOR RECOMMENDATION 2
Patient admitted with severe dehydration and anion gap metabolic acidosis. Improving with IV hydration.
GI eval for esophageal stent. Trying to avoid feeding tube in this patient as oral route is always preferred.

## 2018-02-21 NOTE — PROGRESS NOTE ADULT - PROBLEM SELECTOR PROBLEM 6
Type 2 diabetes mellitus without complication, without long-term current use of insulin Goals of care, counseling/discussion

## 2018-02-21 NOTE — PROGRESS NOTE ADULT - SUBJECTIVE AND OBJECTIVE BOX
SUBJECTIVE:  - Pt receiving IVF  - No fever or chills  - He denies abdominal pain    OBJECTIVE:    Vital Signs Last 24 Hrs  T(C): 36.3 (21 Feb 2018 04:38), Max: 36.7 (20 Feb 2018 08:31)  T(F): 97.4 (21 Feb 2018 04:38), Max: 98.1 (20 Feb 2018 15:38)  HR: 74 (21 Feb 2018 04:38) (74 - 102)  BP: 142/84 (21 Feb 2018 04:38) (118/78 - 142/84)  BP(mean): --  RR: 18 (21 Feb 2018 04:38) (18 - 19)  SpO2: 99% (21 Feb 2018 04:38) (91% - 99%)    PHYSICAL EXAM:  Constitutional: no acute distress  Eyes: no icterus  Neck: no masses, no LAD  Respiratory: normal inspiratory effort; no wheezing or crackles  Cardiovascular: RRR, normal S1/S2, no murmurs/rubs/gallops  Gastrointestinal: soft, nondistended, nontender, +BS  Extremities: no LE edema  Neurological: AAOx3, no asterixis  Skin: no rashes, bruises, petechiae    LABS:                        13.9   13.8  )-----------( 466      ( 20 Feb 2018 06:32 )             41.1     139  |  101  |  30<H>  ----------------------------<  97  4.6   |  21<L>  |  0.70    Ca    8.9      20 Feb 2018 06:35  Phos  2.6     02-20  Mg     2.3     02-20  TPro  6.6  /  Alb  3.1<L>  /  TBili  1.3<H>  /  DBili  x   /  AST  175<H>  /  ALT  135<H>  /  AlkPhos  352<H>  02-20  PT/INR - ( 19 Feb 2018 17:43 )   PT: 12.9 sec;   INR: 1.19 ratio    PTT - ( 19 Feb 2018 17:43 )  PTT:24.9 sec  LIVER FUNCTIONS - ( 20 Feb 2018 06:35 )  Alb: 3.1 g/dL / Pro: 6.6 g/dL / ALK PHOS: 352 U/L / ALT: 135 U/L RC / AST: 175 U/L / GGT: x

## 2018-02-21 NOTE — PROGRESS NOTE ADULT - ASSESSMENT
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 He p/w reduced oral intake and appetite.    Impression:  1) Stage IV Esophageal Adenocarcinoma - with dysphagia.  2) Elevated liver enzymes - likely due to metastatic liver disease  3) Lactic Acidosis with Leukocytosis    Plan:  - F/u blood cultures and urine culture  - Correct electrolyte and metabolic abnormalities  - Usually oncology and surgical oncology reserve esophageal stenting for palliative settings when oncologic treatment have not had significant benefits.  - IR placed gastrostomy tube may be the procedure of choice at this time for this patient

## 2018-02-21 NOTE — PROGRESS NOTE ADULT - PROBLEM SELECTOR PLAN 1
- Improving. Most likely in setting of Type B lactic acidosis in setting of malignancy vs less likely possible infection as patient meets SIRS criteria w/ increased lactate, leukocytosis, and tachycardia, unclear source. CTAP-no acute infectious source identified, CXR-no signs of pna. Cultures pending. 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 CTAP shows no change from previous: Distal esophageal mass, diffuse pulmonary, mediastinal, hilar and hepatic metastatic disease  -continue w/ decadron  - Gastroenterology consulted for possible PEG placement. Onco recs pending.  - C/w with Oral Morphine as needed, tolerating well.

## 2018-02-22 DIAGNOSIS — R11.0 NAUSEA: ICD-10-CM

## 2018-02-22 DIAGNOSIS — G89.3 NEOPLASM RELATED PAIN (ACUTE) (CHRONIC): ICD-10-CM

## 2018-02-22 DIAGNOSIS — R13.10 DYSPHAGIA, UNSPECIFIED: ICD-10-CM

## 2018-02-22 LAB
ANION GAP SERPL CALC-SCNC: 17 MMOL/L — SIGNIFICANT CHANGE UP (ref 5–17)
APTT BLD: 26.2 SEC — LOW (ref 27.5–37.4)
BASOPHILS # BLD AUTO: 0.01 K/UL — SIGNIFICANT CHANGE UP (ref 0–0.2)
BASOPHILS NFR BLD AUTO: 0.1 % — SIGNIFICANT CHANGE UP (ref 0–2)
BUN SERPL-MCNC: 20 MG/DL — SIGNIFICANT CHANGE UP (ref 7–23)
CALCIUM SERPL-MCNC: 8.9 MG/DL — SIGNIFICANT CHANGE UP (ref 8.4–10.5)
CHLORIDE SERPL-SCNC: 95 MMOL/L — LOW (ref 96–108)
CO2 SERPL-SCNC: 24 MMOL/L — SIGNIFICANT CHANGE UP (ref 22–31)
CREAT SERPL-MCNC: 0.66 MG/DL — SIGNIFICANT CHANGE UP (ref 0.5–1.3)
EOSINOPHIL # BLD AUTO: 0 K/UL — SIGNIFICANT CHANGE UP (ref 0–0.5)
EOSINOPHIL NFR BLD AUTO: 0 % — SIGNIFICANT CHANGE UP (ref 0–6)
GLUCOSE SERPL-MCNC: 169 MG/DL — HIGH (ref 70–99)
HCT VFR BLD CALC: 40.1 % — SIGNIFICANT CHANGE UP (ref 39–50)
HGB BLD-MCNC: 13.4 G/DL — SIGNIFICANT CHANGE UP (ref 13–17)
IMM GRANULOCYTES NFR BLD AUTO: 0.7 % — SIGNIFICANT CHANGE UP (ref 0–1.5)
INR BLD: 1.2 RATIO — HIGH (ref 0.88–1.16)
LYMPHOCYTES # BLD AUTO: 0.94 K/UL — LOW (ref 1–3.3)
LYMPHOCYTES # BLD AUTO: 8.2 % — LOW (ref 13–44)
MAGNESIUM SERPL-MCNC: 2.2 MG/DL — SIGNIFICANT CHANGE UP (ref 1.6–2.6)
MCHC RBC-ENTMCNC: 27 PG — SIGNIFICANT CHANGE UP (ref 27–34)
MCHC RBC-ENTMCNC: 33.4 GM/DL — SIGNIFICANT CHANGE UP (ref 32–36)
MCV RBC AUTO: 80.8 FL — SIGNIFICANT CHANGE UP (ref 80–100)
MONOCYTES # BLD AUTO: 1.05 K/UL — HIGH (ref 0–0.9)
MONOCYTES NFR BLD AUTO: 9.1 % — SIGNIFICANT CHANGE UP (ref 2–14)
NEUTROPHILS # BLD AUTO: 9.43 K/UL — HIGH (ref 1.8–7.4)
NEUTROPHILS NFR BLD AUTO: 81.9 % — HIGH (ref 43–77)
PHOSPHATE SERPL-MCNC: 3.2 MG/DL — SIGNIFICANT CHANGE UP (ref 2.5–4.5)
PLATELET # BLD AUTO: 399 K/UL — SIGNIFICANT CHANGE UP (ref 150–400)
POTASSIUM SERPL-MCNC: 4.8 MMOL/L — SIGNIFICANT CHANGE UP (ref 3.5–5.3)
POTASSIUM SERPL-SCNC: 4.8 MMOL/L — SIGNIFICANT CHANGE UP (ref 3.5–5.3)
PROTHROM AB SERPL-ACNC: 13 SEC — HIGH (ref 9.8–12.7)
RBC # BLD: 4.96 M/UL — SIGNIFICANT CHANGE UP (ref 4.2–5.8)
RBC # FLD: 16.4 % — HIGH (ref 10.3–14.5)
SODIUM SERPL-SCNC: 136 MMOL/L — SIGNIFICANT CHANGE UP (ref 135–145)
WBC # BLD: 11.51 K/UL — HIGH (ref 3.8–10.5)
WBC # FLD AUTO: 11.51 K/UL — HIGH (ref 3.8–10.5)

## 2018-02-22 PROCEDURE — 99233 SBSQ HOSP IP/OBS HIGH 50: CPT | Mod: GC

## 2018-02-22 PROCEDURE — 99233 SBSQ HOSP IP/OBS HIGH 50: CPT

## 2018-02-22 PROCEDURE — 43266 EGD ENDOSCOPIC STENT PLACE: CPT | Mod: GC

## 2018-02-22 RX ORDER — MORPHINE SULFATE 50 MG/1
2 CAPSULE, EXTENDED RELEASE ORAL ONCE
Qty: 0 | Refills: 0 | Status: DISCONTINUED | OUTPATIENT
Start: 2018-02-22 | End: 2018-02-22

## 2018-02-22 RX ORDER — SENNA PLUS 8.6 MG/1
2 TABLET ORAL
Qty: 0 | Refills: 0 | COMMUNITY
Start: 2018-02-22

## 2018-02-22 RX ORDER — POLYETHYLENE GLYCOL 3350 17 G/17G
17 POWDER, FOR SOLUTION ORAL DAILY
Qty: 0 | Refills: 0 | Status: DISCONTINUED | OUTPATIENT
Start: 2018-02-23 | End: 2018-02-23

## 2018-02-22 RX ORDER — MORPHINE SULFATE 50 MG/1
5 CAPSULE, EXTENDED RELEASE ORAL EVERY 4 HOURS
Qty: 0 | Refills: 0 | Status: DISCONTINUED | OUTPATIENT
Start: 2018-02-22 | End: 2018-02-23

## 2018-02-22 RX ORDER — DOCUSATE SODIUM 100 MG
1 CAPSULE ORAL
Qty: 0 | Refills: 0 | COMMUNITY
Start: 2018-02-22

## 2018-02-22 RX ORDER — POLYETHYLENE GLYCOL 3350 17 G/17G
17 POWDER, FOR SOLUTION ORAL ONCE
Qty: 0 | Refills: 0 | Status: DISCONTINUED | OUTPATIENT
Start: 2018-02-22 | End: 2018-02-23

## 2018-02-22 RX ORDER — MORPHINE SULFATE 50 MG/1
2 CAPSULE, EXTENDED RELEASE ORAL EVERY 4 HOURS
Qty: 0 | Refills: 0 | Status: DISCONTINUED | OUTPATIENT
Start: 2018-02-22 | End: 2018-02-23

## 2018-02-22 RX ADMIN — PANTOPRAZOLE SODIUM 40 MILLIGRAM(S): 20 TABLET, DELAYED RELEASE ORAL at 06:17

## 2018-02-22 RX ADMIN — Medication 1: at 10:30

## 2018-02-22 RX ADMIN — Medication 100 MILLIGRAM(S): at 06:17

## 2018-02-22 RX ADMIN — MORPHINE SULFATE 2 MILLIGRAM(S): 50 CAPSULE, EXTENDED RELEASE ORAL at 23:35

## 2018-02-22 RX ADMIN — Medication 4 MILLIGRAM(S): at 06:17

## 2018-02-22 RX ADMIN — Medication 4 MILLIGRAM(S): at 17:37

## 2018-02-22 RX ADMIN — MORPHINE SULFATE 4 MILLIGRAM(S): 50 CAPSULE, EXTENDED RELEASE ORAL at 06:16

## 2018-02-22 RX ADMIN — MORPHINE SULFATE 4 MILLIGRAM(S): 50 CAPSULE, EXTENDED RELEASE ORAL at 06:45

## 2018-02-22 RX ADMIN — MORPHINE SULFATE 2 MILLIGRAM(S): 50 CAPSULE, EXTENDED RELEASE ORAL at 18:47

## 2018-02-22 RX ADMIN — MORPHINE SULFATE 5 MILLIGRAM(S): 50 CAPSULE, EXTENDED RELEASE ORAL at 18:31

## 2018-02-22 RX ADMIN — ONDANSETRON 4 MILLIGRAM(S): 8 TABLET, FILM COATED ORAL at 18:31

## 2018-02-22 RX ADMIN — MORPHINE SULFATE 2 MILLIGRAM(S): 50 CAPSULE, EXTENDED RELEASE ORAL at 23:50

## 2018-02-22 NOTE — DIETITIAN INITIAL EVALUATION ADULT. - ADHERENCE
good/Pt with T2DM follows diabetic diet, noted to have (2/7) HgbA1c 6.7 % indicating good glycemic control. Pt takes Janumet, Glyburide, and Invokana to manage DM. Pt checks SMBG 2x daily, reports generally WNL.

## 2018-02-22 NOTE — DIETITIAN INITIAL EVALUATION ADULT. - ENERGY NEEDS
Ht: 61 inches Wt: 118 pounds BMI: 22.3 kg/m2 IBW:  112 pounds(+/-10%) %%  No edema. No pressure ulcers documented.

## 2018-02-22 NOTE — DIETITIAN INITIAL EVALUATION ADULT. - PROBLEM SELECTOR PLAN 1
-increased lactate 10.7 on admission, now s/p 3L fluids, w/ improved lactate to 4.8  -most likely in setting of Type B lactic acidosis in setting of malignancy vs less likely possible infection as patient meets SIRS criteria w/ increased lactate, leukocytosis, and tachycardia, unclear source  -s/p 3L fluids, continues w/ NS @ 75cc's/hr, U/A-negative for LE/nitrites, will follow up urine culture and blood cultures, will hold off on abx for now as no clear source identified and patient clinically improving w/ fluids  -CTAP-no acute infectious source identified, CXR-no signs of pna  -leukocytosis may also be in setting of steroids vs reactive leukocytosis vs hemoconcentrated  -CBC w/ diff daily, CMP

## 2018-02-22 NOTE — PROGRESS NOTE ADULT - SUBJECTIVE AND OBJECTIVE BOX
Patient is a 62y old  Male who presents with a chief complaint of Weakness, N/V, (20 Feb 2018 03:03)    SUBJECTIVE / OVERNIGHT EVENTS:      MEDICATIONS  (STANDING):  dexamethasone     Tablet 4 milliGRAM(s) Oral two times a day  dextrose 5% + sodium chloride 0.45%. 1000 milliLiter(s) (75 mL/Hr) IV Continuous <Continuous>  dextrose 5% + sodium chloride 0.9%. 1000 milliLiter(s) (50 mL/Hr) IV Continuous <Continuous>  dextrose 5%. 1000 milliLiter(s) (50 mL/Hr) IV Continuous <Continuous>  dextrose 50% Injectable 12.5 Gram(s) IV Push once  dextrose 50% Injectable 25 Gram(s) IV Push once  dextrose 50% Injectable 25 Gram(s) IV Push once  docusate sodium 100 milliGRAM(s) Oral three times a day  insulin lispro (HumaLOG) corrective regimen sliding scale   SubCutaneous three times a day before meals  insulin lispro (HumaLOG) corrective regimen sliding scale   SubCutaneous at bedtime  pantoprazole    Tablet 40 milliGRAM(s) Oral before breakfast  senna 2 Tablet(s) Oral at bedtime    MEDICATIONS  (PRN):  dextrose Gel 1 Dose(s) Oral once PRN Blood Glucose LESS THAN 70 milliGRAM(s)/deciliter  glucagon  Injectable 1 milliGRAM(s) IntraMuscular once PRN Glucose LESS THAN 70 milligrams/deciliter  morphine   Solution 4 milliGRAM(s) Oral every 6 hours PRN Severe Pain (7 - 10)  morphine   Solution 2 milliGRAM(s) Oral every 6 hours PRN Moderate Pain (4 - 6)  ondansetron Injectable 4 milliGRAM(s) IV Push every 6 hours PRN Nausea and/or Vomiting      Vital Signs Last 24 Hrs  T(C): 36.4 (22 Feb 2018 01:43), Max: 36.9 (21 Feb 2018 13:48)  T(F): 97.5 (22 Feb 2018 01:43), Max: 98.5 (21 Feb 2018 13:48)  HR: 75 (22 Feb 2018 01:43) (75 - 88)  BP: 131/81 (22 Feb 2018 01:43) (111/73 - 131/81)  RR: 18 (22 Feb 2018 01:43) (18 - 18)  SpO2: 99% (22 Feb 2018 01:43) (94% - 100%)  CAPILLARY BLOOD GLUCOSE      POCT Blood Glucose.: 201 mg/dL (21 Feb 2018 22:19)  POCT Blood Glucose.: 131 mg/dL (21 Feb 2018 18:17)  POCT Blood Glucose.: 248 mg/dL (21 Feb 2018 12:29)  POCT Blood Glucose.: 150 mg/dL (21 Feb 2018 08:14)    I&O's Summary    20 Feb 2018 07:01  -  21 Feb 2018 07:00  --------------------------------------------------------  IN: 0 mL / OUT: 0 mL / NET: 0 mL    21 Feb 2018 07:01  -  22 Feb 2018 06:21  --------------------------------------------------------  IN: 720 mL / OUT: 0 mL / NET: 720 mL    PHYSICAL EXAM:  GENERAL: NAD, well-developed  HEAD:  Atraumatic, Normocephalic  EYES: EOMI, PERRLA, conjunctiva and sclera clear  NECK: Supple, No JVD  CHEST/LUNG: Clear to auscultation bilaterally; No wheeze  HEART: Regular rate and rhythm; Normal S1 S2, No murmurs, rubs, or gallops  ABDOMEN: Soft, Nontender, Nondistended; Bowel sounds present  EXTREMITIES:  2+ Peripheral Pulses, No clubbing, cyanosis, or edema  PSYCH: AAOx3  NEUROLOGY: non-focal  SKIN: No rashes or lesions    LABS:             13.0   10.05 )-----------( 354      ( 21 Feb 2018 07:35 )             39.1     02-21  137  |  97  |  20  ----------------------------<  158<H>  4.8   |  20<L>  |  0.55    Ca    8.6      21 Feb 2018 09:28  Phos  3.2     02-21  Mg     2.3     02-21    TPro  5.9<L>  /  Alb  2.6<L>  /  TBili  1.2  /  DBili  x   /  AST  157<H>  /  ALT  132<H>  /  AlkPhos  297<H>  02-21        RADIOLOGY & ADDITIONAL TESTS:    Imaging Personally Reviewed:    Consultant(s) Notes Reviewed:      Care Discussed with Consultants/Other Providers: Patient is a 62y old  Male who presents with a chief complaint of Weakness, N/V, (20 Feb 2018 03:03)    SUBJECTIVE / OVERNIGHT EVENTS:  Patient endorsed abdominal pain well controlled with morphine. Patient  Patient endorsed no bowel movement in the past 2 days. Patient denied fever, chills, nausea, vomiting, chest pain, diarrhea, sob.       MEDICATIONS  (STANDING):  dexamethasone     Tablet 4 milliGRAM(s) Oral two times a day  dextrose 5% + sodium chloride 0.45%. 1000 milliLiter(s) (75 mL/Hr) IV Continuous <Continuous>  dextrose 5% + sodium chloride 0.9%. 1000 milliLiter(s) (50 mL/Hr) IV Continuous <Continuous>  dextrose 5%. 1000 milliLiter(s) (50 mL/Hr) IV Continuous <Continuous>  dextrose 50% Injectable 12.5 Gram(s) IV Push once  dextrose 50% Injectable 25 Gram(s) IV Push once  dextrose 50% Injectable 25 Gram(s) IV Push once  docusate sodium 100 milliGRAM(s) Oral three times a day  insulin lispro (HumaLOG) corrective regimen sliding scale   SubCutaneous three times a day before meals  insulin lispro (HumaLOG) corrective regimen sliding scale   SubCutaneous at bedtime  pantoprazole    Tablet 40 milliGRAM(s) Oral before breakfast  senna 2 Tablet(s) Oral at bedtime    MEDICATIONS  (PRN):  dextrose Gel 1 Dose(s) Oral once PRN Blood Glucose LESS THAN 70 milliGRAM(s)/deciliter  glucagon  Injectable 1 milliGRAM(s) IntraMuscular once PRN Glucose LESS THAN 70 milligrams/deciliter  morphine   Solution 4 milliGRAM(s) Oral every 6 hours PRN Severe Pain (7 - 10)  morphine   Solution 2 milliGRAM(s) Oral every 6 hours PRN Moderate Pain (4 - 6)  ondansetron Injectable 4 milliGRAM(s) IV Push every 6 hours PRN Nausea and/or Vomiting      Vital Signs Last 24 Hrs  T(C): 36.4 (22 Feb 2018 01:43), Max: 36.9 (21 Feb 2018 13:48)  T(F): 97.5 (22 Feb 2018 01:43), Max: 98.5 (21 Feb 2018 13:48)  HR: 75 (22 Feb 2018 01:43) (75 - 88)  BP: 131/81 (22 Feb 2018 01:43) (111/73 - 131/81)  RR: 18 (22 Feb 2018 01:43) (18 - 18)  SpO2: 99% (22 Feb 2018 01:43) (94% - 100%)  CAPILLARY BLOOD GLUCOSE      POCT Blood Glucose.: 201 mg/dL (21 Feb 2018 22:19)  POCT Blood Glucose.: 131 mg/dL (21 Feb 2018 18:17)  POCT Blood Glucose.: 248 mg/dL (21 Feb 2018 12:29)  POCT Blood Glucose.: 150 mg/dL (21 Feb 2018 08:14)    I&O's Summary    20 Feb 2018 07:01  -  21 Feb 2018 07:00  --------------------------------------------------------  IN: 0 mL / OUT: 0 mL / NET: 0 mL    21 Feb 2018 07:01  -  22 Feb 2018 06:21  --------------------------------------------------------  IN: 720 mL / OUT: 0 mL / NET: 720 mL    PHYSICAL EXAM:  GENERAL: NAD, well-developed  HEAD:  Atraumatic, Normocephalic  EYES: EOMI, PERRLA, conjunctiva and sclera clear  NECK: Supple, No JVD  CHEST/LUNG: Decreased breath sounds on the right lower lobe, otherwise clear to auscultation.    HEART: Regular rate and rhythm; Normal S1 S2, No murmurs, rubs, or gallops  ABDOMEN: Soft, Nontender, Nondistended; Bowel sounds present  EXTREMITIES:  2+ Peripheral Pulses, No clubbing, cyanosis, or edema  PSYCH: AAOx3  NEUROLOGY: non-focal  SKIN: No rashes or lesions    LABS:             13.0   10.05 )-----------( 354      ( 21 Feb 2018 07:35 )             39.1     02-21  137  |  97  |  20  ----------------------------<  158<H>  4.8   |  20<L>  |  0.55    Ca    8.6      21 Feb 2018 09:28  Phos  3.2     02-21  Mg     2.3     02-21    TPro  5.9<L>  /  Alb  2.6<L>  /  TBili  1.2  /  DBili  x   /  AST  157<H>  /  ALT  132<H>  /  AlkPhos  297<H>  02-21               13.4   11.51 )-----------( 399      ( 22 Feb 2018 09:02 )             40.1     Hgb Trend: 13.4<--, 13.0<--, 13.9<--, 15.8<--  02-22    136  |  95<L>  |  20  ----------------------------<  169<H>  4.8   |  24  |  0.66    Ca    8.9      22 Feb 2018 09:02  Phos  3.2     02-22  Mg     2.2     02-22    TPro  5.9<L>  /  Alb  2.6<L>  /  TBili  1.2  /  DBili  x   /  AST  157<H>  /  ALT  132<H>  /  AlkPhos  297<H>  02-21    Creatinine Trend: 0.66<--, 0.55<--, 0.70<--, 0.95<--, 1.03<--, 0.77<--  PT/INR - ( 22 Feb 2018 08:11 )   PT: 13.0 sec;   INR: 1.20 ratio    PTT - ( 22 Feb 2018 08:11 )  PTT:26.2 sec      RADIOLOGY & ADDITIONAL TESTS:    Imaging Personally Reviewed:    Consultant(s) Notes Reviewed:  Gastroenterology, Palliative care     Care Discussed with Consultants/Other Providers:

## 2018-02-22 NOTE — DISCHARGE NOTE ADULT - CARE PROVIDER_API CALL
Carlos A Devlin), [facility] Medicine; Internal Medicine; Medical Oncology  [address] [place] [address]  Phone: [phone]  Fax: [phone]

## 2018-02-22 NOTE — DIETITIAN INITIAL EVALUATION ADULT. - ORAL INTAKE PTA
Pt reports very poor appetite and po intake for the past month PTA. Pt only able to tolerate liquids and will have soups and Unjury protein supplement (110kcal,21gm protein) in-between meals.  Pt took vitamin D3 PTA. NKFA./poor

## 2018-02-22 NOTE — PROGRESS NOTE ADULT - SUBJECTIVE AND OBJECTIVE BOX
INTERVAL HPI/OVERNIGHT EVENTS:  Patient reports feeling better today. Was ambulating on the unit. States his pain and nausea are well managed with Morphine and Zofran. Patient NPO today for esophageal stent placement.       Allergies    No Known Allergies    Intolerances      ADVANCE DIRECTIVES:    Full code  MOLST [ ] YES [x ] NO     MEDICATIONS  (STANDING):  dexamethasone     Tablet 4 milliGRAM(s) Oral two times a day  dextrose 5% + sodium chloride 0.9%. 1000 milliLiter(s) (50 mL/Hr) IV Continuous <Continuous>  dextrose 5%. 1000 milliLiter(s) (50 mL/Hr) IV Continuous <Continuous>  dextrose 50% Injectable 12.5 Gram(s) IV Push once  dextrose 50% Injectable 25 Gram(s) IV Push once  dextrose 50% Injectable 25 Gram(s) IV Push once  docusate sodium 100 milliGRAM(s) Oral three times a day  insulin lispro (HumaLOG) corrective regimen sliding scale   SubCutaneous three times a day before meals  insulin lispro (HumaLOG) corrective regimen sliding scale   SubCutaneous at bedtime  pantoprazole    Tablet 40 milliGRAM(s) Oral before breakfast  senna 2 Tablet(s) Oral at bedtime    MEDICATIONS  (PRN):  dextrose Gel 1 Dose(s) Oral once PRN Blood Glucose LESS THAN 70 milliGRAM(s)/deciliter  glucagon  Injectable 1 milliGRAM(s) IntraMuscular once PRN Glucose LESS THAN 70 milligrams/deciliter  morphine   Solution 4 milliGRAM(s) Oral every 6 hours PRN Severe Pain (7 - 10)  morphine   Solution 2 milliGRAM(s) Oral every 6 hours PRN Moderate Pain (4 - 6)  ondansetron Injectable 4 milliGRAM(s) IV Push every 6 hours PRN Nausea and/or Vomiting      PRESENT SYMPTOMS:  Source: [x] Patient   [ ] Family   [ ] Team     Pain:                        [ ] No [x ] Yes             [x ] Mild [ ] Moderate [ ] Severe    Onset -1 month  Location -abdominal  Duration -constant  Character -aching/gnawing  Alleviating/Aggravating -worsens with touch; relieved by Morphine  Radiation -right flank  Timing -constant    Dyspnea:                [ ] No [ x] Yes             [ x] Mild [ ] Moderate [ ] Severe    Anxiety:                  [ x] No [ ] Yes             [ ] Mild [ ] Moderate [ ] Severe    Fatigue:                  [ ] No [ x] Yes             [ ] Mild [x ] Moderate [ ] Severe    Nausea:                  [ ] No [ x] Yes             [ ] Mild [x ] Moderate [ ] Severe    Loss of appetite:   [ ] No [ x] Yes             [ ] Mild [ ] Moderate [ x] Severe    Constipation:        [ ] No [ x] Yes             [ ] Mild [x ] Moderate [ ] Severe    Other Symptoms:  [ x] All other review of systems negative   [ ] Unable to obtain due to poor mentation     Karnofsky Performance Score/Palliative Performance Status Version 2:   50%    PHYSICAL EXAM:  Vital Signs Last 24 Hrs  T(C): 36.3 (22 Feb 2018 06:44), Max: 36.9 (21 Feb 2018 13:48)  T(F): 97.4 (22 Feb 2018 06:44), Max: 98.5 (21 Feb 2018 13:48)  HR: 76 (22 Feb 2018 06:44) (75 - 88)  BP: 142/88 (22 Feb 2018 06:44) (111/73 - 142/88)  BP(mean): --  RR: 18 (22 Feb 2018 06:44) (18 - 18)  SpO2: 99% (22 Feb 2018 06:44) (94% - 100%) I&O's Summary    21 Feb 2018 07:01  -  22 Feb 2018 07:00  --------------------------------------------------------  IN: 1320 mL / OUT: 0 mL / NET: 1320 mL        General:  [ x] Alert  [ x] Oriented x 3     [ ] Lethargic  [ ] Agitated   [ ] Cachexia   [ ] Unarousable  [ x] Verbal  [ ] Non-Verbal    HEENT:  [ ] Normal   [x ] Dry mouth   [ ] ET Tube    [ ] Trach  [ ] Oral lesions    Lungs:   [x ] Clear [ ] Tachypnea  [ ] Audible excessive secretions   [ ] Rhonchi        [ ] Right [ ] Left [ ] Bilateral  [ ] Crackles        [ ] Right [ ] Left [ ] Bilateral  [ ] Wheezing     [ ] Right [ ] Left [ ] Bilateral    Cardiovascular:  [ x] Regular [ ] Irregular [ ] Tachycardia   [ ] Bradycardia  [ ] Murmur [ ] Other    Abdomen:  [x] Soft  [x ] Distended   [x ] +BS  [ ] Non tender [x ] Tender  [ ]PEG   [ ]OGT/ NGT   Last BM: 2/20/2018      Genitourinary: [x ] Normal [ ] Incontinent   [ ] Oliguria/Anuria   [ ] Tovar    Musculoskeletal:  [ ] Normal   [x] Weakness  [ ] Bedbound/Wheelchair bound [ ] Edema    Neurological: [x ] No focal deficits  [ ] Cognitive impairment  [ ] Dysphagia [ ] Dysarthria [ ] Paresis [ ] Other     Skin: [x ] Normal   [ ] Pressure ulcer(s)                  [ ] Rash    LABS:                        13.4   11.51 )-----------( 399      ( 22 Feb 2018 09:02 )             40.1     02-22    136  |  95<L>  |  20  ----------------------------<  169<H>  4.8   |  24  |  0.66    Ca    8.9      22 Feb 2018 09:02  Phos  3.2     02-22  Mg     2.2     02-22    TPro  5.9<L>  /  Alb  2.6<L>  /  TBili  1.2  /  DBili  x   /  AST  157<H>  /  ALT  132<H>  /  AlkPhos  297<H>  02-21    PT/INR - ( 22 Feb 2018 08:11 )   PT: 13.0 sec;   INR: 1.20 ratio         PTT - ( 22 Feb 2018 08:11 )  PTT:26.2 sec      Shock: [ ] Septic [ ] Cardiogenic [ ] Neurologic [ ] Hypovolemic  Vasopressors x   Inotrophs x     Oral Intake: [ ] Unable/mouth care only [ ] Minimal [ ] Moderate [ x] Full Capability    Diet: [x ] NPO (for procedure today) [ ] Tube feeds [ ] TPN [ ] Other     RADIOLOGY & ADDITIONAL STUDIES:  PROCEDURE DATE:  02/19/2018            INTERPRETATION:  CLINICAL INFORMATION: Leukocytosis. Esophageal cancer.    COMPARISON: Abdominopelvic CT from 2/6/2018    PROCEDURE:   CT of the Abdomen and Pelvis was performed with intravenous contrast.   Intravenous contrast: 90 ml Omnipaque 350. 10 ml discarded.  Oral contrast: None.  Sagittal and coronal reformats were performed.    FINDINGS:    LOWER CHEST: Diffuse pulmonary metastases and partially imaged   mediastinal and hilar lymphadenopathy. Irregular soft tissue thickening   of the distal esophagus and gastroesophageal junction, likely   corresponding to provided history of esophageal cancer. Enlarged   paraesophageal lymph node measuring 1.3 cm in short axis, unchanged.    LIVER: Diffuse hepatic metastatic disease.  BILE DUCTS: Normal caliber.  GALLBLADDER: Within normal limits.  SPLEEN: Within normal limits.  PANCREAS: Within normal limits.  ADRENALS: 1.1 cm left adrenal nodule. Right adrenal gland within normal   limits.  KIDNEYS/URETERS: Within normal limits.    BLADDER: Within normal limits.  REPRODUCTIVE ORGANS: The prostate is enlarged.    BOWEL: No bowel obstruction. Appendix is normal.  PERITONEUM: No pneumoperitoneum.  VESSELS:  Within normal limits.  RETROPERITONEUM: Multiple enlarged gastrohepatic lymph nodes, the largest   measuring up to 2.0 x 1.7 cm (series 2, image 30), previously 1.8 x 1.5   cm.    ABDOMINAL WALL: Numerous intra-abdominal wall injection granulomas.   Scattered subcutaneous nodules in the right chest and ventral abdominal   wall, suspicious for metastases.  BONES: Degenerative changes of the thoracic and lumbar spine.   Age-indeterminate, nondisplaced left L2 transverse process fracture. No   suspicious lytic or blastic skeletal lesions.    IMPRESSION: No significant interval change since 2/6/2018. Distal   esophageal mass, diffuse pulmonary, mediastinal, hilar and hepatic   metastatic disease, not significantly changed since 2/6/2018. Scattered   subcutaneous nodules in the right chest and ventral abdominal wall and   left adrenal nodule, suspicious for metastases.      REFERRALS:   [ ] Chaplaincy  [ ] Hospice  [ ] Child Life  [ ] Social Work  [ ] Case management [ ] Holistic Therapy

## 2018-02-22 NOTE — PROGRESS NOTE ADULT - ASSESSMENT
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 CTAP-no acute infectious source identified, CXR-no signs of pna.

## 2018-02-22 NOTE — PROGRESS NOTE ADULT - PROBLEM SELECTOR PLAN 6
Palliative team called for goals of care discussion. Patient identified his wife Chana as his HCP. He is a full code at this time and wants to pursue any and all treatments he can for his diagnosis. Patient was completely independent, feeling well and working full time up until approximately one month ago when his symptoms began. He feels that if his nutritional status can be improved, he will have the ability to receive the treatment he desires. He is accepting of treatment for symptom management of his pain, nausea, and weakness. Awaits plan of care for disease modifying therapies.  Oncologist ,  Dr. Martinez. Palliative team called for goals of care discussion. Patient identified his wife Chana as his HCP. He is a full code at this time and wants to pursue any and all treatments he can for his diagnosis. Patient was completely independent, feeling well and working full time up until approximately one month ago when his symptoms began. He feels that if his nutritional status can be improved, he will have the ability to receive the treatment he desires. He is accepting of treatment for symptom management of his pain, nausea, and weakness. Awaits plan of care for disease modifying therapies.  Oncologist ,  Dr. Martinez.  Please ensure that patient's chosen pharmacy has full quantity (7 days) of prescribed opioid and/or naloxone prior to discharge.  Signing off, please reconsult as needed.

## 2018-02-22 NOTE — DISCHARGE NOTE ADULT - CARE PLAN
Principal Discharge DX:	Esophageal cancer  Assessment and plan of treatment:	You have cancer of the esophagus with metastasis. This cancer caused your esophagus to be narrow not allow for good ingestion of food.  Secondary Diagnosis:	Diabetes  Secondary Diagnosis:	Elevated LFTs Principal Discharge DX:	Esophageal cancer  Goal:	Follow up with gastroenterologist within two weeks from discharge.  Assessment and plan of treatment:	You have cancer of the esophagus with metastasis. This cancer caused your esophagus to be narrow not allow food to come down appropriately. You were seen by gastroenterologist who placed a stent in your esophagus on 2/22/2018. You also will follow up with your oncologist for management of your underlying cancer. You also were treated with morphine for your pain.  Secondary Diagnosis:	Diabetes  Goal:	Follow up with endocrinologist within two weeks from discharge.  Assessment and plan of treatment:	You have know diabetes mellitus. You were treated with insulin while in the hospital. You will continue on your medications. Monitor your sugars closely through out the day. Monitor for signs of hypoglycemia, like shaking and sweating. You will continue on your diet as recommended by the nutritionist  Secondary Diagnosis:	Elevated LFTs  Goal:	Follow up with gastroenterologist within two weeks from discharge.  Assessment and plan of treatment:	You have elevated liver enzymes likely due to your metastasis to the liver. Please avoid use of medications or substances that may harm your liver. If you noticed yellowing of your eyes or skin, please see a doctor. Principal Discharge DX:	Esophageal cancer  Goal:	Follow up with gastroenterologist within two weeks from discharge.  Assessment and plan of treatment:	You have cancer of the esophagus with metastasis. This cancer caused your esophagus to be narrow not allow food to come down appropriately. You were seen by gastroenterologist who placed a stent in your esophagus on 2/22/2018. You also will follow up with your oncologist for management of your underlying cancer. You also were treated with morphine for your pain.  Secondary Diagnosis:	Diabetes  Goal:	Follow up with endocrinologist within two weeks from discharge.  Assessment and plan of treatment:	You have know diabetes mellitus. You were treated with insulin while in the hospital. You will continue on your medications. Monitor your sugars closely through out the day. Monitor for signs of hypoglycemia, like shaking and sweating. You will continue on your diet as recommended by the nutritionist  Secondary Diagnosis:	Elevated LFTs  Goal:	Follow up with gastroenterologist within two weeks from discharge.  Assessment and plan of treatment:	You have elevated liver enzymes likely due to your metastasis to the liver. Please avoid use of medications or substances that may harm your liver. Principal Discharge DX:	Esophageal cancer  Goal:	Follow up with gastroenterologist within two weeks from discharge.  Assessment and plan of treatment:	You have cancer of the esophagus with metastasis. This cancer caused your esophagus to be narrow not allow food to come down appropriately. You were seen by gastroenterologist who placed a stent in your esophagus on 2/22/2018. You were managed with dilaudid for pain. Your liver disease worsened and oncology stopped offering chemotherapy. You were seen by the palliative care on 3/1/18, which recommended DNR/DNI, a PCA pump through PICC line, and transfer to the palliative care unit.  Secondary Diagnosis:	Diabetes  Goal:	Follow up with endocrinologist within two weeks from discharge.  Assessment and plan of treatment:	You have know diabetes mellitus. You were treated with insulin while in the hospital. You will continue on your diet as recommended by the nutritionist.  Secondary Diagnosis:	Elevated LFTs  Goal:	Follow up with gastroenterologist within two weeks from discharge.  Assessment and plan of treatment:	You have elevated liver enzymes likely due to your metastasis to the liver. Please avoid use of medications or substances that may harm your liver.

## 2018-02-22 NOTE — PROGRESS NOTE ADULT - PROBLEM SELECTOR PLAN 2
- In setting of malignancy w/ metastatic disease.    - Nutrition recs one tolerating PO  - IR does not have good windows for PEG tube given hepatomegaly. Likely has to have endoscopic placement. - In setting of malignancy w/ metastatic disease.    - Nutrition recs one tolerating PO  - Pending esophageal placement today.

## 2018-02-22 NOTE — DIETITIAN INITIAL EVALUATION ADULT. - NS AS NUTRI INTERV MEDICAL AND FOOD SUPPLEMENTS
2 Nocarb Prosource (additional 120kcal, 30gm protein) Glucerna, 3 per day (provides additional 660cal, 30gm protein) to supplement intake and aid in maintenance of lean muscle mass

## 2018-02-22 NOTE — DISCHARGE NOTE ADULT - PATIENT PORTAL LINK FT
You can access the [facility] Patient Portal, offered by [facility], by registering with the following website: [email or web address]

## 2018-02-22 NOTE — DIETITIAN INITIAL EVALUATION ADULT. - PHYSICAL APPEARANCE
other (specify)/Thin; Nutrition Focused Physical Assessment performed: pt noted with moderate muscle wasting of temples, deltoid, calf; (severe)clavicle, moderate fat depletion of orbital region, triceps/biceps, ribs.

## 2018-02-22 NOTE — PROGRESS NOTE ADULT - PROBLEM SELECTOR PLAN 1
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 CTAP shows no change from previous: Distal esophageal mass, diffuse pulmonary, mediastinal, hilar and hepatic metastatic disease  -continue w/ decadron  - Gastroenterology recs appreciated, EGD with stent placement.   - Onco recs appreciated, pending nutritional status improvement.   - C/w with Oral Morphine as needed, tolerating well.

## 2018-02-22 NOTE — PROGRESS NOTE ADULT - SUBJECTIVE AND OBJECTIVE BOX
Pre-Endoscopy Evaluation      Referring Physician:  Dr. STEVIE Hameed                                  Procedure: Egd/Esophageal stent placement    Indication for Procedure: Met. 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 He p/w reduced oral intake and appetite.    Sedation by Anesthesia [X]    PAST MEDICAL & SURGICAL HISTORY:  Esophageal cancer  TIA (transient ischemic attack)  Diabetes  H/O prior ablation treatment: for wpw  Status post placement of implantable loop recorder      PMH of Gastroparesis [ ]  Gastric Surgery [ ]  Gastric Outlet Obstruction [ ]    Allergies:    No Known Allergies    Intolerances:    Latex allergy: [ ] yes [X] no    Medications:MEDICATIONS  (STANDING):  dexamethasone     Tablet 4 milliGRAM(s) Oral two times a day  dextrose 5% + sodium chloride 0.9%. 1000 milliLiter(s) (50 mL/Hr) IV Continuous <Continuous>  dextrose 5%. 1000 milliLiter(s) (50 mL/Hr) IV Continuous <Continuous>  dextrose 50% Injectable 12.5 Gram(s) IV Push once  dextrose 50% Injectable 25 Gram(s) IV Push once  dextrose 50% Injectable 25 Gram(s) IV Push once  docusate sodium 100 milliGRAM(s) Oral three times a day  insulin lispro (HumaLOG) corrective regimen sliding scale   SubCutaneous three times a day before meals  insulin lispro (HumaLOG) corrective regimen sliding scale   SubCutaneous at bedtime  pantoprazole    Tablet 40 milliGRAM(s) Oral before breakfast  polyethylene glycol 3350 17 Gram(s) Oral once  senna 2 Tablet(s) Oral at bedtime    MEDICATIONS  (PRN):  dextrose Gel 1 Dose(s) Oral once PRN Blood Glucose LESS THAN 70 milliGRAM(s)/deciliter  glucagon  Injectable 1 milliGRAM(s) IntraMuscular once PRN Glucose LESS THAN 70 milligrams/deciliter  morphine   Solution 5 milliGRAM(s) Oral every 4 hours PRN moderate severe pain  morphine   Solution 2 milliGRAM(s) Oral every 4 hours PRN mild mod pain  ondansetron Injectable 4 milliGRAM(s) IV Push every 6 hours PRN Nausea and/or Vomiting      Smoking: [ ] yes  [X] no    AICD/PPM: [ ] yes   [X] no    Pertinent lab data:                        13.4   . )-----------( 399      ( 2018 09:02 )             40.1         136  |  95<L>  |  20  ----------------------------<  169<H>  4.8   |  24  |  0.66    Ca    8.9      2018 09:02  Phos  3.2       Mg     2.2         TPro  5.9<L>  /  Alb  2.6<L>  /  TBili  1.2  /  DBili  x   /  AST  157<H>  /  ALT  132<H>  /  AlkPhos  297<H>      PT/INR - ( 2018 08:11 )   PT: 13.0 sec;   INR: 1.20 ratio      PTT - ( 2018 08:11 )  PTT:26.2 sec        Physical Examination:  Daily     Daily Weight in k.5 (2018 08:51)  Vital Signs Last 24 Hrs  T(C): 36.8 (2018 11:22), Max: 36.9 (2018 13:48)  T(F): 98.3 (2018 11:22), Max: 98.5 (2018 13:48)  HR: 80 (2018 11:22) (75 - 88)  BP: 118/70 (2018 11:22) (111/73 - 142/88)  BP(mean): --  RR: 18 (2018 11:22) (18 - 18)  SpO2: 98% (2018 11:22) (94% - 100%)    Drug Dosing Weight  Height (cm): 154.94 (2018 19:45)  Weight (kg): 53.5 (2018 19:45)  BMI (kg/m2): 22.3 (2018 19:45)  BSA (m2): 1.51 (2018 19:45)    Constitutional: NAD  Neck:  No JVD  Respiratory: CTAB/L  Cardiovascular: S1 and S2  Gastrointestinal: BS+, soft, NT/ND  Extremities: No peripheral edema  Neurological: A/O x 3, no focal deficits  : No Tovar  Skin: No rashes    Comments:    ASA Class: I [ ]  II [ ]  III [x  IV [ ]    The patient is a suitable candidate for the planned procedure unless box checked [ ]  No, explain:

## 2018-02-22 NOTE — PROGRESS NOTE ADULT - ASSESSMENT
62 year old man with history of Type II DM, WPW (s/p ablation 2016), TIA, and recent diagnosis of poorly differentiated esophageal adenocarcinoma with mets to liver, lung, and brain admitted on 2/20 for severe dehydration and nausea. Symptoms improving and patient scheduled for esophageal stent today.

## 2018-02-22 NOTE — DIETITIAN INITIAL EVALUATION ADULT. - PROBLEM SELECTOR PLAN 3
-AG 32 most likely in setting of elevated lactic acid, most likely Type B in setting of malignancy and starvation/fasting ketosis evidenced by U/A showing + ketones, and eleavated beta-hydroxybutyrate, unlikely DKA as glucose improving  -suspect AG will improve, now patient s/p 3L fluids, lactate has improved from 10.9 to 4.8, will continue w/ D5+0.45NS@ 75cc's/hr  -patient most likely w/ starvation ketosis in setting of malnutrition, minimal oral/fluid intake  -nutrition consult  -repeat CMP, will need to monitor for re-feeding syndrome, will check electrolytes

## 2018-02-22 NOTE — CHART NOTE - NSCHARTNOTEFT_GEN_A_CORE
Upon Nutritional Assessment by the Registered Dietitian your patient was determined to meet criteria / has evidence of the following diagnosis/diagnoses:          [ ]  Mild Protein Calorie Malnutrition        [ ]  Moderate Protein Calorie Malnutrition        [X ] Severe Protein Calorie Malnutrition        [ ] Unspecified Protein Calorie Malnutrition        [ ] Underweight / BMI <19        [ ] Morbid Obesity / BMI > 40      Findings as based on:  [ X] Comprehensive nutrition assessment   [ X] Nutrition Focused Physical Exam: pt noted with moderate muscle wasting of temples, deltoid, calf; (severe)clavicle, moderate fat depletion of orbital region, triceps/biceps, ribs.  [X ] Other: 13% wt loss x 1 month, suboptimal po intake x1 month      Nutrition Plan/Recommendations:    1. When medically feasible, reinitiate full liquid diet as tolerated  2. Provide 2 Nocarb Prosource (additional 120kcal, 30gm protein) , Glucerna, 3 per day (provides additional 660cal, 30gm protein) with diet advancement  to supplement intake and aid in maintenance of lean muscle mass  3. Recommend MVI and vitamin D3  4. RD to follow up with completion of 3 day calorie count (2/22-2/24)      PROVIDER Section:     By signing this assessment you are acknowledging and agree with the diagnosis/diagnoses assigned by the Registered Dietitian    Comments:

## 2018-02-22 NOTE — PROGRESS NOTE ADULT - PROBLEM SELECTOR PLAN 2
Diagnosed metastatic esophageal cancer 2/18. Patient and spouse would like to begin treatment. Patient being followed by onc team and patient states they have begun a plan for chemotherapy.

## 2018-02-22 NOTE — DIETITIAN INITIAL EVALUATION ADULT. - NS AS NUTRI INTERV MEALS SNACK
When medically feasible, reinitiate full liquid diet as tolerated. Encourage po intake and sipping on supplements in-between meals to optimize intake during mealtime. When medically feasible, reinitiate consistent CHO full liquid diet as tolerated. Encourage po intake and sipping on supplements in-between meals to optimize intake during mealtime.

## 2018-02-22 NOTE — PROGRESS NOTE ADULT - PROBLEM SELECTOR PLAN 5
Well controlled at this time with Zofran prn. Plan for patient to be discharged with Zofran. Please discharge patient home on Zofran 4 mg every 6 hours as needed for nausea/vomiting.

## 2018-02-22 NOTE — PROGRESS NOTE ADULT - ATTENDING COMMENTS
Per discussion with Onc and GI last night, plan changed from PEG to esophageal stenting.   Patient to undergo procedure today. Has been NPO.  Diet to be advanced per GI post procedure.  Pain control.

## 2018-02-22 NOTE — DIETITIAN INITIAL EVALUATION ADULT. - PROBLEM SELECTOR PLAN 4
poorly differentiated adenocarcinoma of the esophagus with extensive liver, pulm, brain mets  -CTAP shows no change from previous: Distal   esophageal mass, diffuse pulmonary, mediastinal, hilar and hepatic metastatic disease  -consult onc in AM  -consult GI in AM  -continue w/ decadron

## 2018-02-22 NOTE — PROGRESS NOTE ADULT - PROBLEM SELECTOR PLAN 4
Patient reports one month of constant, aching abdominal pain. Good effect with Morphine 4 mg oral. Will increase dose to every 4 hours as patient feels the medication is not lasting for the 6 hours.

## 2018-02-22 NOTE — DIETITIAN INITIAL EVALUATION ADULT. - OTHER INFO
Nutrition consult received for assessment/calorie count. Per chart, pt newly diagnosed 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 Pt currently NPO this morning for EGD. Per RN, pt barely had anything to drink yesterday. Pt reports poor po intake continues in house, pt inquired about different supplements. Pt amenable to 2 Nocarb Prosource (additional 120kcal, 30gm protein) and chocolate Glucerna, 3 per day (provides additional 660cal, 30gm protein) when diet advanced. Pt reports weight loss started 1 month ago, pt has only been able to tolerate liquids and feels nauseous after eating. Pt with 17 pound weight loss x 1 month. Pt denies to have any GI distress at this time. Pt declined to have any additional nutrition-related questions/concerns at this time. Pt made aware RD remains available. Per discussion with team, calorie count to be initiated today, IR unable to do PEG- if needed surgery  for PEG placement.

## 2018-02-22 NOTE — DISCHARGE NOTE ADULT - PLAN OF CARE
You have cancer of the esophagus with metastasis. This cancer caused your esophagus to be narrow not allow for good ingestion of food. Follow up with gastroenterologist within two weeks from discharge. You have cancer of the esophagus with metastasis. This cancer caused your esophagus to be narrow not allow food to come down appropriately. You were seen by gastroenterologist who placed a stent in your esophagus on 2/22/2018. You also will follow up with your oncologist for management of your underlying cancer. You also were treated with morphine for your pain. Follow up with endocrinologist within two weeks from discharge. You have know diabetes mellitus. You were treated with insulin while in the hospital. You will continue on your medications. Monitor your sugars closely through out the day. Monitor for signs of hypoglycemia, like shaking and sweating. You will continue on your diet as recommended by the nutritionist You have elevated liver enzymes likely due to your metastasis to the liver. Please avoid use of medications or substances that may harm your liver. If you noticed yellowing of your eyes or skin, please see a doctor. You have elevated liver enzymes likely due to your metastasis to the liver. Please avoid use of medications or substances that may harm your liver. You have cancer of the esophagus with metastasis. This cancer caused your esophagus to be narrow not allow food to come down appropriately. You were seen by gastroenterologist who placed a stent in your esophagus on 2/22/2018. You were managed with dilaudid for pain. Your liver disease worsened and oncology stopped offering chemotherapy. You were seen by the palliative care on 3/1/18, which recommended DNR/DNI, a PCA pump through PICC line, and transfer to the palliative care unit. You have know diabetes mellitus. You were treated with insulin while in the hospital. You will continue on your diet as recommended by the nutritionist.

## 2018-02-22 NOTE — DIETITIAN INITIAL EVALUATION ADULT. - NS AS NUTRI INTERV COLLABORAT
Malnutrition alert placed in chart Malnutrition alert placed in chart. Calorie count to be initiated today at lunch, discussed with RN.

## 2018-02-22 NOTE — DISCHARGE NOTE ADULT - ADDITIONAL INSTRUCTIONS
Please follow up with your Oncologist Dr. Ansari within two weeks from discharge.   Please follow up with your Gastroenterologist within two weeks from discharge.   Please follow up with your Endocrinologist within two weeks from discharge. Please follow up with your Oncologist Dr. Ansari.  Please follow up with your Gastroenterologist.  Please follow up with your Endocrinologist.

## 2018-02-22 NOTE — DISCHARGE NOTE ADULT - HOSPITAL COURSE
62M h/o TIA (1/19 w/ no focal deficits), WPW s/p ablation (7/2016), DM2, BPH, newly diagnosed metastatic poorly differentiated adenocarcinoma of the esophagus with extensive liver, pulm, brain mets, presenting w/ c/o of progressive weakness, N/V, and loss of appetite x 2 days. Patient was found with SIRS w/ increased lactate, leukocytosis, and tachycardia, Anion-Gap Metabolic Acidosis most likely in setting of malignancy and starvation ketosis. No signs of infection were found on imaging and labs. CT scan showed extensive metastases, and a lesion in your esophagus which is likely obstructing food passage. Pt was treated with adequate fluid hydration and started on liquid diet, and was treated with oral morphine for the pain. Pt was evaluated by gastroenterology, and had a esophageal stent placed on 2/22/2018. Patient was seen by a nutritionist who provided recommendations for nutritional goals. 62M h/o TIA (1/19 w/ no focal deficits), WPW s/p ablation (7/2016), DM2, BPH, newly diagnosed metastatic poorly differentiated adenocarcinoma of the esophagus with extensive liver, pulm, brain mets, presenting w/ c/o of progressive weakness, N/V, and loss of appetite x 2 days. Patient was found with SIRS w/ increased lactate, leukocytosis, and tachycardia, Anion-Gap Metabolic Acidosis most likely in setting of malignancy and starvation ketosis. No signs of infection were found on imaging and labs. CT scan showed extensive metastases, and a lesion in your esophagus which is likely obstructing food passage. Pt was treated with adequate fluid hydration and started on liquid diet, and was treated with oral morphine for the pain. Pt was evaluated by gastroenterology, and had a esophageal stent placed on 2/22/2018. Patient was seen by a nutritionist who provided recommendations for nutritional goals. Patient with epigastric pain controlled with dilaudid, and nausea controlled with reglan, zofran and/or ativan. Patient had increasing LFTs and bilirubin, underwent abdominal ultrasound which showed no bile duct dilation. Oncology saw patient and given new worsening liver function, patient is not a candidate for chemotherapy. 62M h/o TIA (1/19 w/ no focal deficits), WPW s/p ablation (7/2016), DM2, BPH, newly diagnosed metastatic poorly differentiated adenocarcinoma of the esophagus with extensive liver, pulm, brain mets, presenting w/ c/o of progressive weakness, N/V, and loss of appetite x 2 days. Patient was found with SIRS w/ increased lactate, leukocytosis, and tachycardia, Anion-Gap Metabolic Acidosis most likely in setting of malignancy and starvation ketosis. No signs of infection were found on imaging and labs. CT scan showed extensive metastases, and a lesion in your esophagus which is likely obstructing food passage. Pt was treated with adequate fluid hydration and started on liquid diet, and was treated with oral morphine for the pain. Pt was evaluated by gastroenterology, and had a esophageal stent placed on 2/22/2018. Patient was seen by a nutritionist who provided recommendations for nutritional goals. Patient with epigastric pain controlled with dilaudid, and nausea controlled with reglan, zofran and/or ativan. Patient had increasing LFTs and bilirubin, underwent abdominal ultrasound which showed no bile duct dilation. Oncology saw patient and given new worsening liver function, patient is not a candidate for chemotherapy. Patient was seen by the palliative care on 3/1/18, which recommended DNR/DNI, comfort measures, a PCA pump through PICC line, and transfer to the palliative care unit.

## 2018-02-23 DIAGNOSIS — Z29.9 ENCOUNTER FOR PROPHYLACTIC MEASURES, UNSPECIFIED: ICD-10-CM

## 2018-02-23 LAB
ANION GAP SERPL CALC-SCNC: 19 MMOL/L — HIGH (ref 5–17)
ANION GAP SERPL CALC-SCNC: 20 MMOL/L — HIGH (ref 5–17)
BASOPHILS # BLD AUTO: 0.01 K/UL — SIGNIFICANT CHANGE UP (ref 0–0.2)
BASOPHILS NFR BLD AUTO: 0.1 % — SIGNIFICANT CHANGE UP (ref 0–2)
BUN SERPL-MCNC: 20 MG/DL — SIGNIFICANT CHANGE UP (ref 7–23)
BUN SERPL-MCNC: 22 MG/DL — SIGNIFICANT CHANGE UP (ref 7–23)
CALCIUM SERPL-MCNC: 8.9 MG/DL — SIGNIFICANT CHANGE UP (ref 8.4–10.5)
CALCIUM SERPL-MCNC: 9.5 MG/DL — SIGNIFICANT CHANGE UP (ref 8.4–10.5)
CHLORIDE SERPL-SCNC: 95 MMOL/L — LOW (ref 96–108)
CHLORIDE SERPL-SCNC: 96 MMOL/L — SIGNIFICANT CHANGE UP (ref 96–108)
CO2 SERPL-SCNC: 19 MMOL/L — LOW (ref 22–31)
CO2 SERPL-SCNC: 22 MMOL/L — SIGNIFICANT CHANGE UP (ref 22–31)
CREAT SERPL-MCNC: 0.61 MG/DL — SIGNIFICANT CHANGE UP (ref 0.5–1.3)
CREAT SERPL-MCNC: 0.76 MG/DL — SIGNIFICANT CHANGE UP (ref 0.5–1.3)
EOSINOPHIL # BLD AUTO: 0.07 K/UL — SIGNIFICANT CHANGE UP (ref 0–0.5)
EOSINOPHIL NFR BLD AUTO: 0.5 % — SIGNIFICANT CHANGE UP (ref 0–6)
GLUCOSE SERPL-MCNC: 135 MG/DL — HIGH (ref 70–99)
GLUCOSE SERPL-MCNC: 242 MG/DL — HIGH (ref 70–99)
HCT VFR BLD CALC: 45.6 % — SIGNIFICANT CHANGE UP (ref 39–50)
HGB BLD-MCNC: 15 G/DL — SIGNIFICANT CHANGE UP (ref 13–17)
IMM GRANULOCYTES NFR BLD AUTO: 0.8 % — SIGNIFICANT CHANGE UP (ref 0–1.5)
LYMPHOCYTES # BLD AUTO: 1.31 K/UL — SIGNIFICANT CHANGE UP (ref 1–3.3)
LYMPHOCYTES # BLD AUTO: 9.6 % — LOW (ref 13–44)
MAGNESIUM SERPL-MCNC: 2.1 MG/DL — SIGNIFICANT CHANGE UP (ref 1.6–2.6)
MCHC RBC-ENTMCNC: 26.9 PG — LOW (ref 27–34)
MCHC RBC-ENTMCNC: 32.9 GM/DL — SIGNIFICANT CHANGE UP (ref 32–36)
MCV RBC AUTO: 81.7 FL — SIGNIFICANT CHANGE UP (ref 80–100)
MONOCYTES # BLD AUTO: 1.37 K/UL — HIGH (ref 0–0.9)
MONOCYTES NFR BLD AUTO: 10.1 % — SIGNIFICANT CHANGE UP (ref 2–14)
NEUTROPHILS # BLD AUTO: 10.71 K/UL — HIGH (ref 1.8–7.4)
NEUTROPHILS NFR BLD AUTO: 78.9 % — HIGH (ref 43–77)
PHOSPHATE SERPL-MCNC: 3.3 MG/DL — SIGNIFICANT CHANGE UP (ref 2.5–4.5)
PLATELET # BLD AUTO: 415 K/UL — HIGH (ref 150–400)
POTASSIUM SERPL-MCNC: 4.8 MMOL/L — SIGNIFICANT CHANGE UP (ref 3.5–5.3)
POTASSIUM SERPL-MCNC: 5.5 MMOL/L — HIGH (ref 3.5–5.3)
POTASSIUM SERPL-SCNC: 4.8 MMOL/L — SIGNIFICANT CHANGE UP (ref 3.5–5.3)
POTASSIUM SERPL-SCNC: 5.5 MMOL/L — HIGH (ref 3.5–5.3)
RBC # BLD: 5.58 M/UL — SIGNIFICANT CHANGE UP (ref 4.2–5.8)
RBC # FLD: 17.2 % — HIGH (ref 10.3–14.5)
SODIUM SERPL-SCNC: 135 MMOL/L — SIGNIFICANT CHANGE UP (ref 135–145)
SODIUM SERPL-SCNC: 136 MMOL/L — SIGNIFICANT CHANGE UP (ref 135–145)
WBC # BLD: 13.58 K/UL — HIGH (ref 3.8–10.5)
WBC # FLD AUTO: 13.58 K/UL — HIGH (ref 3.8–10.5)

## 2018-02-23 PROCEDURE — 99233 SBSQ HOSP IP/OBS HIGH 50: CPT

## 2018-02-23 PROCEDURE — 93010 ELECTROCARDIOGRAM REPORT: CPT

## 2018-02-23 PROCEDURE — 99233 SBSQ HOSP IP/OBS HIGH 50: CPT | Mod: GC

## 2018-02-23 RX ORDER — SODIUM CHLORIDE 9 MG/ML
1000 INJECTION, SOLUTION INTRAVENOUS
Qty: 0 | Refills: 0 | Status: DISCONTINUED | OUTPATIENT
Start: 2018-02-23 | End: 2018-03-01

## 2018-02-23 RX ORDER — MORPHINE SULFATE 50 MG/1
2 CAPSULE, EXTENDED RELEASE ORAL ONCE
Qty: 0 | Refills: 0 | Status: DISCONTINUED | OUTPATIENT
Start: 2018-02-23 | End: 2018-02-23

## 2018-02-23 RX ORDER — GLUCAGON INJECTION, SOLUTION 0.5 MG/.1ML
1 INJECTION, SOLUTION SUBCUTANEOUS ONCE
Qty: 0 | Refills: 0 | Status: DISCONTINUED | OUTPATIENT
Start: 2018-02-23 | End: 2018-03-02

## 2018-02-23 RX ORDER — INSULIN LISPRO 100/ML
VIAL (ML) SUBCUTANEOUS
Qty: 0 | Refills: 0 | Status: DISCONTINUED | OUTPATIENT
Start: 2018-02-23 | End: 2018-03-02

## 2018-02-23 RX ORDER — DEXTROSE 50 % IN WATER 50 %
12.5 SYRINGE (ML) INTRAVENOUS ONCE
Qty: 0 | Refills: 0 | Status: DISCONTINUED | OUTPATIENT
Start: 2018-02-23 | End: 2018-03-02

## 2018-02-23 RX ORDER — DEXTROSE 50 % IN WATER 50 %
25 SYRINGE (ML) INTRAVENOUS ONCE
Qty: 0 | Refills: 0 | Status: DISCONTINUED | OUTPATIENT
Start: 2018-02-23 | End: 2018-03-02

## 2018-02-23 RX ORDER — HYDROMORPHONE HYDROCHLORIDE 2 MG/ML
1 INJECTION INTRAMUSCULAR; INTRAVENOUS; SUBCUTANEOUS EVERY 4 HOURS
Qty: 0 | Refills: 0 | Status: DISCONTINUED | OUTPATIENT
Start: 2018-02-23 | End: 2018-03-01

## 2018-02-23 RX ORDER — HYDROMORPHONE HYDROCHLORIDE 2 MG/ML
0.5 INJECTION INTRAMUSCULAR; INTRAVENOUS; SUBCUTANEOUS EVERY 4 HOURS
Qty: 0 | Refills: 0 | Status: DISCONTINUED | OUTPATIENT
Start: 2018-02-23 | End: 2018-03-01

## 2018-02-23 RX ORDER — INSULIN LISPRO 100/ML
VIAL (ML) SUBCUTANEOUS AT BEDTIME
Qty: 0 | Refills: 0 | Status: DISCONTINUED | OUTPATIENT
Start: 2018-02-23 | End: 2018-03-02

## 2018-02-23 RX ORDER — DEXTROSE 50 % IN WATER 50 %
1 SYRINGE (ML) INTRAVENOUS ONCE
Qty: 0 | Refills: 0 | Status: DISCONTINUED | OUTPATIENT
Start: 2018-02-23 | End: 2018-03-02

## 2018-02-23 RX ADMIN — SENNA PLUS 2 TABLET(S): 8.6 TABLET ORAL at 21:19

## 2018-02-23 RX ADMIN — HYDROMORPHONE HYDROCHLORIDE 0.5 MILLIGRAM(S): 2 INJECTION INTRAMUSCULAR; INTRAVENOUS; SUBCUTANEOUS at 19:25

## 2018-02-23 RX ADMIN — Medication 4 MILLIGRAM(S): at 10:35

## 2018-02-23 RX ADMIN — Medication 0.5 MILLIGRAM(S): at 10:38

## 2018-02-23 RX ADMIN — Medication 4 MILLIGRAM(S): at 19:24

## 2018-02-23 RX ADMIN — MORPHINE SULFATE 2 MILLIGRAM(S): 50 CAPSULE, EXTENDED RELEASE ORAL at 08:30

## 2018-02-23 RX ADMIN — ONDANSETRON 4 MILLIGRAM(S): 8 TABLET, FILM COATED ORAL at 07:59

## 2018-02-23 RX ADMIN — MORPHINE SULFATE 2 MILLIGRAM(S): 50 CAPSULE, EXTENDED RELEASE ORAL at 07:59

## 2018-02-23 RX ADMIN — PANTOPRAZOLE SODIUM 40 MILLIGRAM(S): 20 TABLET, DELAYED RELEASE ORAL at 10:36

## 2018-02-23 RX ADMIN — Medication 100 MILLIGRAM(S): at 10:35

## 2018-02-23 RX ADMIN — SODIUM CHLORIDE 50 MILLILITER(S): 9 INJECTION, SOLUTION INTRAVENOUS at 15:03

## 2018-02-23 RX ADMIN — ONDANSETRON 4 MILLIGRAM(S): 8 TABLET, FILM COATED ORAL at 16:29

## 2018-02-23 RX ADMIN — Medication 1 TABLET(S): at 14:28

## 2018-02-23 RX ADMIN — Medication 100 MILLIGRAM(S): at 21:19

## 2018-02-23 RX ADMIN — Medication 10 MILLIGRAM(S): at 10:37

## 2018-02-23 RX ADMIN — ONDANSETRON 4 MILLIGRAM(S): 8 TABLET, FILM COATED ORAL at 02:20

## 2018-02-23 RX ADMIN — Medication 100 MILLIGRAM(S): at 14:28

## 2018-02-23 RX ADMIN — HYDROMORPHONE HYDROCHLORIDE 0.5 MILLIGRAM(S): 2 INJECTION INTRAMUSCULAR; INTRAVENOUS; SUBCUTANEOUS at 18:50

## 2018-02-23 RX ADMIN — MORPHINE SULFATE 2 MILLIGRAM(S): 50 CAPSULE, EXTENDED RELEASE ORAL at 03:45

## 2018-02-23 RX ADMIN — MORPHINE SULFATE 2 MILLIGRAM(S): 50 CAPSULE, EXTENDED RELEASE ORAL at 04:15

## 2018-02-23 NOTE — PROGRESS NOTE ADULT - SUBJECTIVE AND OBJECTIVE BOX
SUBJECTIVE:  - Pt reported nausea and emesis overnight  - He had yogurt last evening  - No fever or chills  - Does endorse epigastric, lower sternal pain    OBJECTIVE:    Vital Signs Last 24 Hrs  T(C): 36.3 (23 Feb 2018 06:58), Max: 36.8 (22 Feb 2018 11:22)  T(F): 97.3 (23 Feb 2018 06:58), Max: 98.3 (22 Feb 2018 11:22)  HR: 88 (23 Feb 2018 06:58) (80 - 88)  BP: 129/84 (23 Feb 2018 06:58) (118/70 - 151/88)  BP(mean): --  RR: 18 (23 Feb 2018 06:58) (18 - 18)  SpO2: 96% (23 Feb 2018 06:58) (96% - 99%)    PHYSICAL EXAM:  Constitutional: mild distress  Eyes: no icterus  Neck: no masses, no LAD  Respiratory: normal inspiratory effort; no wheezing or crackles  Cardiovascular: RRR, normal S1/S2, no murmurs/rubs/gallops  Gastrointestinal: soft, nondistended, nontender, +BS  Extremities: no LE edema  Neurological: AAOx3, no asterixis  Skin: no rashes, bruises, petechiae    LABS:                        13.4   11.51 )-----------( 399      ( 22 Feb 2018 09:02 )             40.1     136  |  95<L>  |  20  ----------------------------<  169<H>  4.8   |  24  |  0.66    Ca    8.9      22 Feb 2018 09:02  Phos  3.2     02-22  Mg     2.2     02-22    TPro  5.9<L>  /  Alb  2.6<L>  /  TBili  1.2  /  DBili  x   /  AST  157<H>  /  ALT  132<H>  /  AlkPhos  297<H>  02-21  PT/INR - ( 22 Feb 2018 08:11 )   PT: 13.0 sec;   INR: 1.20 ratio    PTT - ( 22 Feb 2018 08:11 )  PTT:26.2 sec  LIVER FUNCTIONS - ( 21 Feb 2018 09:28 )  Alb: 2.6 g/dL / Pro: 5.9 g/dL / ALK PHOS: 297 U/L / ALT: 132 U/L / AST: 157 U/L / GGT: x

## 2018-02-23 NOTE — PROGRESS NOTE ADULT - ASSESSMENT
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 On admission imaging and lab, showed low concerns for active infection.

## 2018-02-23 NOTE — CHART NOTE - NSCHARTNOTEFT_GEN_A_CORE
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 Pt is S/P S/P EGD with stent placement 2/22.    Pt seen for malnutrition follow up. Pt's calorie count started yesterday (2/22) 120 kcal, 2gm protein. Pt only able to tolerate having pudding, reported it took him over an hour to finish pudding but was able to keep it down. Pt reports emesis in the middle of the night and intermittent nausea. Pt eating breakfast at time of RD visit, had a minimal amount of cream of wheat cereal so far, reports he intends to take his time and eat more. Encouraged keeping snacks at side of tray and slowly consuming food throughout the day as tolerated. Recommend provide Glucerna, 3 per day (provides additional 660cal, 30gm protein) and 2 Nocarb Prosource (additional 120kcal, 30gm protein).       Patient reports [ x] nausea  [x ] vomiting [ ] diarrhea [ ] constipation  [ ]chewing problems [ ] swallowing issues  [ ] other:      PO intake:  < 50% [x ] 50-75% [ ]   % [ ]  other :     Source for PO intake [ x] Patient [ ] family [ ] chart [ ] staff [ ] other      Current Weight: Weight (kg): 53.5 (02-20 @ 19:45)    Pertinent Medications: MEDICATIONS  (STANDING):  dexamethasone     Tablet 4 milliGRAM(s) Oral two times a day  dextrose 5% + sodium chloride 0.9%. 1000 milliLiter(s) (50 mL/Hr) IV Continuous <Continuous>  docusate sodium 100 milliGRAM(s) Oral three times a day  multivitamin 1 Tablet(s) Oral daily  pantoprazole    Tablet 40 milliGRAM(s) Oral before breakfast  senna 2 Tablet(s) Oral at bedtime    MEDICATIONS  (PRN):  HYDROmorphone  Injectable 0.5 milliGRAM(s) IV Push every 4 hours PRN Moderate Pain (4 - 6)  HYDROmorphone  Injectable 1 milliGRAM(s) IV Push every 4 hours PRN Severe Pain (7 - 10)  LORazepam   Injectable 0.5 milliGRAM(s) IV Push every 4 hours PRN Nausea and/or Vomiting  ondansetron Injectable 4 milliGRAM(s) IV Push every 6 hours PRN Nausea and/or Vomiting    Pertinent Labs:  02-23 Na136 mmol/L Glu 135 mg/dL<H> K+ 5.5 mmol/L<H> Cr  0.76 mg/dL BUN 22 mg/dL Phos 3.3 mg/dL       Skin: No edema. No pressure ulcers.     Estimated Needs:   [x ] no change since previous assessment  [ ] recalculated:       Previous Nutrition Diagnosis:      [x ] Malnutrition, Severe           Nutrition Diagnosis is [x ] ongoing  [ ] resolved [ ] not applicable          New Nutrition Diagnosis: [x ] not applicable        Recommend    [ X] Change Diet To: Continue full liquid diet as tolerated, recommend advance diet to purred as feasible     [ x] Nutrition Supplement: Glucerna, 3 per day (provides additional 660cal, 30gm protein), 2 Nocarb Prosource (additional 120kcal, 30gm protein)     [ ] Nutrition Support    [x ] Other:   1.Continue to monitor weight, po intake, labs, and GI tolerance  2. Encourage adequate po intake   3. RD to follow up with completion of calorie count.        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 Pt is S/P S/P EGD with stent placement 2/22.    Pt seen for malnutrition follow up. Pt's calorie count started yesterday (2/22) 120 kcal, 2gm protein (2.2kcal/kg, .04gm protein/kg current weight 53.5kg). Pt only able to tolerate having pudding, reported it took him over an hour to finish pudding but was able to keep it down. Pt reports emesis in the middle of the night and intermittent nausea. Pt eating breakfast at time of RD visit, had a minimal amount of cream of wheat cereal so far, reports he intends to take his time and eat more. Encouraged keeping snacks at side of tray and slowly consuming food throughout the day as tolerated. Recommend provide Glucerna, 3 per day (provides additional 660cal, 30gm protein) and 2 Nocarb Prosource (additional 120kcal, 30gm protein).       Patient reports [ x] nausea  [x ] vomiting [ ] diarrhea [ ] constipation  [ ]chewing problems [ ] swallowing issues  [ ] other:      PO intake:  < 50% [x ] 50-75% [ ]   % [ ]  other :     Source for PO intake [ x] Patient [ ] family [ ] chart [ ] staff [ ] other      Current Weight: Weight (kg): 53.5 (02-20 @ 19:45)    Pertinent Medications: MEDICATIONS  (STANDING):  dexamethasone     Tablet 4 milliGRAM(s) Oral two times a day  dextrose 5% + sodium chloride 0.9%. 1000 milliLiter(s) (50 mL/Hr) IV Continuous <Continuous>  docusate sodium 100 milliGRAM(s) Oral three times a day  multivitamin 1 Tablet(s) Oral daily  pantoprazole    Tablet 40 milliGRAM(s) Oral before breakfast  senna 2 Tablet(s) Oral at bedtime    MEDICATIONS  (PRN):  HYDROmorphone  Injectable 0.5 milliGRAM(s) IV Push every 4 hours PRN Moderate Pain (4 - 6)  HYDROmorphone  Injectable 1 milliGRAM(s) IV Push every 4 hours PRN Severe Pain (7 - 10)  LORazepam   Injectable 0.5 milliGRAM(s) IV Push every 4 hours PRN Nausea and/or Vomiting  ondansetron Injectable 4 milliGRAM(s) IV Push every 6 hours PRN Nausea and/or Vomiting    Pertinent Labs:  02-23 Na136 mmol/L Glu 135 mg/dL<H> K+ 5.5 mmol/L<H> Cr  0.76 mg/dL BUN 22 mg/dL Phos 3.3 mg/dL       Skin: No edema. No pressure ulcers.     Estimated Needs:   [x ] no change since previous assessment  [ ] recalculated:       Previous Nutrition Diagnosis:      [x ] Malnutrition, Severe           Nutrition Diagnosis is [x ] ongoing  [ ] resolved [ ] not applicable          New Nutrition Diagnosis: [x ] not applicable        Recommend    [ X] Change Diet To: Continue full liquid diet as tolerated, recommend advance diet to purred as feasible     [ x] Nutrition Supplement: Glucerna, 3 per day (provides additional 660cal, 30gm protein), 2 Nocarb Prosource (additional 120kcal, 30gm protein)     [ ] Nutrition Support    [x ] Other:   1.Continue to monitor weight, po intake, labs, and GI tolerance  2. Encourage adequate po intake   3. RD to follow up with completion of calorie count.        Monitoring and Evaluation:     [ x] PO intake [x ] Tolerance to diet prescription [x ] weights [ x] follow up per protocol    [ x] other: RD remains available, Denisha Tubbs RD pager #[phone]

## 2018-02-23 NOTE — PROGRESS NOTE ADULT - ATTENDING COMMENTS
Patient underwent esophageal stenting yesterday. Was able to eat one pudding cup last night, but had multiple episodes of vomiting with ongoing nausea and pain.   -will continue with current diet and advance only as tolerated  -continue IVF for hydration   -will change IV pain meds from morphine to dilaudid   -Ativan PRN for nausea    Wife was also present at bedside. All questions answered and concerns addressed.

## 2018-02-23 NOTE — PROGRESS NOTE ADULT - ASSESSMENT
62 M w/ metastatic esophageal ca not yet on systemic therapy, progressive decreasing functional status, now with weight loss, dysphagia 2/2 tumor and FTT s/p esophageal stent placement

## 2018-02-23 NOTE — PROGRESS NOTE ADULT - ATTENDING COMMENTS
[phone]  Please note I am away next week. Call the office at the number above for the covering physician.

## 2018-02-23 NOTE — PROGRESS NOTE ADULT - PROBLEM SELECTOR PLAN 1
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 CTAP shows no change from previous: Distal esophageal mass, diffuse pulmonary, mediastinal, hilar and hepatic metastatic disease  -continue w/ decadron  - Gastroenterology recs appreciated, EGD with stent placement on 2/22/2018.   - Onco recs appreciated, pending nutritional status improvement.   - C/w with Morphine as needed, tolerating well.  - Zofran for nausea and vomiting.

## 2018-02-23 NOTE — PROGRESS NOTE ADULT - PROBLEM SELECTOR PLAN 6
-PT recs- patient is independent  -currently: FULL CODE  -prophylactic measure: lovenox in setting of malignancy

## 2018-02-23 NOTE — PROGRESS NOTE ADULT - SUBJECTIVE AND OBJECTIVE BOX
Avinash Yousif  PGY-1 Resident Physician   Pager [phone] or [phone] from 7am to 7pm, after 7pm page night float     Patient is a 62y old  Male who presents with a chief complaint of Weakness, N/V, (22 Feb 2018 17:59)    SUBJECTIVE / OVERNIGHT EVENTS:  Patient complaining of nausea and vomiting and abdominal pain overnight, got zofran and IV morphine. Vomit was billous, non bloody. Patient endorsed constipation. Patient denied fever, chills, cough, chest pain, sob, palpitations, focal weakness.     MEDICATIONS  (STANDING):  dexamethasone     Tablet 4 milliGRAM(s) Oral two times a day  dextrose 5% + sodium chloride 0.9%. 1000 milliLiter(s) (50 mL/Hr) IV Continuous <Continuous>  dextrose 5%. 1000 milliLiter(s) (50 mL/Hr) IV Continuous <Continuous>  dextrose 50% Injectable 12.5 Gram(s) IV Push once  dextrose 50% Injectable 25 Gram(s) IV Push once  dextrose 50% Injectable 25 Gram(s) IV Push once  docusate sodium 100 milliGRAM(s) Oral three times a day  insulin lispro (HumaLOG) corrective regimen sliding scale   SubCutaneous three times a day before meals  insulin lispro (HumaLOG) corrective regimen sliding scale   SubCutaneous at bedtime  morphine  - Injectable 2 milliGRAM(s) IV Push once  multivitamin 1 Tablet(s) Oral daily  pantoprazole    Tablet 40 milliGRAM(s) Oral before breakfast  polyethylene glycol 3350 17 Gram(s) Oral daily  polyethylene glycol 3350 17 Gram(s) Oral once  senna 2 Tablet(s) Oral at bedtime    MEDICATIONS  (PRN):  dextrose Gel 1 Dose(s) Oral once PRN Blood Glucose LESS THAN 70 milliGRAM(s)/deciliter  glucagon  Injectable 1 milliGRAM(s) IntraMuscular once PRN Glucose LESS THAN 70 milligrams/deciliter  morphine   Solution 5 milliGRAM(s) Oral every 4 hours PRN moderate severe pain  morphine   Solution 2 milliGRAM(s) Oral every 4 hours PRN mild mod pain  ondansetron Injectable 4 milliGRAM(s) IV Push every 6 hours PRN Nausea and/or Vomiting    Vital Signs Last 24 Hrs  T(C): 36.3 (23 Feb 2018 06:58), Max: 36.8 (22 Feb 2018 11:22)  T(F): 97.3 (23 Feb 2018 06:58), Max: 98.3 (22 Feb 2018 11:22)  HR: 88 (23 Feb 2018 06:58) (80 - 88)  BP: 129/84 (23 Feb 2018 06:58) (118/70 - 151/88)  RR: 18 (23 Feb 2018 06:58) (18 - 18)  SpO2: 96% (23 Feb 2018 06:58) (96% - 99%)  CAPILLARY BLOOD GLUCOSE    POCT Blood Glucose.: 133 mg/dL (22 Feb 2018 22:54)  POCT Blood Glucose.: 145 mg/dL (22 Feb 2018 12:38)  POCT Blood Glucose.: 183 mg/dL (22 Feb 2018 10:17)    I&O's Summary    22 Feb 2018 07:01  -  23 Feb 2018 07:00  --------------------------------------------------------  IN: 860 mL / OUT: 1150 mL / NET: -290 mL    PHYSICAL EXAM:  GENERAL: NAD, well-developed  HEAD:  Atraumatic, Normocephalic  EYES: EOMI, PERRLA, conjunctiva and sclera clear  NECK: Supple, No JVD  CHEST/LUNG: Decreased breath sounds on the right side, other wise clear to auscultation.    HEART: Regular rate and rhythm; Normal S1 S2, No murmurs, rubs, or gallops  ABDOMEN: Soft, Nontender, Nondistended; Bowel sounds present  EXTREMITIES:  2+ Peripheral Pulses, No clubbing, cyanosis, or edema  PSYCH: AAOx3  NEUROLOGY: non-focal  SKIN: No rashes or lesions    LABS:                        13.4   11.51 )-----------( 399      ( 22 Feb 2018 09:02 )             40.1     02-22    136  |  95<L>  |  20  ----------------------------<  169<H>  4.8   |  24  |  0.66    Ca    8.9      22 Feb 2018 09:02  Phos  3.2     02-22  Mg     2.2     02-22    TPro  5.9<L>  /  Alb  2.6<L>  /  TBili  1.2  /  DBili  x   /  AST  157<H>  /  ALT  132<H>  /  AlkPhos  297<H>  02-21  PT/INR - ( 22 Feb 2018 08:11 )   PT: 13.0 sec;   INR: 1.20 ratio    PTT - ( 22 Feb 2018 08:11 )  PTT:26.2 sec        RADIOLOGY & ADDITIONAL TESTS:    Imaging Personally Reviewed:    Consultant(s) Notes Reviewed:      Care Discussed with Consultants/Other Providers: Avinash Yousif  PGY-1 Resident Physician   Pager [phone] or [phone] from 7am to 7pm, after 7pm page night float     Patient is a 62y old  Male who presents with a chief complaint of Weakness, N/V, (22 Feb 2018 17:59)    SUBJECTIVE / OVERNIGHT EVENTS:  Patient complaining of nausea and vomiting and abdominal pain overnight, got zofran and IV morphine. Vomit was billous, non bloody. Patient endorsed constipation. Patient denied fever, chills, cough, chest pain, sob, palpitations, focal weakness.     MEDICATIONS  (STANDING):  dexamethasone     Tablet 4 milliGRAM(s) Oral two times a day  dextrose 5% + sodium chloride 0.9%. 1000 milliLiter(s) (50 mL/Hr) IV Continuous <Continuous>  dextrose 5%. 1000 milliLiter(s) (50 mL/Hr) IV Continuous <Continuous>  dextrose 50% Injectable 12.5 Gram(s) IV Push once  dextrose 50% Injectable 25 Gram(s) IV Push once  dextrose 50% Injectable 25 Gram(s) IV Push once  docusate sodium 100 milliGRAM(s) Oral three times a day  insulin lispro (HumaLOG) corrective regimen sliding scale   SubCutaneous three times a day before meals  insulin lispro (HumaLOG) corrective regimen sliding scale   SubCutaneous at bedtime  morphine  - Injectable 2 milliGRAM(s) IV Push once  multivitamin 1 Tablet(s) Oral daily  pantoprazole    Tablet 40 milliGRAM(s) Oral before breakfast  polyethylene glycol 3350 17 Gram(s) Oral daily  polyethylene glycol 3350 17 Gram(s) Oral once  senna 2 Tablet(s) Oral at bedtime    MEDICATIONS  (PRN):  dextrose Gel 1 Dose(s) Oral once PRN Blood Glucose LESS THAN 70 milliGRAM(s)/deciliter  glucagon  Injectable 1 milliGRAM(s) IntraMuscular once PRN Glucose LESS THAN 70 milligrams/deciliter  morphine   Solution 5 milliGRAM(s) Oral every 4 hours PRN moderate severe pain  morphine   Solution 2 milliGRAM(s) Oral every 4 hours PRN mild mod pain  ondansetron Injectable 4 milliGRAM(s) IV Push every 6 hours PRN Nausea and/or Vomiting    Vital Signs Last 24 Hrs  T(C): 36.3 (23 Feb 2018 06:58), Max: 36.8 (22 Feb 2018 11:22)  T(F): 97.3 (23 Feb 2018 06:58), Max: 98.3 (22 Feb 2018 11:22)  HR: 88 (23 Feb 2018 06:58) (80 - 88)  BP: 129/84 (23 Feb 2018 06:58) (118/70 - 151/88)  RR: 18 (23 Feb 2018 06:58) (18 - 18)  SpO2: 96% (23 Feb 2018 06:58) (96% - 99%)  CAPILLARY BLOOD GLUCOSE    POCT Blood Glucose.: 133 mg/dL (22 Feb 2018 22:54)  POCT Blood Glucose.: 145 mg/dL (22 Feb 2018 12:38)  POCT Blood Glucose.: 183 mg/dL (22 Feb 2018 10:17)    I&O's Summary    22 Feb 2018 07:01  -  23 Feb 2018 07:00  --------------------------------------------------------  IN: 860 mL / OUT: 1150 mL / NET: -290 mL    PHYSICAL EXAM:  GENERAL: NAD, well-developed  HEAD:  Atraumatic, Normocephalic  EYES: EOMI, PERRLA, conjunctiva and sclera clear  NECK: Supple, No JVD  CHEST/LUNG: Decreased breath sounds on the right side, other wise clear to auscultation.    HEART: Regular rate and rhythm; Normal S1 S2, No murmurs, rubs, or gallops  ABDOMEN: Soft, Nontender, Nondistended; Bowel sounds present  EXTREMITIES:  2+ Peripheral Pulses, No clubbing, cyanosis, or edema  PSYCH: AAOx3  NEUROLOGY: non-focal  SKIN: No rashes or lesions    LABS:                        13.4   11.51 )-----------( 399      ( 22 Feb 2018 09:02 )             40.1     02-22    136  |  95<L>  |  20  ----------------------------<  169<H>  4.8   |  24  |  0.66    Ca    8.9      22 Feb 2018 09:02  Phos  3.2     02-22  Mg     2.2     02-22    TPro  5.9<L>  /  Alb  2.6<L>  /  TBili  1.2  /  DBili  x   /  AST  157<H>  /  ALT  132<H>  /  AlkPhos  297<H>  02-21  PT/INR - ( 22 Feb 2018 08:11 )   PT: 13.0 sec;   INR: 1.20 ratio    PTT - ( 22 Feb 2018 08:11 )  PTT:26.2 sec                          15.0   13.58 )-----------( 415      ( 23 Feb 2018 09:32 )             45.6     Hgb Trend: 15.0<--, 13.4<--, 13.0<--, 13.9<--, 15.8<--  02-23    136  |  95<L>  |  22  ----------------------------<  135<H>  5.5<H>   |  22  |  0.76    Ca    9.5      23 Feb 2018 09:34  Phos  3.3     02-23  Mg     2.1     02-23    Creatinine Trend: 0.76<--, 0.66<--, 0.55<--, 0.70<--, 0.95<--, 1.03<--  PT/INR - ( 22 Feb 2018 08:11 )   PT: 13.0 sec;   INR: 1.20 ratio    PTT - ( 22 Feb 2018 08:11 )  PTT:26.2 sec    RADIOLOGY & ADDITIONAL TESTS:    Imaging Personally Reviewed:    Consultant(s) Notes Reviewed:      Care Discussed with Consultants/Other Providers:

## 2018-02-23 NOTE — PROGRESS NOTE ADULT - ATTENDING COMMENTS
62 year old male with history of esophageal adenocarcinoma with metastatic disease lung, liver and brain s/p EGD with placement of partially covered stent on 2/22/2018. Continues to have pain and nausea.  Receiving pain medications. Currently on full liquid diet.    1. Esophageal adenocarcinoma s/p stent placement - continues to have pain.  Typically patients who have placement of metal stents can experience pain for 24-48 hours after placement of stent due to stent expansion.  Continue supportive management. Continue full liquid diet.

## 2018-02-23 NOTE — PROGRESS NOTE ADULT - SUBJECTIVE AND OBJECTIVE BOX
Chief Complaint: esophageal ca    INTERVAL HPI/OVERNIGHT EVENTS: s/p stent placement. Tolerated procedure well. Currently sleepy after receiving pain medication. Family at bedside. Pt c/o substernal CP in the area of the stent. + coughing + spitting up/vomiting mucous. Ate little today. Sat in chair for 30 min.     MEDICATIONS  (STANDING):  dexamethasone     Tablet 4 milliGRAM(s) Oral two times a day  dextrose 5% + sodium chloride 0.9%. 1000 milliLiter(s) (50 mL/Hr) IV Continuous <Continuous>  dextrose 5%. 1000 milliLiter(s) (50 mL/Hr) IV Continuous <Continuous>  dextrose 50% Injectable 12.5 Gram(s) IV Push once  dextrose 50% Injectable 25 Gram(s) IV Push once  dextrose 50% Injectable 25 Gram(s) IV Push once  docusate sodium 100 milliGRAM(s) Oral three times a day  insulin lispro (HumaLOG) corrective regimen sliding scale   SubCutaneous three times a day before meals  insulin lispro (HumaLOG) corrective regimen sliding scale   SubCutaneous at bedtime  multivitamin 1 Tablet(s) Oral daily  pantoprazole    Tablet 40 milliGRAM(s) Oral before breakfast  senna 2 Tablet(s) Oral at bedtime    MEDICATIONS  (PRN):  bisacodyl Suppository 10 milliGRAM(s) Rectal daily PRN Constipation  dextrose Gel 1 Dose(s) Oral once PRN Blood Glucose LESS THAN 70 milliGRAM(s)/deciliter  glucagon  Injectable 1 milliGRAM(s) IntraMuscular once PRN Glucose LESS THAN 70 milligrams/deciliter  HYDROmorphone  Injectable 0.5 milliGRAM(s) IV Push every 4 hours PRN Moderate Pain (4 - 6)  HYDROmorphone  Injectable 1 milliGRAM(s) IV Push every 4 hours PRN Severe Pain (7 - 10)  LORazepam   Injectable 0.5 milliGRAM(s) IV Push every 4 hours PRN Nausea and/or Vomiting  ondansetron Injectable 4 milliGRAM(s) IV Push every 6 hours PRN Nausea and/or Vomiting      Allergies    No Known Allergies    Intolerances        ROS: as above     Vital Signs Last 24 Hrs  T(C): 36.8 (23 Feb 2018 21:41), Max: 36.8 (23 Feb 2018 21:41)  T(F): 98.2 (23 Feb 2018 21:41), Max: 98.2 (23 Feb 2018 21:41)  HR: 96 (23 Feb 2018 21:41) (81 - 110)  BP: 130/87 (23 Feb 2018 21:41) (109/72 - 130/87)  BP(mean): --  RR: 18 (23 Feb 2018 21:41) (18 - 18)  SpO2: 96% (23 Feb 2018 21:41) (94% - 98%)    Physical Exam:   sleepy, eyes closed during encounter   RRR S1S2  decr BS at bases   soft NTNDBS+  no c/c/e    LABS:                        15.0   13.58 )-----------( 415      ( 23 Feb 2018 09:32 )             45.6     02-23    135  |  96  |  20  ----------------------------<  242<H>  4.8   |  19<L>  |  0.61    Ca    8.9      23 Feb 2018 17:36  Phos  3.3     02-23  Mg     2.1     02-23      PT/INR - ( 22 Feb 2018 08:11 )   PT: 13.0 sec;   INR: 1.20 ratio         PTT - ( 22 Feb 2018 08:11 )  PTT:26.2 sec

## 2018-02-23 NOTE — PROGRESS NOTE ADULT - PROBLEM SELECTOR PLAN 1
D/w out pt oncologist. Plan to treat pt next week with single agent Irinotecan with palliative intent if clinically stable and can tolerate nutrition.   I discussed with family, pt too sleepy to participate, regarding rationale and SE of Irinotecan including but not limited to alopecia, cytopenias, diarrhea, risk of infection, bleeding. Pt cannot sign consent at this time as too sleepy. Plan to review the treatment plan again with him on Monday. Plan for tx Mon or Tues. Chemo nurse on [facility] aware.   Please transfer pt to [facility] when bed is available.   Elevated LFTs likely to be related to hepatic mets. PLease check CMP In am.   Overall pt's performance status is borderline however prior to this several weeks ago, pt was active, going to work daily.   Would like to give him a chance to see if DMT can improve QOL and symptom burden.   Her 2 FISH negative. Foundation analysis is pending.

## 2018-02-23 NOTE — PROGRESS NOTE ADULT - ASSESSMENT
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 He p/w reduced oral intake and appetite.    Impression:  1) Stage IV Esophageal Adenocarcinoma - with dysphagia. Now s/p 23 mm x 15 cm WallFlex partially covered stent placement (2/22/2018).   2) Elevated liver enzymes - likely due to metastatic liver disease    Plan:  - Full liquid diet as tolerated, goal is to eventually advance to pureed diet  - Pain control per primary team  - Monitor electrolytes  - Avoid hepatotoxic agents

## 2018-02-24 LAB
ANION GAP SERPL CALC-SCNC: 22 MMOL/L — HIGH (ref 5–17)
ANISOCYTOSIS BLD QL: SLIGHT — SIGNIFICANT CHANGE UP
BASOPHILS # BLD AUTO: 0 K/UL — SIGNIFICANT CHANGE UP (ref 0–0.2)
BASOPHILS NFR BLD AUTO: 0 % — SIGNIFICANT CHANGE UP (ref 0–2)
BUN SERPL-MCNC: 23 MG/DL — SIGNIFICANT CHANGE UP (ref 7–23)
CALCIUM SERPL-MCNC: 9.3 MG/DL — SIGNIFICANT CHANGE UP (ref 8.4–10.5)
CHLORIDE SERPL-SCNC: 95 MMOL/L — LOW (ref 96–108)
CO2 SERPL-SCNC: 18 MMOL/L — LOW (ref 22–31)
CREAT SERPL-MCNC: 0.72 MG/DL — SIGNIFICANT CHANGE UP (ref 0.5–1.3)
CULTURE RESULTS: SIGNIFICANT CHANGE UP
CULTURE RESULTS: SIGNIFICANT CHANGE UP
EOSINOPHIL # BLD AUTO: 0 K/UL — SIGNIFICANT CHANGE UP (ref 0–0.5)
EOSINOPHIL NFR BLD AUTO: 0 — SIGNIFICANT CHANGE UP
GIANT PLATELETS BLD QL SMEAR: PRESENT — SIGNIFICANT CHANGE UP
GLUCOSE SERPL-MCNC: 185 MG/DL — HIGH (ref 70–99)
HCT VFR BLD CALC: 43.1 % — SIGNIFICANT CHANGE UP (ref 39–50)
HGB BLD-MCNC: 14 G/DL — SIGNIFICANT CHANGE UP (ref 13–17)
LYMPHOCYTES # BLD AUTO: 1.26 K/UL — SIGNIFICANT CHANGE UP (ref 1–3.3)
LYMPHOCYTES # BLD AUTO: 7.7 % — LOW (ref 13–44)
MACROCYTES BLD QL: SLIGHT — SIGNIFICANT CHANGE UP
MAGNESIUM SERPL-MCNC: 2.2 MG/DL — SIGNIFICANT CHANGE UP (ref 1.6–2.6)
MANUAL SMEAR VERIFICATION: SIGNIFICANT CHANGE UP
MCHC RBC-ENTMCNC: 27.2 PG — SIGNIFICANT CHANGE UP (ref 27–34)
MCHC RBC-ENTMCNC: 32.5 GM/DL — SIGNIFICANT CHANGE UP (ref 32–36)
MCV RBC AUTO: 83.7 FL — SIGNIFICANT CHANGE UP (ref 80–100)
MONOCYTES # BLD AUTO: 1.68 K/UL — HIGH (ref 0–0.9)
MONOCYTES NFR BLD AUTO: 10.3 % — SIGNIFICANT CHANGE UP (ref 2–14)
NEUTROPHILS # BLD AUTO: 13.41 K/UL — HIGH (ref 1.8–7.4)
NEUTROPHILS NFR BLD AUTO: 82 % — HIGH (ref 43–77)
PHOSPHATE SERPL-MCNC: 2.4 MG/DL — LOW (ref 2.5–4.5)
PLAT MORPH BLD: NORMAL — SIGNIFICANT CHANGE UP
PLATELET # BLD AUTO: 382 K/UL — SIGNIFICANT CHANGE UP (ref 150–400)
POTASSIUM SERPL-MCNC: 5.1 MMOL/L — SIGNIFICANT CHANGE UP (ref 3.5–5.3)
POTASSIUM SERPL-SCNC: 5.1 MMOL/L — SIGNIFICANT CHANGE UP (ref 3.5–5.3)
RBC # BLD: 5.15 M/UL — SIGNIFICANT CHANGE UP (ref 4.2–5.8)
RBC # FLD: 16.8 % — HIGH (ref 10.3–14.5)
RBC BLD AUTO: ABNORMAL
SODIUM SERPL-SCNC: 135 MMOL/L — SIGNIFICANT CHANGE UP (ref 135–145)
SPECIMEN SOURCE: SIGNIFICANT CHANGE UP
SPECIMEN SOURCE: SIGNIFICANT CHANGE UP
WBC # BLD: 16.35 K/UL — HIGH (ref 3.8–10.5)
WBC # FLD AUTO: 16.35 K/UL — HIGH (ref 3.8–10.5)

## 2018-02-24 PROCEDURE — 99233 SBSQ HOSP IP/OBS HIGH 50: CPT | Mod: GC

## 2018-02-24 RX ADMIN — HYDROMORPHONE HYDROCHLORIDE 0.5 MILLIGRAM(S): 2 INJECTION INTRAMUSCULAR; INTRAVENOUS; SUBCUTANEOUS at 06:44

## 2018-02-24 RX ADMIN — Medication 62.5 MILLIMOLE(S): at 13:38

## 2018-02-24 RX ADMIN — Medication 1: at 09:53

## 2018-02-24 RX ADMIN — HYDROMORPHONE HYDROCHLORIDE 0.5 MILLIGRAM(S): 2 INJECTION INTRAMUSCULAR; INTRAVENOUS; SUBCUTANEOUS at 22:00

## 2018-02-24 RX ADMIN — ONDANSETRON 4 MILLIGRAM(S): 8 TABLET, FILM COATED ORAL at 11:39

## 2018-02-24 RX ADMIN — Medication 100 MILLIGRAM(S): at 05:38

## 2018-02-24 RX ADMIN — Medication 100 MILLIGRAM(S): at 13:34

## 2018-02-24 RX ADMIN — Medication 1: at 18:44

## 2018-02-24 RX ADMIN — Medication 1 TABLET(S): at 13:22

## 2018-02-24 RX ADMIN — HYDROMORPHONE HYDROCHLORIDE 0.5 MILLIGRAM(S): 2 INJECTION INTRAMUSCULAR; INTRAVENOUS; SUBCUTANEOUS at 01:40

## 2018-02-24 RX ADMIN — Medication 1: at 13:37

## 2018-02-24 RX ADMIN — HYDROMORPHONE HYDROCHLORIDE 0.5 MILLIGRAM(S): 2 INJECTION INTRAMUSCULAR; INTRAVENOUS; SUBCUTANEOUS at 14:02

## 2018-02-24 RX ADMIN — HYDROMORPHONE HYDROCHLORIDE 0.5 MILLIGRAM(S): 2 INJECTION INTRAMUSCULAR; INTRAVENOUS; SUBCUTANEOUS at 06:16

## 2018-02-24 RX ADMIN — HYDROMORPHONE HYDROCHLORIDE 0.5 MILLIGRAM(S): 2 INJECTION INTRAMUSCULAR; INTRAVENOUS; SUBCUTANEOUS at 02:00

## 2018-02-24 RX ADMIN — HYDROMORPHONE HYDROCHLORIDE 0.5 MILLIGRAM(S): 2 INJECTION INTRAMUSCULAR; INTRAVENOUS; SUBCUTANEOUS at 21:36

## 2018-02-24 RX ADMIN — SENNA PLUS 2 TABLET(S): 8.6 TABLET ORAL at 21:39

## 2018-02-24 RX ADMIN — Medication 4 MILLIGRAM(S): at 18:30

## 2018-02-24 RX ADMIN — Medication 4 MILLIGRAM(S): at 05:38

## 2018-02-24 RX ADMIN — Medication 100 MILLIGRAM(S): at 21:39

## 2018-02-24 RX ADMIN — PANTOPRAZOLE SODIUM 40 MILLIGRAM(S): 20 TABLET, DELAYED RELEASE ORAL at 05:38

## 2018-02-24 NOTE — PROGRESS NOTE ADULT - PROBLEM SELECTOR PLAN 6
-PT recs- patient is independent  -currently: FULL CODE  -prophylactic measure: lovenox in setting of malignancy -PT recs- patient is independent  -currently: FULL CODE  -prophylactic measure: SCD's for now, should ideally be on lovenox if possible.

## 2018-02-24 NOTE — PROGRESS NOTE ADULT - PROBLEM SELECTOR PLAN 5
-full liquid diet, progress diet as tolerated. -Will advance diet to puree today and continue as tolerated.

## 2018-02-24 NOTE — PROGRESS NOTE ADULT - PROBLEM SELECTOR PLAN 2
- In setting of malignancy w/ metastatic disease.    - Nutrition recs one tolerating PO. - In setting of malignancy w/ metastatic disease.    - Nutrition recs once tolerating PO.  -Will advance diet to puree today and continue as tolerated.

## 2018-02-24 NOTE — PROGRESS NOTE ADULT - ATTENDING COMMENTS
-Patient discussed and seen/examined on 2/24/18. -Patient discussed and seen/examined on 2/24/18.  -Discussed with patient and his family at bedside today. Tolerating some puree food today; will continue to advance as tolerated. Per oncology recs, once patient stable and tolerating PO intake, plan to transfer to oncology unit for likely chemotherapy and further management.   -C/w incentive spirometer, OOB to chair, ambulate as tolerated to reduce atelectasis and deconditioning. PT follow up.

## 2018-02-24 NOTE — PROGRESS NOTE ADULT - SUBJECTIVE AND OBJECTIVE BOX
Patient is a 62y old  Male who presents with a chief complaint of Weakness, N/V, (22 Feb 2018 17:59)    SUBJECTIVE / OVERNIGHT EVENTS:  Patient with one episode if mucous vomiting overnight. Patient had a bowel movement yesterday. Patient endorsed pain well controlled with dilaudid. Patient denied fever, chills, sob, chest pain, abdominal pain, nausea, vomiting, diarrhea, focal weakness, lightheadedness and dizziness     MEDICATIONS  (STANDING):  dexamethasone     Tablet 4 milliGRAM(s) Oral two times a day  dextrose 5% + sodium chloride 0.9%. 1000 milliLiter(s) (50 mL/Hr) IV Continuous <Continuous>  dextrose 5%. 1000 milliLiter(s) (50 mL/Hr) IV Continuous <Continuous>  dextrose 50% Injectable 12.5 Gram(s) IV Push once  dextrose 50% Injectable 25 Gram(s) IV Push once  dextrose 50% Injectable 25 Gram(s) IV Push once  docusate sodium 100 milliGRAM(s) Oral three times a day  insulin lispro (HumaLOG) corrective regimen sliding scale   SubCutaneous three times a day before meals  insulin lispro (HumaLOG) corrective regimen sliding scale   SubCutaneous at bedtime  multivitamin 1 Tablet(s) Oral daily  pantoprazole    Tablet 40 milliGRAM(s) Oral before breakfast  senna 2 Tablet(s) Oral at bedtime  sodium phosphate IVPB 15 milliMole(s) IV Intermittent once    MEDICATIONS  (PRN):  bisacodyl Suppository 10 milliGRAM(s) Rectal daily PRN Constipation  dextrose Gel 1 Dose(s) Oral once PRN Blood Glucose LESS THAN 70 milliGRAM(s)/deciliter  glucagon  Injectable 1 milliGRAM(s) IntraMuscular once PRN Glucose LESS THAN 70 milligrams/deciliter  HYDROmorphone  Injectable 0.5 milliGRAM(s) IV Push every 4 hours PRN Moderate Pain (4 - 6)  HYDROmorphone  Injectable 1 milliGRAM(s) IV Push every 4 hours PRN Severe Pain (7 - 10)  LORazepam   Injectable 0.5 milliGRAM(s) IV Push every 4 hours PRN Nausea and/or Vomiting  ondansetron Injectable 4 milliGRAM(s) IV Push every 6 hours PRN Nausea and/or Vomiting    Vital Signs Last 24 Hrs  T(C): 36.6 (24 Feb 2018 05:42), Max: 36.9 (24 Feb 2018 00:43)  T(F): 97.9 (24 Feb 2018 05:42), Max: 98.4 (24 Feb 2018 00:43)  HR: 97 (24 Feb 2018 05:42) (89 - 110)  BP: 129/85 (24 Feb 2018 05:42) (109/72 - 130/87)  RR: 19 (24 Feb 2018 05:42) (18 - 19)  SpO2: 94% (24 Feb 2018 05:42) (94% - 96%)  CAPILLARY BLOOD GLUCOSE    POCT Blood Glucose.: 153 mg/dL (24 Feb 2018 08:58)  POCT Blood Glucose.: 151 mg/dL (23 Feb 2018 21:18)  POCT Blood Glucose.: 198 mg/dL (23 Feb 2018 18:13)  POCT Blood Glucose.: 207 mg/dL (23 Feb 2018 14:26)    I&O's Summary    23 Feb 2018 07:01  -  24 Feb 2018 07:00  --------------------------------------------------------  IN: 1980 mL / OUT: 1650 mL / NET: 330 mL    PHYSICAL EXAM:  GENERAL: NAD, well-developed  HEAD:  Atraumatic, Normocephalic  EYES: EOMI, PERRLA, conjunctiva and sclera clear  NECK: Supple, No JVD  CHEST/LUNG: Decreased breath sounds on the right side, other wise clear to auscultation.    HEART: Regular rate and rhythm; Normal S1 S2, No murmurs, rubs, or gallops  ABDOMEN: Soft, Nontender, Nondistended; Bowel sounds present  EXTREMITIES:  2+ Peripheral Pulses, No clubbing, cyanosis, or edema  PSYCH: AAOx3  NEUROLOGY: non-focal  SKIN: No rashes or lesions    LABS:                        14.0   16.35 )-----------( 382      ( 24 Feb 2018 08:44 )             43.1     02-24    135  |  95<L>  |  23  ----------------------------<  185<H>  5.1   |  18<L>  |  0.72    Ca    9.3      24 Feb 2018 08:44  Phos  2.4     02-24  Mg     2.2     02-24            RADIOLOGY & ADDITIONAL TESTS:    Imaging Personally Reviewed:    Consultant(s) Notes Reviewed:  Gastroenterolgy, Oncology     Care Discussed with Consultants/Other Providers:

## 2018-02-25 DIAGNOSIS — D72.829 ELEVATED WHITE BLOOD CELL COUNT, UNSPECIFIED: ICD-10-CM

## 2018-02-25 PROCEDURE — 99233 SBSQ HOSP IP/OBS HIGH 50: CPT | Mod: GC

## 2018-02-25 PROCEDURE — 71045 X-RAY EXAM CHEST 1 VIEW: CPT | Mod: 26

## 2018-02-25 PROCEDURE — 99233 SBSQ HOSP IP/OBS HIGH 50: CPT

## 2018-02-25 PROCEDURE — 99232 SBSQ HOSP IP/OBS MODERATE 35: CPT | Mod: GC

## 2018-02-25 RX ADMIN — Medication 4 MILLIGRAM(S): at 21:15

## 2018-02-25 RX ADMIN — HYDROMORPHONE HYDROCHLORIDE 0.5 MILLIGRAM(S): 2 INJECTION INTRAMUSCULAR; INTRAVENOUS; SUBCUTANEOUS at 21:15

## 2018-02-25 RX ADMIN — Medication 2: at 12:50

## 2018-02-25 RX ADMIN — SENNA PLUS 2 TABLET(S): 8.6 TABLET ORAL at 21:15

## 2018-02-25 RX ADMIN — HYDROMORPHONE HYDROCHLORIDE 0.5 MILLIGRAM(S): 2 INJECTION INTRAMUSCULAR; INTRAVENOUS; SUBCUTANEOUS at 02:15

## 2018-02-25 RX ADMIN — HYDROMORPHONE HYDROCHLORIDE 0.5 MILLIGRAM(S): 2 INJECTION INTRAMUSCULAR; INTRAVENOUS; SUBCUTANEOUS at 06:30

## 2018-02-25 RX ADMIN — Medication 1 TABLET(S): at 11:27

## 2018-02-25 RX ADMIN — HYDROMORPHONE HYDROCHLORIDE 0.5 MILLIGRAM(S): 2 INJECTION INTRAMUSCULAR; INTRAVENOUS; SUBCUTANEOUS at 10:57

## 2018-02-25 RX ADMIN — HYDROMORPHONE HYDROCHLORIDE 0.5 MILLIGRAM(S): 2 INJECTION INTRAMUSCULAR; INTRAVENOUS; SUBCUTANEOUS at 22:00

## 2018-02-25 RX ADMIN — Medication 100 MILLIGRAM(S): at 21:15

## 2018-02-25 RX ADMIN — PANTOPRAZOLE SODIUM 40 MILLIGRAM(S): 20 TABLET, DELAYED RELEASE ORAL at 05:46

## 2018-02-25 RX ADMIN — HYDROMORPHONE HYDROCHLORIDE 0.5 MILLIGRAM(S): 2 INJECTION INTRAMUSCULAR; INTRAVENOUS; SUBCUTANEOUS at 16:22

## 2018-02-25 RX ADMIN — Medication 4 MILLIGRAM(S): at 07:56

## 2018-02-25 RX ADMIN — HYDROMORPHONE HYDROCHLORIDE 0.5 MILLIGRAM(S): 2 INJECTION INTRAMUSCULAR; INTRAVENOUS; SUBCUTANEOUS at 01:56

## 2018-02-25 RX ADMIN — Medication 100 MILLIGRAM(S): at 05:46

## 2018-02-25 RX ADMIN — HYDROMORPHONE HYDROCHLORIDE 0.5 MILLIGRAM(S): 2 INJECTION INTRAMUSCULAR; INTRAVENOUS; SUBCUTANEOUS at 06:43

## 2018-02-25 RX ADMIN — SODIUM CHLORIDE 50 MILLILITER(S): 9 INJECTION, SOLUTION INTRAVENOUS at 11:27

## 2018-02-25 RX ADMIN — HYDROMORPHONE HYDROCHLORIDE 0.5 MILLIGRAM(S): 2 INJECTION INTRAMUSCULAR; INTRAVENOUS; SUBCUTANEOUS at 11:30

## 2018-02-25 RX ADMIN — Medication 100 MILLIGRAM(S): at 13:17

## 2018-02-25 RX ADMIN — HYDROMORPHONE HYDROCHLORIDE 0.5 MILLIGRAM(S): 2 INJECTION INTRAMUSCULAR; INTRAVENOUS; SUBCUTANEOUS at 16:55

## 2018-02-25 NOTE — PROGRESS NOTE ADULT - SUBJECTIVE AND OBJECTIVE BOX
Patient is a 62y old  Male who presents with a chief complaint of Weakness, N/V, (22 Feb 2018 17:59)      INTERVAL HPI/OVERNIGHT EVENTS:  T(C): 36.6 (02-25-18 @ 16:43), Max: 36.8 (02-24-18 @ 22:44)  HR: 82 (02-25-18 @ 16:43) (78 - 89)  BP: 114/74 (02-25-18 @ 16:43) (110/74 - 135/70)  RR: 18 (02-25-18 @ 16:43) (18 - 18)  SpO2: 96% (02-25-18 @ 16:43) (95% - 97%)  Wt(kg): --  I&O's Summary    24 Feb 2018 07:01  -  25 Feb 2018 07:00  --------------------------------------------------------  IN: 800 mL / OUT: 1950 mL / NET: -1150 mL    25 Feb 2018 07:01  -  25 Feb 2018 18:25  --------------------------------------------------------  IN: 1080 mL / OUT: 445 mL / NET: 635 mL      LABS:                        14.0   16.35 )-----------( 382      ( 24 Feb 2018 08:44 )             43.1     02-24    135  |  95<L>  |  23  ----------------------------<  185<H>  5.1   |  18<L>  |  0.72    Ca    9.3      24 Feb 2018 08:44  Phos  2.4     02-24  Mg     2.2     02-24          CAPILLARY BLOOD GLUCOSE      POCT Blood Glucose.: 175 mg/dL (25 Feb 2018 17:51)  POCT Blood Glucose.: 235 mg/dL (25 Feb 2018 12:45)  POCT Blood Glucose.: 145 mg/dL (25 Feb 2018 07:46)  POCT Blood Glucose.: 145 mg/dL (24 Feb 2018 22:33)    REVIEW OF SYSTEMS:  No fever, chills, night sweats, nausea, vomiting,, dysuria, constipation,  sob, chest pain, dizziness, + abdominal pain    RADIOLOGY & ADDITIONAL TESTS:    Imaging Personally Reviewed:  [ ] YES  [ ] NO  No recent imaging, rpt CXR ordered     Consultant(s) Notes Reviewed:  [X ] YES  [ ] NO    PHYSICAL EXAM:  GED: NAD; frail   HEENT:  Anicteric, no thrush  CHEST:  Non-labored breathing, lungs clear b/l  HEART:  +s1, s2 heart sounds, no murmurs  ABDOMEN:  Soft, mildly tender in epigastrium, no rebound, no guarding  EXTREMITIES:  Warm and well perfused, no edema  SKIN:  No rash  NEURO:   Awake, interactive, following commands    Care Discussed with Consultants/Other Providers [X ] YES  [ ] NO Patient is a 62y old  Male who presents with a chief complaint of Weakness, N/V, (22 Feb 2018 17:59)      INTERVAL HPI/OVERNIGHT EVENTS: Patient tolerating Puree food about the same as yesterday. Says has mild epigastric pain which is controlled. Nausea also controlled.     T(C): 36.6 (02-25-18 @ 16:43), Max: 36.8 (02-24-18 @ 22:44)  HR: 82 (02-25-18 @ 16:43) (78 - 89)  BP: 114/74 (02-25-18 @ 16:43) (110/74 - 135/70)  RR: 18 (02-25-18 @ 16:43) (18 - 18)  SpO2: 96% (02-25-18 @ 16:43) (95% - 97%)  Wt(kg): --  I&O's Summary    24 Feb 2018 07:01  -  25 Feb 2018 07:00  --------------------------------------------------------  IN: 800 mL / OUT: 1950 mL / NET: -1150 mL    25 Feb 2018 07:01  -  25 Feb 2018 18:25  --------------------------------------------------------  IN: 1080 mL / OUT: 445 mL / NET: 635 mL      LABS:                        14.0   16.35 )-----------( 382      ( 24 Feb 2018 08:44 )             43.1     02-24    135  |  95<L>  |  23  ----------------------------<  185<H>  5.1   |  18<L>  |  0.72    Ca    9.3      24 Feb 2018 08:44  Phos  2.4     02-24  Mg     2.2     02-24          CAPILLARY BLOOD GLUCOSE      POCT Blood Glucose.: 175 mg/dL (25 Feb 2018 17:51)  POCT Blood Glucose.: 235 mg/dL (25 Feb 2018 12:45)  POCT Blood Glucose.: 145 mg/dL (25 Feb 2018 07:46)  POCT Blood Glucose.: 145 mg/dL (24 Feb 2018 22:33)    REVIEW OF SYSTEMS:  No fever, chills, night sweats, nausea, vomiting,, dysuria, constipation,  sob, chest pain, dizziness, + abdominal pain    RADIOLOGY & ADDITIONAL TESTS:    Imaging Personally Reviewed:  [ ] YES  [ ] NO  No recent imaging, rpt CXR ordered     Consultant(s) Notes Reviewed:  [X ] YES  [ ] NO    PHYSICAL EXAM:  GED: NAD; frail   HEENT:  Anicteric, no thrush  CHEST:  Non-labored breathing, lungs clear b/l  HEART:  +s1, s2 heart sounds, no murmurs  ABDOMEN:  Soft, mildly tender in epigastrium, no rebound, no guarding  EXTREMITIES:  Warm and well perfused, no edema  SKIN:  No rash  NEURO:   Awake, interactive, following commands    Care Discussed with Consultants/Other Providers [X ] YES  [ ] NO

## 2018-02-25 NOTE — PROGRESS NOTE ADULT - PROBLEM SELECTOR PLAN 1
Plan is for treatment with single agent Irinotecan this week with palliative intent.   Plan to review the treatment plan again with him on Monday. Plan for tx Mon or Tues. Chemo nurse on [facility] aware.   Please transfer pt to [facility] when bed is available.   Elevated LFTs likely to be related to hepatic mets. Please check CMP In am.   Need to see bilirubin before treating with irinotecan.    PT evaluation; may need rehab.    Her 2 FISH negative. Foundation analysis is pending.    Omar Wan MD   [phone] Plan is for treatment with single agent Irinotecan this week with palliative intent.   Plan to review the treatment plan again with him on Monday. Plan for tx Mon or Tues. Chemo nurse on [facility] aware.   Please transfer pt to [facility] when bed is available.   Elevated LFTs likely to be related to hepatic mets. Please check CMP In am.   Need to see bilirubin before treating with irinotecan.    PT evaluation; may need rehab.    Her 2 FISH negative. Foundation analysis is pending.    Omar Wan MD (Weekend Coverage)  [phone]

## 2018-02-25 NOTE — PROGRESS NOTE ADULT - PROBLEM SELECTOR PLAN 2
- In setting of malignancy w/ metastatic disease.    - Nutrition recs once tolerating PO.  - monitor for refeeding   - Patient tolerating diet - In setting of malignancy w/ metastatic disease.    - Further Nutrition recs once tolerating PO.  - monitor for refeeding   - Patient tolerating diet  -Calorie counts.  -C/w pureed diet.

## 2018-02-25 NOTE — PROGRESS NOTE ADULT - PROBLEM SELECTOR PLAN 7
- Palliative care recs appreciated- FULL CODE -PT recs- patient is independent  -currently: FULL CODE  -prophylactic measure: SCD's for now, should ideally be on lovenox if possible. -PT recs initially- patient is independent. -PT re-eval now pending.   -currently: FULL CODE  -prophylactic measure: SCD's for now, should ideally be on lovenox if possible.

## 2018-02-25 NOTE — PROGRESS NOTE ADULT - SUBJECTIVE AND OBJECTIVE BOX
Chief Complaint:  Patient is a 62y old  Male who presents with a chief complaint of Weakness, N/V, (22 Feb 2018 17:59)      Interval Events: Patient still has some epigastric and chest discomfort that is intermittent throughout the day. The pain with swallowing has improved. He is tolerating PO diet - clear liquids and small amounts of pureed foods. He brought up some mucous once. He had one small semisolid BM yesterday.  e denies fever, chills, lower abdominal pain, melena, hematochezia, or hematemesis.     Allergies:  No Known Allergies      Hospital Medications:  bisacodyl Suppository 10 milliGRAM(s) Rectal daily PRN  dexamethasone     Tablet 4 milliGRAM(s) Oral two times a day  dextrose 5% + sodium chloride 0.9%. 1000 milliLiter(s) IV Continuous <Continuous>  dextrose 5%. 1000 milliLiter(s) IV Continuous <Continuous>  dextrose 50% Injectable 12.5 Gram(s) IV Push once  dextrose 50% Injectable 25 Gram(s) IV Push once  dextrose 50% Injectable 25 Gram(s) IV Push once  dextrose Gel 1 Dose(s) Oral once PRN  docusate sodium 100 milliGRAM(s) Oral three times a day  glucagon  Injectable 1 milliGRAM(s) IntraMuscular once PRN  HYDROmorphone  Injectable 0.5 milliGRAM(s) IV Push every 4 hours PRN  HYDROmorphone  Injectable 1 milliGRAM(s) IV Push every 4 hours PRN  insulin lispro (HumaLOG) corrective regimen sliding scale   SubCutaneous three times a day before meals  insulin lispro (HumaLOG) corrective regimen sliding scale   SubCutaneous at bedtime  LORazepam   Injectable 0.5 milliGRAM(s) IV Push every 4 hours PRN  multivitamin 1 Tablet(s) Oral daily  ondansetron Injectable 4 milliGRAM(s) IV Push every 6 hours PRN  pantoprazole    Tablet 40 milliGRAM(s) Oral before breakfast  senna 2 Tablet(s) Oral at bedtime      PMHX/PSHX:  Esophageal cancer  TIA (transient ischemic attack)  Diabetes  H/O prior ablation treatment  Status post placement of implantable loop recorder      Family history:  Family history of chronic ischemic heart disease  Family history of hypertension (Father)      ROS: Negative, except as otherwise noted above    PHYSICAL EXAM:   Vital Signs:  Vital Signs Last 24 Hrs  T(C): 36.8 (25 Feb 2018 05:47), Max: 36.8 (24 Feb 2018 22:44)  T(F): 98.3 (25 Feb 2018 05:47), Max: 98.3 (25 Feb 2018 05:47)  HR: 78 (25 Feb 2018 05:47) (78 - 86)  BP: 125/81 (25 Feb 2018 05:47) (118/75 - 135/70)  BP(mean): --  RR: 18 (25 Feb 2018 05:47) (18 - 18)  SpO2: 96% (25 Feb 2018 05:47) (94% - 96%)  Daily     Daily     GENERAL: No acute distress, chronically ill appearing  HEENT:  Anicteric, no thrush  CHEST:  Non-labored breathing, lungs clear b/l  HEART:  +s1, s2 heart sounds, no murmurs  ABDOMEN:  Soft, mildly tender in epigastrium, no rebound, no guarding  EXTREMITIES:  Warm and well perfused, no edema  SKIN:  No rash  NEURO:   Awake, interactive, following commands    LABS:  CBC Full  -  ( 24 Feb 2018 08:44 )  WBC Count : 16.35 K/uL  Hemoglobin : 14.0 g/dL  Hematocrit : 43.1 %  Platelet Count - Automated : 382 K/uL  Mean Cell Volume : 83.7 fl  Auto Neutrophil # : 13.41 K/uL  Auto Neutrophil % : 82.0 %

## 2018-02-25 NOTE — PROGRESS NOTE ADULT - PROBLEM SELECTOR PLAN 6
-PT recs- patient is independent  -currently: FULL CODE  -prophylactic measure: SCD's for now, should ideally be on lovenox if possible. Cont pureed diet   Resume calorie count

## 2018-02-25 NOTE — PROGRESS NOTE ADULT - ATTENDING COMMENTS
-Patient discussed and seen/examined on 2/25/18. -Patient discussed and seen/examined on 2/25/18.  -Patient taking in some pureed diet. Symptoms mostly controlled. C/w incentive spirometer. CXR ordered for increased WBC's. C/w calorie counts.   -Per oncology notes, plan to transfer patient to [facility] tomorrow for beginning of chemo once a bed is open on [facility]. Plan to transfer tomorrow.   -Discussed with patient and his family at bedside.

## 2018-02-25 NOTE — PROGRESS NOTE ADULT - PROBLEM SELECTOR PLAN 3
- Most likely in setting of hepatic metastases.   - CTM on labs. Wbc ct uptrending   likely reactive + steroid induced   Cannot rule out infection, will repeat CXR due to concern for aspiration PNA, however unlikely given no other symptoms

## 2018-02-25 NOTE — PROGRESS NOTE ADULT - PROBLEM SELECTOR PLAN 1
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 CTAP shows no change from previous: Distal esophageal mass, diffuse pulmonary, mediastinal, hilar and hepatic metastatic disease  -continue w/ decadron  - Gastroenterology recs appreciated, EGD with stent placement on 2/22/2018.   - Onco recs appreciated, pending nutritional status improvement for chemo  - C/w with Morphine as needed, tolerating well.  - Zofran for nausea and vomiting. 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 CTAP shows no change from previous: Distal esophageal mass, diffuse pulmonary, mediastinal, hilar and hepatic metastatic disease  -continue w/ decadron  - Gastroenterology recs appreciated, EGD with stent placement on 2/22/2018.   - Onco recs appreciated, pending nutritional status improvement for chemo  - C/w with Morphine as needed, tolerating well.  - Zofran for nausea and vomiting.  -Plan to transfer to [facility] for chemo tomorrow if there is a bed.

## 2018-02-25 NOTE — PROGRESS NOTE ADULT - SUBJECTIVE AND OBJECTIVE BOX
Chief Complaint: esophageal ca    INTERVAL HPI/OVERNIGHT EVENTS: Daughter at bedside.  Pt s/p stent placement on 2/22. Reports occasional pain in substernal region that responds to pain medications.  Pain meds sedate him.  Able to take PO.   Was in chair and ambulated around room earlier.  Moved bowels yesterday with help of suppository.      Vital Signs Last 24 Hrs  T(C): 36.4 (25 Feb 2018 10:05), Max: 36.8 (24 Feb 2018 22:44)  T(F): 97.5 (25 Feb 2018 10:05), Max: 98.3 (25 Feb 2018 05:47)  HR: 83 (25 Feb 2018 10:05) (78 - 86)  BP: 118/75 (25 Feb 2018 10:05) (118/75 - 135/70)  BP(mean): --  RR: 18 (25 Feb 2018 10:05) (18 - 18)  SpO2: 97% (25 Feb 2018 10:05) (94% - 97%)    Physical Exam:  Gen:  awake and alert  HEENT:  MMM O/P clear, no mucositis, no icterus  Neck:  Supple, No LAD   Lungs:  clear  CVS:  S1 S2   Abd:  Soft NT/ND   Ext:  No edema  Skin:  No rash  Neuro:  grossly intact     LABS:                                   14.0   16.35 )-----------( 382      ( 24 Feb 2018 08:44 )             43.1     02-24    135  |  95<L>  |  23  ----------------------------<  185<H>  5.1   |  18<L>  |  0.72    Ca    9.3      24 Feb 2018 08:44  Phos  2.4     02-24  Mg     2.2     02-24

## 2018-02-26 LAB
ALBUMIN SERPL ELPH-MCNC: 2.5 G/DL — LOW (ref 3.3–5)
ALP SERPL-CCNC: 519 U/L — HIGH (ref 40–120)
ALT FLD-CCNC: 280 U/L — HIGH (ref 10–45)
ANION GAP SERPL CALC-SCNC: 20 MMOL/L — HIGH (ref 5–17)
AST SERPL-CCNC: 325 U/L — HIGH (ref 10–40)
BILIRUB DIRECT SERPL-MCNC: 2.4 MG/DL — HIGH (ref 0–0.2)
BILIRUB INDIRECT FLD-MCNC: 1 MG/DL — SIGNIFICANT CHANGE UP (ref 0.2–1)
BILIRUB SERPL-MCNC: 3.4 MG/DL — HIGH (ref 0.2–1.2)
BUN SERPL-MCNC: 20 MG/DL — SIGNIFICANT CHANGE UP (ref 7–23)
CALCIUM SERPL-MCNC: 9.1 MG/DL — SIGNIFICANT CHANGE UP (ref 8.4–10.5)
CHLORIDE SERPL-SCNC: 93 MMOL/L — LOW (ref 96–108)
CO2 SERPL-SCNC: 19 MMOL/L — LOW (ref 22–31)
CREAT SERPL-MCNC: 0.54 MG/DL — SIGNIFICANT CHANGE UP (ref 0.5–1.3)
GLUCOSE SERPL-MCNC: 172 MG/DL — HIGH (ref 70–99)
MAGNESIUM SERPL-MCNC: 2 MG/DL — SIGNIFICANT CHANGE UP (ref 1.6–2.6)
PHOSPHATE SERPL-MCNC: 2.1 MG/DL — LOW (ref 2.5–4.5)
POTASSIUM SERPL-MCNC: 4.8 MMOL/L — SIGNIFICANT CHANGE UP (ref 3.5–5.3)
POTASSIUM SERPL-SCNC: 4.8 MMOL/L — SIGNIFICANT CHANGE UP (ref 3.5–5.3)
PROT SERPL-MCNC: 5.7 G/DL — LOW (ref 6–8.3)
SODIUM SERPL-SCNC: 132 MMOL/L — LOW (ref 135–145)

## 2018-02-26 PROCEDURE — 99232 SBSQ HOSP IP/OBS MODERATE 35: CPT

## 2018-02-26 PROCEDURE — 99232 SBSQ HOSP IP/OBS MODERATE 35: CPT | Mod: GC

## 2018-02-26 PROCEDURE — 99233 SBSQ HOSP IP/OBS HIGH 50: CPT | Mod: GC

## 2018-02-26 RX ADMIN — ONDANSETRON 4 MILLIGRAM(S): 8 TABLET, FILM COATED ORAL at 08:11

## 2018-02-26 RX ADMIN — Medication 100 MILLIGRAM(S): at 05:30

## 2018-02-26 RX ADMIN — HYDROMORPHONE HYDROCHLORIDE 0.5 MILLIGRAM(S): 2 INJECTION INTRAMUSCULAR; INTRAVENOUS; SUBCUTANEOUS at 09:04

## 2018-02-26 RX ADMIN — HYDROMORPHONE HYDROCHLORIDE 0.5 MILLIGRAM(S): 2 INJECTION INTRAMUSCULAR; INTRAVENOUS; SUBCUTANEOUS at 03:39

## 2018-02-26 RX ADMIN — ONDANSETRON 4 MILLIGRAM(S): 8 TABLET, FILM COATED ORAL at 20:17

## 2018-02-26 RX ADMIN — ONDANSETRON 4 MILLIGRAM(S): 8 TABLET, FILM COATED ORAL at 13:53

## 2018-02-26 RX ADMIN — PANTOPRAZOLE SODIUM 40 MILLIGRAM(S): 20 TABLET, DELAYED RELEASE ORAL at 05:30

## 2018-02-26 RX ADMIN — HYDROMORPHONE HYDROCHLORIDE 0.5 MILLIGRAM(S): 2 INJECTION INTRAMUSCULAR; INTRAVENOUS; SUBCUTANEOUS at 23:06

## 2018-02-26 RX ADMIN — HYDROMORPHONE HYDROCHLORIDE 0.5 MILLIGRAM(S): 2 INJECTION INTRAMUSCULAR; INTRAVENOUS; SUBCUTANEOUS at 18:08

## 2018-02-26 RX ADMIN — HYDROMORPHONE HYDROCHLORIDE 0.5 MILLIGRAM(S): 2 INJECTION INTRAMUSCULAR; INTRAVENOUS; SUBCUTANEOUS at 23:36

## 2018-02-26 RX ADMIN — Medication 1: at 18:49

## 2018-02-26 RX ADMIN — HYDROMORPHONE HYDROCHLORIDE 0.5 MILLIGRAM(S): 2 INJECTION INTRAMUSCULAR; INTRAVENOUS; SUBCUTANEOUS at 09:20

## 2018-02-26 RX ADMIN — HYDROMORPHONE HYDROCHLORIDE 0.5 MILLIGRAM(S): 2 INJECTION INTRAMUSCULAR; INTRAVENOUS; SUBCUTANEOUS at 04:00

## 2018-02-26 RX ADMIN — Medication 1 TABLET(S): at 12:17

## 2018-02-26 RX ADMIN — Medication 62.5 MILLIMOLE(S): at 21:27

## 2018-02-26 RX ADMIN — Medication 1: at 09:03

## 2018-02-26 RX ADMIN — Medication 4 MILLIGRAM(S): at 12:17

## 2018-02-26 RX ADMIN — Medication 4 MILLIGRAM(S): at 21:27

## 2018-02-26 NOTE — PROGRESS NOTE ADULT - PROBLEM SELECTOR PLAN 2
- In setting of malignancy w/ metastatic disease.    - Further Nutrition recs once tolerating PO.  - monitor for refeeding   - Patient tolerating diet  -Calorie counts.  -C/w pureed diet. - In setting of malignancy w/ metastatic disease.    - Further Nutrition recs now that patient tolerating PO diet  - monitor BMP daily   - Patient tolerating diet  -Calorie counts.  -C/w pureed diet.

## 2018-02-26 NOTE — PROGRESS NOTE ADULT - SUBJECTIVE AND OBJECTIVE BOX
Chief Complaint: esophageal ca    INTERVAL HPI/OVERNIGHT EVENTS: Pt s/p stent placement on 2/22. Reports occasional pain in substernal region that responds to pain medications.  One episode of vomiting this morning.    Vital Signs Last 24 Hrs  T(C): 36.9 (26 Feb 2018 18:35), Max: 36.9 (26 Feb 2018 18:35)  T(F): 98.4 (26 Feb 2018 18:35), Max: 98.4 (26 Feb 2018 18:35)  HR: 85 (26 Feb 2018 18:35) (80 - 90)  BP: 108/74 (26 Feb 2018 18:35) (108/74 - 129/80)  BP(mean): --  RR: 18 (26 Feb 2018 18:35) (18 - 18)  SpO2: 97% (26 Feb 2018 18:35) (95% - 97%)    Physical Exam:  Gen:  awake and alert  HEENT:  MMM O/P clear  Neck:  Supple, No LAD   Lungs:  clear  CVS:  Normal S1 S2   Abd:  +BS, soft, mild tenderness in RUQ to palpation  Ext:  Trace LE edema  Skin:  No rash  Neuro:  grossly intact     LABS:    No CBC ordered             02-26    132<L>  |  93<L>  |  20  ----------------------------<  172<H>  4.8   |  19<L>  |  0.54    Ca    9.1      26 Feb 2018 09:24  Phos  2.1     02-26  Mg     2.0     02-26    TPro  5.7<L>  /  Alb  2.5<L>  /  TBili  3.4<H>  /  DBili  2.4<H>  /  AST  325<H>  /  ALT  280<H>  /  AlkPhos  519<H>  02-26

## 2018-02-26 NOTE — CHART NOTE - NSCHARTNOTEFT_GEN_A_CORE
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 Pt is S/P S/P EGD with stent placement 2/22.    Pt seen for malnutrition follow up. Pt's diet advanced to pureed (2/24), pt reports tolerating pureed consistency. Pt's calorie count restarted yesterday, day #1 550 kcal, 25gm protein including pt's self reported 1/2 Glucerna can. Pt reports appetite and intake have slowly improved, denies GI distress at this time. Pt reports emesis x1 this AM. BM 2 days ago. Pt reports he will try to eat breakfast and try Prosource supplement for first time.      PO intake:  < 50% [x ] 50-75% [ ]   % [ ]  other :     Source for PO intake [ x] Patient [ ] family [ ] chart [ ] staff [ ] other      Current Weight: Weight (kg): 53.5 (02-20 @ 19:45) No new weight to assess      Pertinent Medications: MEDICATIONS  (STANDING):  dexamethasone     Tablet 4 milliGRAM(s) Oral two times a day  dextrose 5% + sodium chloride 0.9%. 1000 milliLiter(s) (50 mL/Hr) IV Continuous <Continuous>  dextrose 5%. 1000 milliLiter(s) (50 mL/Hr) IV Continuous <Continuous>  dextrose 50% Injectable 12.5 Gram(s) IV Push once  dextrose 50% Injectable 25 Gram(s) IV Push once  dextrose 50% Injectable 25 Gram(s) IV Push once  docusate sodium 100 milliGRAM(s) Oral three times a day  insulin lispro (HumaLOG) corrective regimen sliding scale   SubCutaneous three times a day before meals  insulin lispro (HumaLOG) corrective regimen sliding scale   SubCutaneous at bedtime  multivitamin 1 Tablet(s) Oral daily  pantoprazole    Tablet 40 milliGRAM(s) Oral before breakfast  senna 2 Tablet(s) Oral at bedtime    MEDICATIONS  (PRN):  bisacodyl Suppository 10 milliGRAM(s) Rectal daily PRN Constipation  dextrose Gel 1 Dose(s) Oral once PRN Blood Glucose LESS THAN 70 milliGRAM(s)/deciliter  glucagon  Injectable 1 milliGRAM(s) IntraMuscular once PRN Glucose LESS THAN 70 milligrams/deciliter  HYDROmorphone  Injectable 0.5 milliGRAM(s) IV Push every 4 hours PRN Moderate Pain (4 - 6)  HYDROmorphone  Injectable 1 milliGRAM(s) IV Push every 4 hours PRN Severe Pain (7 - 10)  LORazepam   Injectable 0.5 milliGRAM(s) IV Push every 4 hours PRN Nausea and/or Vomiting  ondansetron Injectable 4 milliGRAM(s) IV Push every 6 hours PRN Nausea and/or Vomiting    Pertinent Labs:  02-26 Na132 mmol/L<L> Glu 172 mg/dL<H> K+ 4.8 mmol/L Cr  0.54 mg/dL BUN 20 mg/dL Phos 2.1 mg/dL<L> Alb 2.5 g/dL<L>       Skin: No edema. No pressure ulcers documented.     Estimated Needs:   [ x] no change since previous assessment  [ ] recalculated:       Previous Nutrition Diagnosis:    [x ] Malnutrition          Nutrition Diagnosis is [ x] ongoing  [ ] resolved [ ] not applicable          New Nutrition Diagnosis: [x ] not applicable        Recommend    [ x] Change Diet To: Continue with pureed diet as tolerated     [ x] Nutrition Supplement: Continue Glucerna, 3 per day (provides additional 660cal, 30gm protein) and 2 Nocarb Prosource (additional 120kcal, 30gm protein)     [ ] Nutrition Support    [ x] Other: 1.Continue to monitor weight, po intake, labs, and GI tolerance  2. Encourage adequate po intake   3. RD to follow up with completion of calorie count.       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 Pt is S/P S/P EGD with stent placement 2/22.    Pt seen for malnutrition follow up. Pt's diet advanced to pureed (2/24), pt reports tolerating pureed consistency. Pt's calorie count restarted yesterday, day #1 550 kcal, 25gm protein including pt's self reported 1/2 Glucerna can. Pt reports appetite and intake have slowly improved, denies GI distress at this time. Pt reports emesis x1 this AM. BM 2 days ago. Pt reports he will try to eat breakfast and try Prosource supplement for first time.      PO intake:  < 50% [x ] 50-75% [ ]   % [ ]  other :     Source for PO intake [ x] Patient [ ] family [ ] chart [ ] staff [ ] other      Current Weight: Weight (kg): 53.5 (02-20 @ 19:45) No new weight to assess      Pertinent Medications: MEDICATIONS  (STANDING):  dexamethasone     Tablet 4 milliGRAM(s) Oral two times a day  dextrose 5% + sodium chloride 0.9%. 1000 milliLiter(s) (50 mL/Hr) IV Continuous <Continuous>  dextrose 5%. 1000 milliLiter(s) (50 mL/Hr) IV Continuous <Continuous>  dextrose 50% Injectable 12.5 Gram(s) IV Push once  dextrose 50% Injectable 25 Gram(s) IV Push once  dextrose 50% Injectable 25 Gram(s) IV Push once  docusate sodium 100 milliGRAM(s) Oral three times a day  insulin lispro (HumaLOG) corrective regimen sliding scale   SubCutaneous three times a day before meals  insulin lispro (HumaLOG) corrective regimen sliding scale   SubCutaneous at bedtime  multivitamin 1 Tablet(s) Oral daily  pantoprazole    Tablet 40 milliGRAM(s) Oral before breakfast  senna 2 Tablet(s) Oral at bedtime    MEDICATIONS  (PRN):  bisacodyl Suppository 10 milliGRAM(s) Rectal daily PRN Constipation  dextrose Gel 1 Dose(s) Oral once PRN Blood Glucose LESS THAN 70 milliGRAM(s)/deciliter  glucagon  Injectable 1 milliGRAM(s) IntraMuscular once PRN Glucose LESS THAN 70 milligrams/deciliter  HYDROmorphone  Injectable 0.5 milliGRAM(s) IV Push every 4 hours PRN Moderate Pain (4 - 6)  HYDROmorphone  Injectable 1 milliGRAM(s) IV Push every 4 hours PRN Severe Pain (7 - 10)  LORazepam   Injectable 0.5 milliGRAM(s) IV Push every 4 hours PRN Nausea and/or Vomiting  ondansetron Injectable 4 milliGRAM(s) IV Push every 6 hours PRN Nausea and/or Vomiting    Pertinent Labs:  02-26 Na132 mmol/L<L> Glu 172 mg/dL<H> K+ 4.8 mmol/L Cr  0.54 mg/dL BUN 20 mg/dL Phos 2.1 mg/dL<L> Alb 2.5 g/dL<L>   Point of Care Testing BG: (2/26) 186, (2/25) 132-235    Skin: No edema. No pressure ulcers documented.     Estimated Needs:   [ x] no change since previous assessment  [ ] recalculated:       Previous Nutrition Diagnosis:    [x ] Malnutrition          Nutrition Diagnosis is [ x] ongoing  [ ] resolved [ ] not applicable          New Nutrition Diagnosis: [x ] not applicable        Recommend    [ x] Change Diet To: Continue with pureed diet as tolerated     [ x] Nutrition Supplement: Continue Glucerna, 3 per day (provides additional 660cal, 30gm protein) and 2 Nocarb Prosource (additional 120kcal, 30gm protein)     [ ] Nutrition Support    [ x] Other: 1.Continue to monitor weight, po intake, labs, and GI tolerance  2. Encourage adequate po intake   3. RD to follow up with completion of calorie count.       RD remains available, Denisha Tubbs RD pager #[phone]

## 2018-02-26 NOTE — PROGRESS NOTE ADULT - PROBLEM SELECTOR PLAN 4
- Most likely in setting of hepatic metastases.   - CTM on labs. - likely secondary to hepatic metastases, uptrending with total bilirubin 3.1 concerning for obstructive process  - will consider US abdomen especially since chemotherapy contraindicated with uptrending LFTs

## 2018-02-26 NOTE — PHYSICAL THERAPY INITIAL EVALUATION ADULT - PRECAUTIONS/LIMITATIONS, REHAB EVAL
As per patient, has been feeling weak, and has had two episodes of NBNB vomiting, w/ chronic abdominal pain which has been going on since his diagnosis from recent admission. Also states, he is feeling SOB for the last two days upon exertion. Pt had recent admission 2/6-2/8 where he was newly diagnosed metastatic w/ poorly differentiated adenocarcinoma s/p EGD w/ biopsy. He saw Dr. Ansari (oncologist) outpatient and recommended starting chemotherapy as per wife. Oncologist referred to radiation oncologist, Dr. Brooks who suggested radiation and Gamma Knife. Patient and wife also got second opinion from [facility] day of admission who recommended starting w/ chemotherapy first and possible esophageal stent placement. Patient and wife have not made further decisions regarding treatment yet.

## 2018-02-26 NOTE — PROGRESS NOTE ADULT - PROBLEM SELECTOR PLAN 1
Plan is for treatment with single agent Irinotecan this week with palliative intent.   Chemo nurse on [facility] aware.   Can give chemo on 2M or transferto 8M if bed available.  CMP today shows worsening LFTs with elevated bilirubin which is new and worsening transaminases.  Will repeat tomorrow, and would hold off on chemo if rising bilirubin.  Consider sonogram to evaluate.  PT evaluation; may need rehab.    Her 2 FISH negative. Foundation analysis is pending.

## 2018-02-26 NOTE — PROGRESS NOTE ADULT - SUBJECTIVE AND OBJECTIVE BOX
Patient is a 62y old  Male who presents with a chief complaint of Weakness, N/V, (22 Feb 2018 17:59)      SUBJECTIVE / OVERNIGHT EVENTS:    Bilious emesis x1 overnight after drinking water, otherwise, has been tolerating pureed diet well. Patient denies chest pain/burning, bloat/gas or diarrhea. No cough or fevers.     MEDICATIONS  (STANDING):  dexamethasone     Tablet 4 milliGRAM(s) Oral two times a day  dextrose 5% + sodium chloride 0.9%. 1000 milliLiter(s) (50 mL/Hr) IV Continuous <Continuous>  dextrose 5%. 1000 milliLiter(s) (50 mL/Hr) IV Continuous <Continuous>  dextrose 50% Injectable 12.5 Gram(s) IV Push once  dextrose 50% Injectable 25 Gram(s) IV Push once  dextrose 50% Injectable 25 Gram(s) IV Push once  docusate sodium 100 milliGRAM(s) Oral three times a day  insulin lispro (HumaLOG) corrective regimen sliding scale   SubCutaneous three times a day before meals  insulin lispro (HumaLOG) corrective regimen sliding scale   SubCutaneous at bedtime  multivitamin 1 Tablet(s) Oral daily  pantoprazole    Tablet 40 milliGRAM(s) Oral before breakfast  senna 2 Tablet(s) Oral at bedtime  sodium phosphate IVPB 15 milliMole(s) IV Intermittent once    MEDICATIONS  (PRN):  bisacodyl Suppository 10 milliGRAM(s) Rectal daily PRN Constipation  dextrose Gel 1 Dose(s) Oral once PRN Blood Glucose LESS THAN 70 milliGRAM(s)/deciliter  glucagon  Injectable 1 milliGRAM(s) IntraMuscular once PRN Glucose LESS THAN 70 milligrams/deciliter  HYDROmorphone  Injectable 0.5 milliGRAM(s) IV Push every 4 hours PRN Moderate Pain (4 - 6)  HYDROmorphone  Injectable 1 milliGRAM(s) IV Push every 4 hours PRN Severe Pain (7 - 10)  LORazepam   Injectable 0.5 milliGRAM(s) IV Push every 4 hours PRN Nausea and/or Vomiting  ondansetron Injectable 4 milliGRAM(s) IV Push every 6 hours PRN Nausea and/or Vomiting      CAPILLARY BLOOD GLUCOSE      POCT Blood Glucose.: 186 mg/dL (26 Feb 2018 08:32)  POCT Blood Glucose.: 132 mg/dL (25 Feb 2018 21:51)  POCT Blood Glucose.: 175 mg/dL (25 Feb 2018 17:51)    I&O's Summary    25 Feb 2018 07:01  -  26 Feb 2018 07:00  --------------------------------------------------------  IN: 1680 mL / OUT: 1395 mL / NET: 285 mL    26 Feb 2018 07:01  -  26 Feb 2018 13:29  --------------------------------------------------------  IN: 240 mL / OUT: 50 mL / NET: 190 mL    PHYSICAL EXAM:    Vital Signs Last 24 Hrs  T(C): 36.7 (26 Feb 2018 10:47), Max: 36.7 (25 Feb 2018 21:42)  T(F): 98 (26 Feb 2018 10:47), Max: 98 (25 Feb 2018 21:42)  HR: 85 (26 Feb 2018 10:47) (80 - 89)  BP: 117/80 (26 Feb 2018 10:47) (110/74 - 129/80)  BP(mean): --  RR: 18 (26 Feb 2018 10:47) (18 - 18)  SpO2: 95% (26 Feb 2018 10:47) (95% - 97%)    GENERAL: NAD, well-developed  HEAD:  Atraumatic, Normocephalic  EYES: EOMI, PERRLA, conjunctiva and sclera clear  NECK: Supple, No JVD  CHEST/LUNG: Clear to auscultation bilaterally; No wheeze  HEART: Regular rate and rhythm; No murmurs, rubs, or gallops  ABDOMEN: Soft, Nontender, Nondistended; Bowel sounds present  EXTREMITIES:  2+ Peripheral Pulses, No clubbing, cyanosis, or edema  PSYCH: AAOx3  NEUROLOGY: non-focal  SKIN: No rashes or lesions    LABS:    02-26    132<L>  |  93<L>  |  20  ----------------------------<  172<H>  4.8   |  19<L>  |  0.54    Ca    9.1      26 Feb 2018 09:24  Phos  2.1     02-26  Mg     2.0     02-26    TPro  5.7<L>  /  Alb  2.5<L>  /  TBili  3.4<H>  /  DBili  2.4<H>  /  AST  325<H>  /  ALT  280<H>  /  AlkPhos  519<H>  02-26      RADIOLOGY & ADDITIONAL TESTS:    Imaging Personally Reviewed: CXR (2/25): clear lungs    Consultant(s) Notes Reviewed:  Rheumatology, Nephrology     Care Discussed with Consultants/Other Providers: Patient is a 62y old  Male who presents with a chief complaint of Weakness, N/V, (22 Feb 2018 17:59)      SUBJECTIVE / OVERNIGHT EVENTS:    Bilious emesis x1 overnight after drinking water, otherwise, has been tolerating pureed diet well. Patient denies chest pain/burning, bloat/gas or diarrhea. No cough or fevers.     MEDICATIONS  (STANDING):  dexamethasone     Tablet 4 milliGRAM(s) Oral two times a day  dextrose 5% + sodium chloride 0.9%. 1000 milliLiter(s) (50 mL/Hr) IV Continuous <Continuous>  dextrose 5%. 1000 milliLiter(s) (50 mL/Hr) IV Continuous <Continuous>  dextrose 50% Injectable 12.5 Gram(s) IV Push once  dextrose 50% Injectable 25 Gram(s) IV Push once  dextrose 50% Injectable 25 Gram(s) IV Push once  docusate sodium 100 milliGRAM(s) Oral three times a day  insulin lispro (HumaLOG) corrective regimen sliding scale   SubCutaneous three times a day before meals  insulin lispro (HumaLOG) corrective regimen sliding scale   SubCutaneous at bedtime  multivitamin 1 Tablet(s) Oral daily  pantoprazole    Tablet 40 milliGRAM(s) Oral before breakfast  senna 2 Tablet(s) Oral at bedtime  sodium phosphate IVPB 15 milliMole(s) IV Intermittent once    MEDICATIONS  (PRN):  bisacodyl Suppository 10 milliGRAM(s) Rectal daily PRN Constipation  dextrose Gel 1 Dose(s) Oral once PRN Blood Glucose LESS THAN 70 milliGRAM(s)/deciliter  glucagon  Injectable 1 milliGRAM(s) IntraMuscular once PRN Glucose LESS THAN 70 milligrams/deciliter  HYDROmorphone  Injectable 0.5 milliGRAM(s) IV Push every 4 hours PRN Moderate Pain (4 - 6)  HYDROmorphone  Injectable 1 milliGRAM(s) IV Push every 4 hours PRN Severe Pain (7 - 10)  LORazepam   Injectable 0.5 milliGRAM(s) IV Push every 4 hours PRN Nausea and/or Vomiting  ondansetron Injectable 4 milliGRAM(s) IV Push every 6 hours PRN Nausea and/or Vomiting      CAPILLARY BLOOD GLUCOSE      POCT Blood Glucose.: 186 mg/dL (26 Feb 2018 08:32)  POCT Blood Glucose.: 132 mg/dL (25 Feb 2018 21:51)  POCT Blood Glucose.: 175 mg/dL (25 Feb 2018 17:51)    I&O's Summary    25 Feb 2018 07:01  -  26 Feb 2018 07:00  --------------------------------------------------------  IN: 1680 mL / OUT: 1395 mL / NET: 285 mL    26 Feb 2018 07:01  -  26 Feb 2018 13:29  --------------------------------------------------------  IN: 240 mL / OUT: 50 mL / NET: 190 mL    PHYSICAL EXAM:    Vital Signs Last 24 Hrs  T(C): 36.7 (26 Feb 2018 10:47), Max: 36.7 (25 Feb 2018 21:42)  T(F): 98 (26 Feb 2018 10:47), Max: 98 (25 Feb 2018 21:42)  HR: 85 (26 Feb 2018 10:47) (80 - 89)  BP: 117/80 (26 Feb 2018 10:47) (110/74 - 129/80)  BP(mean): --  RR: 18 (26 Feb 2018 10:47) (18 - 18)  SpO2: 95% (26 Feb 2018 10:47) (95% - 97%)    GENERAL: NAD, cachectic-appearing, no acute distress  HEAD:  Atraumatic, normocephalic  EYES: PERRLA, conjunctiva and sclera clear  NECK: Supple, No JVD  CHEST/LUNG: Clear to auscultation bilaterally; no wheeze or rales  HEART: Regular rate and rhythm; no murmurs, rubs, or gallops  ABDOMEN: Soft, bowel sounds diminished +RUQ & LUQ tenderness   EXTREMITIES:  2+ pedal pulses, no LE edema  PSYCH: AAOx3  NEUROLOGY: non-focal  SKIN: No rashes or lesions, non-jaundiced     LABS:    02-26    132<L>  |  93<L>  |  20  ----------------------------<  172<H>  4.8   |  19<L>  |  0.54    Ca    9.1      26 Feb 2018 09:24  Phos  2.1     02-26  Mg     2.0     02-26    TPro  5.7<L>  /  Alb  2.5<L>  /  TBili  3.4<H>  /  DBili  2.4<H>  /  AST  325<H>  /  ALT  280<H>  /  AlkPhos  519<H>  02-26      RADIOLOGY & ADDITIONAL TESTS:    Imaging Personally Reviewed: CXR (2/25): clear lungs    Consultant(s) Notes Reviewed:  Rheumatology, Nephrology     Care Discussed with Consultants/Other Providers:

## 2018-02-26 NOTE — PROGRESS NOTE ADULT - PROBLEM SELECTOR PLAN 3
Wbc ct uptrending   likely reactive + steroid induced   Cannot rule out infection, will repeat CXR due to concern for aspiration PNA, however unlikely given no other symptoms - likely reactive vs. steroid induced given PMNs predominant   - CXR wo consolidation, aspiration PNA less likely  - cont monitor on daily CBC

## 2018-02-26 NOTE — PROGRESS NOTE ADULT - ATTENDING COMMENTS
As above.    Pureed diet with liquid nutritional supplement.  No further advancement until outpatient followup with gastroenterology and medical oncology.  May eventually be able to tolerate a mechanical soft diet, depending on tolerance of chemotherapy/radiation treatment.  Aspiration precautions.

## 2018-02-26 NOTE — PROGRESS NOTE ADULT - PROBLEM SELECTOR PLAN 8
- Palliative care recs appreciated- FULL CODE - Palliative care discussion appreciated; patient and family would still like to pursue chemotherapy    KEKE Odell [id number]  Dept. Internal Medicine  Pager #[phone]

## 2018-02-26 NOTE — PHYSICAL THERAPY INITIAL EVALUATION ADULT - PLANNED THERAPY INTERVENTIONS, PT EVAL
Stair negotiation/gait training/strengthening/bed mobility training/transfer training/balance training

## 2018-02-26 NOTE — PHYSICAL THERAPY INITIAL EVALUATION ADULT - PREDICTED DURATION OF THERAPY (DAYS/WKS), PT EVAL
Patient is currently functioning at baseline (independent) and will be D/C from PT program at this time.
3-5wks

## 2018-02-26 NOTE — PROGRESS NOTE ADULT - ASSESSMENT
62 M w/ metastatic esophageal ca not yet on systemic therapy, progressive declining functional status, now with weight loss, dysphagia 2/2 tumor and FTT s/p esophageal stent placement

## 2018-02-26 NOTE — PROGRESS NOTE ADULT - ASSESSMENT
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

## 2018-02-26 NOTE — PHYSICAL THERAPY INITIAL EVALUATION ADULT - GENERAL OBSERVATIONS, REHAB EVAL
Pt received semi supine in bed +IV
Pt received seated on chair in [facility],

## 2018-02-26 NOTE — PHYSICAL THERAPY INITIAL EVALUATION ADULT - LIVES WITH, PROFILE
spouse/As per chart, patient is independent - lives with wife in house with 6 stairs to entrance.
spouse/As per chart, patient is independent - lives with wife in house with 6 stairs to entrance.

## 2018-02-26 NOTE — PROGRESS NOTE ADULT - PROBLEM SELECTOR PLAN 7
-PT recs initially- patient is independent. -PT re-eval now pending.   -currently: FULL CODE  -prophylactic measure: SCD's for now, should ideally be on lovenox if possible. - PT re-eval pending   - SCD's as HSQ contraindicated in setting of thrombocytopenia

## 2018-02-26 NOTE — PHYSICAL THERAPY INITIAL EVALUATION ADULT - PERTINENT HX OF CURRENT PROBLEM, REHAB EVAL
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

## 2018-02-26 NOTE — PROGRESS NOTE ADULT - PROBLEM SELECTOR PLAN 1
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 CTAP shows no change from previous: Distal esophageal mass, diffuse pulmonary, mediastinal, hilar and hepatic metastatic disease  -continue w/ decadron  - Gastroenterology recs appreciated, EGD with stent placement on 2/22/2018.   - Onco recs appreciated, pending nutritional status improvement for chemo  - C/w with Morphine as needed, tolerating well.  - Zofran for nausea and vomiting.  -Plan to transfer to [facility] for chemo tomorrow if there is a bed. - Poorly differentiated adenocarcinoma of the esophagus with extensive liver, pulm, brain metastasis and mediastinal/hilar lymphadenopathy; CT A/P unchanged from prior imaging  - cont Decadron 4 mg BID; s/p EGD with stent placement on 2/22, patient now tolerating pureed diet except for bilious emesis x1 today   - cont Dilaudid 0.5 q4 PRN for pain; Ativan 0.5 q4 PRN nausea   - transfer to 8 [facility] for chemotherapy on hold due to rising LFTs and total bilirubin  - monitor LFTs and f/u Oncology tomorrow

## 2018-02-26 NOTE — PROGRESS NOTE ADULT - ASSESSMENT
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 He p/w reduced oral intake and appetite.    Impression:  1) Stage IV Esophageal Adenocarcinoma - s/p 23 mm x 15 cm WallFlex partially covered stent placement (2/22/2018).   2) Elevated liver enzymes - likely due to metastatic liver disease    Plan:  - Continue pureed diet as tolerated  - Pain control per primary team  - Monitor electrolytes  - Avoid hepatotoxic agents

## 2018-02-26 NOTE — PROGRESS NOTE ADULT - PROBLEM SELECTOR PLAN 5
- C/w ISS - possible risk for steroid-induced hyperglycemia given patient on Decadron for esophageal carcinoma

## 2018-02-26 NOTE — PHYSICAL THERAPY INITIAL EVALUATION ADULT - ADDITIONAL COMMENTS
2/19 CT ABDOMEN/PELVIS: No significant interval change since 2/6/2018. Distal esophageal mass, diffuse pulmonary, mediastinal, hilar and hepatic metastatic disease, not significantly changed since 2/6/2018. Scattered subcutaneous nodules in the right chest and ventral abdominal wall and left adrenal nodule, suspicious for metastases.  2/19 XR CHEST: Multiple large bilateral rounded opacities throughout the perihilar region likely representing necrotic lymph nodes consistent with the patient's history of extensive metastatic cancer. No change when compared to previous study done February 6, 2018.
2/19 CT ABDOMEN/PELVIS: No significant interval change since 2/6/2018. Distal esophageal mass, diffuse pulmonary, mediastinal, hilar and hepatic metastatic disease, not significantly changed since 2/6/2018. Scattered subcutaneous nodules in the right chest and ventral abdominal wall and left adrenal nodule, suspicious for metastases.  2/19 XR CHEST: Multiple large bilateral rounded opacities throughout the perihilar region likely representing necrotic lymph nodes consistent with the patient's history of extensive metastatic cancer. No change when compared to previous study done February 6, 2018.

## 2018-02-26 NOTE — PROGRESS NOTE ADULT - SUBJECTIVE AND OBJECTIVE BOX
SUBJECTIVE:  - Pt reports mild epigastric/chest pain  - He is tolerating most of his oral intake  - He had one episode of bilious emesis this morning  - No fever or chills    OBJECTIVE:    Vital Signs Last 24 Hrs  T(C): 36.3 (26 Feb 2018 05:35), Max: 36.7 (25 Feb 2018 21:42)  T(F): 97.4 (26 Feb 2018 05:35), Max: 98 (25 Feb 2018 21:42)  HR: 89 (26 Feb 2018 05:35) (80 - 89)  BP: 124/77 (26 Feb 2018 05:35) (110/74 - 129/80)  BP(mean): --  RR: 18 (26 Feb 2018 05:35) (18 - 18)  SpO2: 95% (26 Feb 2018 05:35) (95% - 97%)    PHYSICAL EXAM:  Constitutional: no acute distress  Eyes: no icterus  Neck: no masses, no LAD  Respiratory: normal inspiratory effort; no wheezing or crackles  Cardiovascular: RRR, normal S1/S2, no murmurs/rubs/gallops  Gastrointestinal: soft, nondistended, nontender, +BS  Extremities: no LE edema  Neurological: AAOx3, no asterixis  Skin: no rashes, bruises, petechiae    LABS:  pending

## 2018-02-26 NOTE — PROGRESS NOTE ADULT - ATTENDING COMMENTS
Had a small amount of emesis this morning, otherwise tolerating soft diet.   Continue with pain control and antiemetics.  Plan is for palliative chemotherapy early this week with irinotecan per heme note, will need to confirm - bilirubin 3.4.

## 2018-02-27 PROBLEM — R13.10 DYSPHAGIA: Status: ACTIVE | Noted: 2018-02-27

## 2018-02-27 PROBLEM — R05 COUGH: Status: ACTIVE | Noted: 2018-02-13

## 2018-02-27 PROBLEM — Z98.890 HISTORY OF PRIOR ABLATION TREATMENT: Status: ACTIVE | Noted: 2018-02-27

## 2018-02-27 PROBLEM — C15.5 MALIGNANT NEOPLASM OF LOWER THIRD OF ESOPHAGUS: Status: ACTIVE | Noted: 2018-02-14

## 2018-02-27 PROBLEM — C15.9 ESOPHAGEAL CANCER: Status: ACTIVE | Noted: 2018-02-13

## 2018-02-27 LAB
ALBUMIN SERPL ELPH-MCNC: 2.3 G/DL — LOW (ref 3.3–5)
ALP SERPL-CCNC: 577 U/L — HIGH (ref 40–120)
ALT FLD-CCNC: 309 U/L — HIGH (ref 10–45)
ANION GAP SERPL CALC-SCNC: 20 MMOL/L — HIGH (ref 5–17)
AST SERPL-CCNC: 348 U/L — HIGH (ref 10–40)
BILIRUB DIRECT SERPL-MCNC: 3 MG/DL — HIGH (ref 0–0.2)
BILIRUB INDIRECT FLD-MCNC: 1.5 MG/DL — HIGH (ref 0.2–1)
BILIRUB SERPL-MCNC: 4.5 MG/DL — HIGH (ref 0.2–1.2)
BUN SERPL-MCNC: 21 MG/DL — SIGNIFICANT CHANGE UP (ref 7–23)
CALCIUM SERPL-MCNC: 9.1 MG/DL — SIGNIFICANT CHANGE UP (ref 8.4–10.5)
CHLORIDE SERPL-SCNC: 96 MMOL/L — SIGNIFICANT CHANGE UP (ref 96–108)
CO2 SERPL-SCNC: 18 MMOL/L — LOW (ref 22–31)
CREAT SERPL-MCNC: 0.47 MG/DL — LOW (ref 0.5–1.3)
GLUCOSE SERPL-MCNC: 186 MG/DL — HIGH (ref 70–99)
HCT VFR BLD CALC: 41.4 % — SIGNIFICANT CHANGE UP (ref 39–50)
HGB BLD-MCNC: 13.9 G/DL — SIGNIFICANT CHANGE UP (ref 13–17)
MAGNESIUM SERPL-MCNC: 2 MG/DL — SIGNIFICANT CHANGE UP (ref 1.6–2.6)
MCHC RBC-ENTMCNC: 27.5 PG — SIGNIFICANT CHANGE UP (ref 27–34)
MCHC RBC-ENTMCNC: 33.6 GM/DL — SIGNIFICANT CHANGE UP (ref 32–36)
MCV RBC AUTO: 81.8 FL — SIGNIFICANT CHANGE UP (ref 80–100)
PHOSPHATE SERPL-MCNC: 2.6 MG/DL — SIGNIFICANT CHANGE UP (ref 2.5–4.5)
PLATELET # BLD AUTO: 303 K/UL — SIGNIFICANT CHANGE UP (ref 150–400)
POTASSIUM SERPL-MCNC: 5 MMOL/L — SIGNIFICANT CHANGE UP (ref 3.5–5.3)
POTASSIUM SERPL-SCNC: 5 MMOL/L — SIGNIFICANT CHANGE UP (ref 3.5–5.3)
PROT SERPL-MCNC: 5.8 G/DL — LOW (ref 6–8.3)
RBC # BLD: 5.06 M/UL — SIGNIFICANT CHANGE UP (ref 4.2–5.8)
RBC # FLD: 17.6 % — HIGH (ref 10.3–14.5)
SODIUM SERPL-SCNC: 134 MMOL/L — LOW (ref 135–145)
WBC # BLD: 17.41 K/UL — HIGH (ref 3.8–10.5)
WBC # FLD AUTO: 17.41 K/UL — HIGH (ref 3.8–10.5)

## 2018-02-27 PROCEDURE — 99233 SBSQ HOSP IP/OBS HIGH 50: CPT | Mod: GC

## 2018-02-27 PROCEDURE — 93010 ELECTROCARDIOGRAM REPORT: CPT

## 2018-02-27 PROCEDURE — 76705 ECHO EXAM OF ABDOMEN: CPT | Mod: 26,RT

## 2018-02-27 RX ORDER — METOCLOPRAMIDE HCL 10 MG
10 TABLET ORAL EVERY 6 HOURS
Qty: 0 | Refills: 0 | Status: COMPLETED | OUTPATIENT
Start: 2018-02-27 | End: 2018-02-27

## 2018-02-27 RX ORDER — DEXAMETHASONE 0.5 MG/5ML
4 ELIXIR ORAL
Qty: 0 | Refills: 0 | Status: DISCONTINUED | OUTPATIENT
Start: 2018-02-27 | End: 2018-03-02

## 2018-02-27 RX ORDER — METOCLOPRAMIDE HCL 10 MG
10 TABLET ORAL EVERY 6 HOURS
Qty: 0 | Refills: 0 | Status: DISCONTINUED | OUTPATIENT
Start: 2018-02-27 | End: 2018-02-27

## 2018-02-27 RX ADMIN — Medication 100 MILLIGRAM(S): at 05:59

## 2018-02-27 RX ADMIN — HYDROMORPHONE HYDROCHLORIDE 0.5 MILLIGRAM(S): 2 INJECTION INTRAMUSCULAR; INTRAVENOUS; SUBCUTANEOUS at 05:18

## 2018-02-27 RX ADMIN — HYDROMORPHONE HYDROCHLORIDE 0.5 MILLIGRAM(S): 2 INJECTION INTRAMUSCULAR; INTRAVENOUS; SUBCUTANEOUS at 04:48

## 2018-02-27 RX ADMIN — Medication 4 MILLIGRAM(S): at 14:05

## 2018-02-27 RX ADMIN — ONDANSETRON 4 MILLIGRAM(S): 8 TABLET, FILM COATED ORAL at 09:27

## 2018-02-27 RX ADMIN — SODIUM CHLORIDE 50 MILLILITER(S): 9 INJECTION, SOLUTION INTRAVENOUS at 18:13

## 2018-02-27 RX ADMIN — ONDANSETRON 4 MILLIGRAM(S): 8 TABLET, FILM COATED ORAL at 03:04

## 2018-02-27 RX ADMIN — SENNA PLUS 2 TABLET(S): 8.6 TABLET ORAL at 21:54

## 2018-02-27 RX ADMIN — Medication 1: at 16:11

## 2018-02-27 RX ADMIN — HYDROMORPHONE HYDROCHLORIDE 1 MILLIGRAM(S): 2 INJECTION INTRAMUSCULAR; INTRAVENOUS; SUBCUTANEOUS at 20:06

## 2018-02-27 RX ADMIN — Medication 100 MILLIGRAM(S): at 21:54

## 2018-02-27 RX ADMIN — Medication 10 MILLIGRAM(S): at 18:57

## 2018-02-27 RX ADMIN — HYDROMORPHONE HYDROCHLORIDE 1 MILLIGRAM(S): 2 INJECTION INTRAMUSCULAR; INTRAVENOUS; SUBCUTANEOUS at 19:46

## 2018-02-27 NOTE — PROGRESS NOTE ADULT - PROBLEM SELECTOR PLAN 7
- PT re-eval pending   - SCD's as HSQ contraindicated in setting of thrombocytopenia - PT - home with home PT.   - SCD's as HSQ contraindicated in setting of thrombocytopenia

## 2018-02-27 NOTE — PROGRESS NOTE ADULT - PROBLEM SELECTOR PLAN 3
- likely reactive vs. steroid induced given PMNs predominant   - CXR wo consolidation, aspiration PNA less likely  - cont monitor on daily CBC

## 2018-02-27 NOTE — CHART NOTE - NSCHARTNOTEFT_GEN_A_CORE
Pt seen for calorie count follow up. Medical chart reviewed, events noted. Pt reports poor appetite, emesis x2 since last night.  Per food intake record  + Pt's report of 1/2 can of Glucerna (additional 110 mikal, 5 gm protein), calorie count day two:  670kcal, 40gm protein   Today is final day of calorie count, RD to follow up.     Denisha Tubbs, RD pager #[phone]

## 2018-02-27 NOTE — PROGRESS NOTE ADULT - PROBLEM SELECTOR PLAN 1
Plan was for treatment with single agent Irinotecan this week with palliative intent.   CMP today shows worsening LFTs with progressively rising bilirubin.  Sonogram shows diffuse involvement in liver but no evidence of obstruction which could be stented or drained.  Prognosis poor. Discussed with family and medical team.  Will check labs tomorrow but if LFTs continue to rise he will not be a candidate for any treatment and would benefit most from palliative care.  May need inpatient hospice to manage both pain and nausea.  Recommend adding IV Reglan given persistent N/V on Zofran.  Will follow.

## 2018-02-27 NOTE — PROGRESS NOTE ADULT - PROBLEM SELECTOR PLAN 8
- Palliative care discussion appreciated; patient and family would still like to pursue chemotherapy    KEKE Odell MD-PGY2  Dept. Internal Medicine  Pager #[phone] - Palliative care discussion appreciated; on going discussion given poor prognosis.

## 2018-02-27 NOTE — PROGRESS NOTE ADULT - SUBJECTIVE AND OBJECTIVE BOX
Patient is a 62y old  Male who presents with a chief complaint of Weakness, N/V, (22 Feb 2018 17:59)    SUBJECTIVE / OVERNIGHT EVENTS:  Patient endorsed vomiting brownish fluid around 10pm, previously drank chocolate Glucerna, patient also vomited around 3am greenish fluid, both were non-bloody. Patient endorsed on and off nausea, epigastric pain that comes and goes, improves with medication. Patient endorsed mild sob, and that last bowel movement was on friday. Patient denied fever, chills, chest pain, palpitations, dizziness, lightheadiness, blurry vision.     MEDICATIONS  (STANDING):  dexamethasone     Tablet 4 milliGRAM(s) Oral two times a day  dextrose 5% + sodium chloride 0.9%. 1000 milliLiter(s) (50 mL/Hr) IV Continuous <Continuous>  dextrose 5%. 1000 milliLiter(s) (50 mL/Hr) IV Continuous <Continuous>  dextrose 50% Injectable 12.5 Gram(s) IV Push once  dextrose 50% Injectable 25 Gram(s) IV Push once  dextrose 50% Injectable 25 Gram(s) IV Push once  docusate sodium 100 milliGRAM(s) Oral three times a day  insulin lispro (HumaLOG) corrective regimen sliding scale   SubCutaneous three times a day before meals  insulin lispro (HumaLOG) corrective regimen sliding scale   SubCutaneous at bedtime  multivitamin 1 Tablet(s) Oral daily  pantoprazole    Tablet 40 milliGRAM(s) Oral before breakfast  senna 2 Tablet(s) Oral at bedtime    MEDICATIONS  (PRN):  bisacodyl Suppository 10 milliGRAM(s) Rectal daily PRN Constipation  dextrose Gel 1 Dose(s) Oral once PRN Blood Glucose LESS THAN 70 milliGRAM(s)/deciliter  glucagon  Injectable 1 milliGRAM(s) IntraMuscular once PRN Glucose LESS THAN 70 milligrams/deciliter  HYDROmorphone  Injectable 0.5 milliGRAM(s) IV Push every 4 hours PRN Moderate Pain (4 - 6)  HYDROmorphone  Injectable 1 milliGRAM(s) IV Push every 4 hours PRN Severe Pain (7 - 10)  LORazepam   Injectable 0.5 milliGRAM(s) IV Push every 4 hours PRN Nausea and/or Vomiting  ondansetron Injectable 4 milliGRAM(s) IV Push every 6 hours PRN Nausea and/or Vomiting    Vital Signs Last 24 Hrs  T(C): 36.7 (27 Feb 2018 06:26), Max: 36.9 (26 Feb 2018 18:35)  T(F): 98 (27 Feb 2018 06:26), Max: 98.4 (26 Feb 2018 18:35)  HR: 85 (27 Feb 2018 06:26) (82 - 90)  BP: 117/81 (27 Feb 2018 06:26) (108/74 - 127/86)  RR: 18 (27 Feb 2018 06:26) (18 - 18)  SpO2: 96% (27 Feb 2018 06:26) (95% - 97%)  CAPILLARY BLOOD GLUCOSE    POCT Blood Glucose.: 149 mg/dL (26 Feb 2018 22:12)  POCT Blood Glucose.: 173 mg/dL (26 Feb 2018 18:34)  POCT Blood Glucose.: 177 mg/dL (26 Feb 2018 14:39)    I&O's Summary    26 Feb 2018 07:01  -  27 Feb 2018 07:00  --------------------------------------------------------  IN: 1090 mL / OUT: 575 mL / NET: 515 mL    27 Feb 2018 07:01  -  27 Feb 2018 09:12  --------------------------------------------------------  IN: 0 mL / OUT: 400 mL / NET: -400 mL      PHYSICAL EXAM:  GENERAL: NAD, well-developed  HEAD:  Atraumatic, Normocephalic  EYES: EOMI, PERRLA, conjunctiva and sclera clear  NECK: Supple, No JVD  CHEST/LUNG: Decreased breath sounds to the right side other wise clear to auscultation; No wheeze  HEART: Regular rate and rhythm; Normal S1 S2, No murmurs, rubs, or gallops  ABDOMEN: Soft, tenderness to epigastric area, Nondistended; Bowel sounds decreaed   EXTREMITIES:  2+ Peripheral Pulses, No clubbing, cyanosis, or edema  PSYCH: AAOx3  NEUROLOGY: non-focal  SKIN: No rashes or lesions    LABS:                        13.9   17.41 )-----------( 303      ( 27 Feb 2018 07:46 )             41.4     02-26    132<L>  |  93<L>  |  20  ----------------------------<  172<H>  4.8   |  19<L>  |  0.54    Ca    9.1      26 Feb 2018 09:24  Phos  2.1     02-26  Mg     2.0     02-26    TPro  5.7<L>  /  Alb  2.5<L>  /  TBili  3.4<H>  /  DBili  2.4<H>  /  AST  325<H>  /  ALT  280<H>  /  AlkPhos  519<H>  02-26  Upper Endoscopy (02.22.18 @ 13:12) >  Findings:       A partially obstructing, circumferential fungating mass was found in the lower third of the        esophagus. The proximal end of the mass was measured at 31 cm from the incisors and extended        to the GE junction at 38 cm from the incisors. There was mild resistance passing the regular        upper endoscope through the lesion. Two radio opaque markers were placed on the patient using        flouroscopic and endoscopic guidance and a guidewire was advanced into the stomach. The        esophagus was stented with a 23 mm x 15 cm WallFlex partially covered stent under        fluoroscopic guidance. Re-look endoscopy showed appropriate placement of the stent in the        esophagus.                                                                                                        Impression:          - Partially obstructing, malignant esophageal tumor was found in the lower       third of the esophagus s/p successful deployment of esophageal stent.  Recommendation:      - [facility].                       - Pateint may experience chest pain as the stent expands over the next 24-48                      hours. Use prn pain meds.                       - Full liquid diet today, then advance to low residue diet.                                                                                                        Attending Participation:       I was present and participated during the entire procedure, including non-key portions.        RADIOLOGY & ADDITIONAL TESTS:    Imaging Personally Reviewed:    Consultant(s) Notes Reviewed:  GI, Onco     Care Discussed with Consultants/Other Providers: Patient is a 62y old  Male who presents with a chief complaint of Weakness, N/V, (22 Feb 2018 17:59)    SUBJECTIVE / OVERNIGHT EVENTS:  Patient endorsed vomiting brownish fluid around 10pm, previously drank chocolate Glucerna, patient also vomited around 3am greenish fluid, both were non-bloody. Patient endorsed on and off nausea, epigastric pain that comes and goes, improves with medication. Patient endorsed mild sob, and that last bowel movement was on friday. Patient denied fever, chills, chest pain, palpitations, dizziness, lightheadiness, blurry vision.     MEDICATIONS  (STANDING):  dexamethasone     Tablet 4 milliGRAM(s) Oral two times a day  dextrose 5% + sodium chloride 0.9%. 1000 milliLiter(s) (50 mL/Hr) IV Continuous <Continuous>  dextrose 5%. 1000 milliLiter(s) (50 mL/Hr) IV Continuous <Continuous>  dextrose 50% Injectable 12.5 Gram(s) IV Push once  dextrose 50% Injectable 25 Gram(s) IV Push once  dextrose 50% Injectable 25 Gram(s) IV Push once  docusate sodium 100 milliGRAM(s) Oral three times a day  insulin lispro (HumaLOG) corrective regimen sliding scale   SubCutaneous three times a day before meals  insulin lispro (HumaLOG) corrective regimen sliding scale   SubCutaneous at bedtime  multivitamin 1 Tablet(s) Oral daily  pantoprazole    Tablet 40 milliGRAM(s) Oral before breakfast  senna 2 Tablet(s) Oral at bedtime    MEDICATIONS  (PRN):  bisacodyl Suppository 10 milliGRAM(s) Rectal daily PRN Constipation  dextrose Gel 1 Dose(s) Oral once PRN Blood Glucose LESS THAN 70 milliGRAM(s)/deciliter  glucagon  Injectable 1 milliGRAM(s) IntraMuscular once PRN Glucose LESS THAN 70 milligrams/deciliter  HYDROmorphone  Injectable 0.5 milliGRAM(s) IV Push every 4 hours PRN Moderate Pain (4 - 6)  HYDROmorphone  Injectable 1 milliGRAM(s) IV Push every 4 hours PRN Severe Pain (7 - 10)  LORazepam   Injectable 0.5 milliGRAM(s) IV Push every 4 hours PRN Nausea and/or Vomiting  ondansetron Injectable 4 milliGRAM(s) IV Push every 6 hours PRN Nausea and/or Vomiting    Vital Signs Last 24 Hrs  T(C): 36.7 (27 Feb 2018 06:26), Max: 36.9 (26 Feb 2018 18:35)  T(F): 98 (27 Feb 2018 06:26), Max: 98.4 (26 Feb 2018 18:35)  HR: 85 (27 Feb 2018 06:26) (82 - 90)  BP: 117/81 (27 Feb 2018 06:26) (108/74 - 127/86)  RR: 18 (27 Feb 2018 06:26) (18 - 18)  SpO2: 96% (27 Feb 2018 06:26) (95% - 97%)  CAPILLARY BLOOD GLUCOSE    POCT Blood Glucose.: 149 mg/dL (26 Feb 2018 22:12)  POCT Blood Glucose.: 173 mg/dL (26 Feb 2018 18:34)  POCT Blood Glucose.: 177 mg/dL (26 Feb 2018 14:39)    I&O's Summary    26 Feb 2018 07:01  -  27 Feb 2018 07:00  --------------------------------------------------------  IN: 1090 mL / OUT: 575 mL / NET: 515 mL    27 Feb 2018 07:01  -  27 Feb 2018 09:12  --------------------------------------------------------  IN: 0 mL / OUT: 400 mL / NET: -400 mL      PHYSICAL EXAM:  GENERAL: NAD, well-developed  HEAD:  Atraumatic, Normocephalic  EYES: EOMI, PERRLA, conjunctiva and sclera clear  NECK: Supple, No JVD  CHEST/LUNG: Decreased breath sounds to the right side other wise clear to auscultation; No wheeze  HEART: Regular rate and rhythm; Normal S1 S2, No murmurs, rubs, or gallops  ABDOMEN: Soft, tenderness to epigastric area, Nondistended; Bowel sounds decreaed   EXTREMITIES:  2+ Peripheral Pulses, No clubbing, cyanosis, or edema  PSYCH: AAOx3  NEUROLOGY: non-focal  SKIN: No rashes or lesions    LABS:                        13.9   17.41 )-----------( 303      ( 27 Feb 2018 07:46 )             41.4     02-26    132<L>  |  93<L>  |  20  ----------------------------<  172<H>  4.8   |  19<L>  |  0.54    Ca    9.1      26 Feb 2018 09:24  Phos  2.1     02-26  Mg     2.0     02-26    TPro  5.7<L>  /  Alb  2.5<L>  /  TBili  3.4<H>  /  DBili  2.4<H>  /  AST  325<H>  /  ALT  280<H>  /  AlkPhos  519<H>  02-26                        13.9   17.41 )-----------( 303      ( 27 Feb 2018 07:46 )             41.4     Hgb Trend: 13.9<--, 14.0<--, 15.0<--, 13.4<--, 13.0<--  02-27    134<L>  |  96  |  21  ----------------------------<  186<H>  5.0   |  18<L>  |  0.47<L>    Ca    9.1      27 Feb 2018 09:27  Phos  2.6     02-27  Mg     2.0     02-27    TPro  5.8<L>  /  Alb  2.3<L>  /  TBili  4.5<H>  /  DBili  3.0<H>  /  AST  348<H>  /  ALT  309<H>  /  AlkPhos  577<H>  02-27    Creatinine Trend: 0.47<--, 0.54<--, 0.72<--, 0.61<--, 0.76<--, 0.66<--    Imaging reviewed personally.    Upper Endoscopy (02.22.18 @ 13:12) >  Findings:       A partially obstructing, circumferential fungating mass was found in the lower third of the        esophagus. The proximal end of the mass was measured at 31 cm from the incisors and extended        to the GE junction at 38 cm from the incisors. There was mild resistance passing the regular        upper endoscope through the lesion. Two radio opaque markers were placed on the patient using        flouroscopic and endoscopic guidance and a guidewire was advanced into the stomach. The        esophagus was stented with a 23 mm x 15 cm WallFlex partially covered stent under        fluoroscopic guidance. Re-look endoscopy showed appropriate placement of the stent in the        esophagus.                                                                                                        Impression:          - Partially obstructing, malignant esophageal tumor was found in the lower       third of the esophagus s/p successful deployment of esophageal stent.  Recommendation:      - [facility] to [facility] for [facility].                       - Pateint may experience chest pain as the stent expands over the next 24-48                      hours. Use prn pain meds.                       - Full liquid diet today, then advance to low residue diet.                                                                                                        Attending Participation:       I was present and participated during the entire procedure, including non-key portions.    US Abdomen Upper Quadrant Right (02.27.18 @ 15:02) >  FINDINGS:  Liver: Enlarged and diffusely replaced by metastatic disease as noted on   CT.  Bile ducts: Normal caliber. Common bile duct measures 1 mm.   Gallbladder: Within normal limits.      Pancreas: Visualized portions are within normal limits. Limited   evaluation of the pancreatic head and tail.  Right kidney: 10.4 cm. No hydronephrosis.   Ascites: Trace perihepatic ascites.  IVC: Visualized portions are within normal limits.  Miscellaneous: Small right pleural effusion.    IMPRESSION:   Hepatomegaly, with diffuse metastatic disease to the liver as noted on   prior CT.  No biliary ductal dilatation.  Trace perihepatic ascites and small right pleural effusion.    RADIOLOGY & ADDITIONAL TESTS:    Imaging Personally Reviewed:    Consultant(s) Notes Reviewed:  GI, Onco     Care Discussed with Consultants/Other Providers:

## 2018-02-27 NOTE — PROGRESS NOTE ADULT - ATTENDING COMMENTS
With uptrending bilirubin and LFTs, and ongoing nausea/vomiting, will need to evaluate for obstructive cholestasis.   RUQ ordered, f/u results.   Holding chemo due to transaminits. With uptrending bilirubin and LFTs, and ongoing nausea/vomiting, will need to evaluate for obstructive cholestasis.   RUQ ordered, f/u results.   Holding chemo due to transaminits.  Leukocytosis likely from decadron, patient remains afebrile, hold off on abx for now.

## 2018-02-27 NOTE — PROGRESS NOTE ADULT - ASSESSMENT
62 M w/ metastatic esophageal ca not yet on systemic therapy, rapidly declining functional status, now with weight loss, dysphagia 2/2 tumor and FTT s/p esophageal stent placement

## 2018-02-27 NOTE — PROGRESS NOTE ADULT - PROBLEM SELECTOR PLAN 2
- In setting of malignancy w/ metastatic disease.    - Further Nutrition recs now that patient tolerating PO diet  - monitor BMP daily   - Patient tolerating diet  -Calorie counts.  -C/w pureed diet.

## 2018-02-27 NOTE — PROGRESS NOTE ADULT - ASSESSMENT
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

## 2018-02-27 NOTE — PROGRESS NOTE ADULT - PROBLEM SELECTOR PLAN 4
- likely secondary to hepatic metastases, uptrending with total bilirubin 3.1 concerning for obstructive process  - will consider US abdomen especially since chemotherapy contraindicated with uptrending LFTs - likely secondary to hepatic metastases, uptrending with total bilirubin 3.1 concerning for obstructive process  - Abdominal ultrasound, no duct dilation.

## 2018-02-27 NOTE — PROGRESS NOTE ADULT - PROBLEM SELECTOR PLAN 1
- Poorly differentiated adenocarcinoma of the esophagus with extensive liver, pulm, brain metastasis and mediastinal/hilar lymphadenopathy; CT A/P unchanged from prior imaging  - cont Decadron 4 mg BID; s/p EGD with stent placement on 2/22, patient now tolerating pureed diet except for bilious emesis x1 today   - cont Dilaudid 0.5 q4 PRN for pain; Ativan 0.5 q4 PRN nausea   - transfer to 8 MON for chemotherapy on hold due to rising LFTs and total bilirubin  - monitor LFTs and f/u Oncology tomorrow - Poorly differentiated adenocarcinoma of the esophagus with extensive liver, pulm, brain metastasis and mediastinal/hilar lymphadenopathy; CT A/P unchanged from prior imaging  - cont Decadron 4 mg BID; s/p EGD with stent placement on 2/22  - cont Dilaudid 0.5 q4 PRN for pain   - Onc recs appreciated, Reglan 10mg IV q6h PRN for nausea, Zofran 4mg IV q6h PRN for nausea  - transfer to 8 MON for chemotherapy on hold due to rising LFTs and total bilirubin  - monitor LFTs and f/u Oncology tomorrow

## 2018-02-27 NOTE — PROGRESS NOTE ADULT - PROBLEM SELECTOR PLAN 5
- possible risk for steroid-induced hyperglycemia given patient on Decadron for esophageal carcinoma

## 2018-02-27 NOTE — PROGRESS NOTE ADULT - SUBJECTIVE AND OBJECTIVE BOX
Chief Complaint: esophageal ca    INTERVAL HPI/OVERNIGHT EVENTS: Pt s/p stent placement on 2/22. Increased N/V today with at least 4 episodes. Also c/o pain relieved with hydromorphone but then increased nausea with med    Vital Signs Last 24 Hrs  T(C): 36.6 (27 Feb 2018 15:30), Max: 36.9 (26 Feb 2018 18:35)  T(F): 97.8 (27 Feb 2018 15:30), Max: 98.4 (26 Feb 2018 18:35)  HR: 92 (27 Feb 2018 15:20) (82 - 93)  BP: 131/86 (27 Feb 2018 15:20) (108/74 - 131/86)  BP(mean): --  RR: 18 (27 Feb 2018 15:20) (18 - 18)  SpO2: 95% (27 Feb 2018 15:20) (95% - 97%)    Physical Exam:  Gen:  awake and alert  HEENT:  MMM O/P clear, temporal wasting  Neck:  Supple, No LAD   Lungs:  clear  CVS:  Normal S1 S2   Abd:  +BS, soft, mild tenderness in RUQ to palpation  Ext:  Trace LE edema  Skin:  No rash  Neuro:  grossly intact     LABS:                          13.9   17.41 )-----------( 303      ( 27 Feb 2018 07:46 )             41.4   02-27    134<L>  |  96  |  21  ----------------------------<  186<H>  5.0   |  18<L>  |  0.47<L>    Ca    9.1      27 Feb 2018 09:27  Phos  2.6     02-27  Mg     2.0     02-27    TPro  5.8<L>  /  Alb  2.3<L>  /  TBili  4.5<H>  /  DBili  3.0<H>  /  AST  348<H>  /  ALT  309<H>  /  AlkPhos  577<H>  02-27

## 2018-02-28 LAB
ALBUMIN SERPL ELPH-MCNC: 2.3 G/DL — LOW (ref 3.3–5)
ALP SERPL-CCNC: 557 U/L — HIGH (ref 40–120)
ALT FLD-CCNC: 350 U/L RC — HIGH (ref 10–45)
ANION GAP SERPL CALC-SCNC: 19 MMOL/L — HIGH (ref 5–17)
AST SERPL-CCNC: 469 U/L — HIGH (ref 10–40)
BASOPHILS # BLD AUTO: 0.01 K/UL — SIGNIFICANT CHANGE UP (ref 0–0.2)
BASOPHILS NFR BLD AUTO: 0.1 % — SIGNIFICANT CHANGE UP (ref 0–2)
BILIRUB DIRECT SERPL-MCNC: 4.2 MG/DL — HIGH (ref 0–0.2)
BILIRUB INDIRECT FLD-MCNC: 1.2 MG/DL — HIGH (ref 0.2–1)
BILIRUB SERPL-MCNC: 5.4 MG/DL — HIGH (ref 0.2–1.2)
BILIRUB SERPL-MCNC: 5.4 MG/DL — HIGH (ref 0.2–1.2)
BUN SERPL-MCNC: 20 MG/DL — SIGNIFICANT CHANGE UP (ref 7–23)
CALCIUM SERPL-MCNC: 8.8 MG/DL — SIGNIFICANT CHANGE UP (ref 8.4–10.5)
CHLORIDE SERPL-SCNC: 95 MMOL/L — LOW (ref 96–108)
CO2 SERPL-SCNC: 21 MMOL/L — LOW (ref 22–31)
CREAT SERPL-MCNC: 0.57 MG/DL — SIGNIFICANT CHANGE UP (ref 0.5–1.3)
EOSINOPHIL # BLD AUTO: 0.01 K/UL — SIGNIFICANT CHANGE UP (ref 0–0.5)
EOSINOPHIL NFR BLD AUTO: 0.1 % — SIGNIFICANT CHANGE UP (ref 0–6)
GLUCOSE SERPL-MCNC: 176 MG/DL — HIGH (ref 70–99)
HCT VFR BLD CALC: 42.7 % — SIGNIFICANT CHANGE UP (ref 39–50)
HGB BLD-MCNC: 14 G/DL — SIGNIFICANT CHANGE UP (ref 13–17)
IMM GRANULOCYTES NFR BLD AUTO: 0.8 % — SIGNIFICANT CHANGE UP (ref 0–1.5)
LYMPHOCYTES # BLD AUTO: 0.81 K/UL — LOW (ref 1–3.3)
LYMPHOCYTES # BLD AUTO: 4.8 % — LOW (ref 13–44)
MAGNESIUM SERPL-MCNC: 2 MG/DL — SIGNIFICANT CHANGE UP (ref 1.6–2.6)
MCHC RBC-ENTMCNC: 27.2 PG — SIGNIFICANT CHANGE UP (ref 27–34)
MCHC RBC-ENTMCNC: 32.8 GM/DL — SIGNIFICANT CHANGE UP (ref 32–36)
MCV RBC AUTO: 83.1 FL — SIGNIFICANT CHANGE UP (ref 80–100)
MONOCYTES # BLD AUTO: 1.14 K/UL — HIGH (ref 0–0.9)
MONOCYTES NFR BLD AUTO: 6.7 % — SIGNIFICANT CHANGE UP (ref 2–14)
NEUTROPHILS # BLD AUTO: 14.92 K/UL — HIGH (ref 1.8–7.4)
NEUTROPHILS NFR BLD AUTO: 87.5 % — HIGH (ref 43–77)
PHOSPHATE SERPL-MCNC: 2.4 MG/DL — LOW (ref 2.5–4.5)
PLATELET # BLD AUTO: 332 K/UL — SIGNIFICANT CHANGE UP (ref 150–400)
POTASSIUM SERPL-MCNC: 4.6 MMOL/L — SIGNIFICANT CHANGE UP (ref 3.5–5.3)
POTASSIUM SERPL-SCNC: 4.6 MMOL/L — SIGNIFICANT CHANGE UP (ref 3.5–5.3)
PROT SERPL-MCNC: 5.6 G/DL — LOW (ref 6–8.3)
RBC # BLD: 5.14 M/UL — SIGNIFICANT CHANGE UP (ref 4.2–5.8)
RBC # FLD: 18.2 % — HIGH (ref 10.3–14.5)
SODIUM SERPL-SCNC: 135 MMOL/L — SIGNIFICANT CHANGE UP (ref 135–145)
WBC # BLD: 17.02 K/UL — HIGH (ref 3.8–10.5)
WBC # FLD AUTO: 17.02 K/UL — HIGH (ref 3.8–10.5)

## 2018-02-28 PROCEDURE — 99233 SBSQ HOSP IP/OBS HIGH 50: CPT | Mod: GC

## 2018-02-28 PROCEDURE — 99233 SBSQ HOSP IP/OBS HIGH 50: CPT

## 2018-02-28 RX ADMIN — Medication 2: at 12:50

## 2018-02-28 RX ADMIN — Medication 100 MILLIGRAM(S): at 13:09

## 2018-02-28 RX ADMIN — Medication 100 MILLIGRAM(S): at 22:59

## 2018-02-28 RX ADMIN — Medication 1: at 18:29

## 2018-02-28 RX ADMIN — HYDROMORPHONE HYDROCHLORIDE 0.5 MILLIGRAM(S): 2 INJECTION INTRAMUSCULAR; INTRAVENOUS; SUBCUTANEOUS at 23:28

## 2018-02-28 RX ADMIN — HYDROMORPHONE HYDROCHLORIDE 0.5 MILLIGRAM(S): 2 INJECTION INTRAMUSCULAR; INTRAVENOUS; SUBCUTANEOUS at 14:07

## 2018-02-28 RX ADMIN — HYDROMORPHONE HYDROCHLORIDE 0.5 MILLIGRAM(S): 2 INJECTION INTRAMUSCULAR; INTRAVENOUS; SUBCUTANEOUS at 18:21

## 2018-02-28 RX ADMIN — HYDROMORPHONE HYDROCHLORIDE 0.5 MILLIGRAM(S): 2 INJECTION INTRAMUSCULAR; INTRAVENOUS; SUBCUTANEOUS at 13:37

## 2018-02-28 RX ADMIN — HYDROMORPHONE HYDROCHLORIDE 0.5 MILLIGRAM(S): 2 INJECTION INTRAMUSCULAR; INTRAVENOUS; SUBCUTANEOUS at 10:06

## 2018-02-28 RX ADMIN — ONDANSETRON 4 MILLIGRAM(S): 8 TABLET, FILM COATED ORAL at 23:10

## 2018-02-28 RX ADMIN — Medication 0.5 MILLIGRAM(S): at 17:37

## 2018-02-28 RX ADMIN — HYDROMORPHONE HYDROCHLORIDE 0.5 MILLIGRAM(S): 2 INJECTION INTRAMUSCULAR; INTRAVENOUS; SUBCUTANEOUS at 23:08

## 2018-02-28 RX ADMIN — SODIUM CHLORIDE 50 MILLILITER(S): 9 INJECTION, SOLUTION INTRAVENOUS at 09:08

## 2018-02-28 RX ADMIN — HYDROMORPHONE HYDROCHLORIDE 1 MILLIGRAM(S): 2 INJECTION INTRAMUSCULAR; INTRAVENOUS; SUBCUTANEOUS at 03:47

## 2018-02-28 RX ADMIN — Medication 4 MILLIGRAM(S): at 05:38

## 2018-02-28 RX ADMIN — Medication 4 MILLIGRAM(S): at 18:32

## 2018-02-28 RX ADMIN — HYDROMORPHONE HYDROCHLORIDE 0.5 MILLIGRAM(S): 2 INJECTION INTRAMUSCULAR; INTRAVENOUS; SUBCUTANEOUS at 09:36

## 2018-02-28 RX ADMIN — Medication 1: at 08:28

## 2018-02-28 RX ADMIN — HYDROMORPHONE HYDROCHLORIDE 1 MILLIGRAM(S): 2 INJECTION INTRAMUSCULAR; INTRAVENOUS; SUBCUTANEOUS at 04:07

## 2018-02-28 RX ADMIN — Medication 100 MILLIGRAM(S): at 05:38

## 2018-02-28 RX ADMIN — PANTOPRAZOLE SODIUM 40 MILLIGRAM(S): 20 TABLET, DELAYED RELEASE ORAL at 08:29

## 2018-02-28 RX ADMIN — SENNA PLUS 2 TABLET(S): 8.6 TABLET ORAL at 22:59

## 2018-02-28 RX ADMIN — Medication 1 TABLET(S): at 11:24

## 2018-02-28 RX ADMIN — Medication 62.5 MILLIMOLE(S): at 09:03

## 2018-02-28 NOTE — PROGRESS NOTE ADULT - SUBJECTIVE AND OBJECTIVE BOX
INTERVAL HPI/OVERNIGHT EVENTS:  reconsulted for goals of care, pt seen sitting up in chair with daughter by his side.   C/O not knowing what is happening.      Allergies    No Known Allergies    Intolerances      ADVANCE DIRECTIVES:    DNR   MOLST [ ] YES [ x] NO     MEDICATIONS  (STANDING):  dexamethasone  Injectable 4 milliGRAM(s) IV Push two times a day  dextrose 5% + sodium chloride 0.9%. 1000 milliLiter(s) (50 mL/Hr) IV Continuous <Continuous>  dextrose 5%. 1000 milliLiter(s) (50 mL/Hr) IV Continuous <Continuous>  dextrose 50% Injectable 12.5 Gram(s) IV Push once  dextrose 50% Injectable 25 Gram(s) IV Push once  dextrose 50% Injectable 25 Gram(s) IV Push once  docusate sodium 100 milliGRAM(s) Oral three times a day  insulin lispro (HumaLOG) corrective regimen sliding scale   SubCutaneous three times a day before meals  insulin lispro (HumaLOG) corrective regimen sliding scale   SubCutaneous at bedtime  multivitamin 1 Tablet(s) Oral daily  pantoprazole    Tablet 40 milliGRAM(s) Oral before breakfast  senna 2 Tablet(s) Oral at bedtime    MEDICATIONS  (PRN):  bisacodyl Suppository 10 milliGRAM(s) Rectal daily PRN Constipation  dextrose Gel 1 Dose(s) Oral once PRN Blood Glucose LESS THAN 70 milliGRAM(s)/deciliter  glucagon  Injectable 1 milliGRAM(s) IntraMuscular once PRN Glucose LESS THAN 70 milligrams/deciliter  HYDROmorphone  Injectable 0.5 milliGRAM(s) IV Push every 4 hours PRN Moderate Pain (4 - 6)  HYDROmorphone  Injectable 1 milliGRAM(s) IV Push every 4 hours PRN Severe Pain (7 - 10)  ondansetron Injectable 4 milliGRAM(s) IV Push every 6 hours PRN Nausea and/or Vomiting      PRESENT SYMPTOMS:  Source: [x ] Patient   [ ] Family   [ ] Team     Pain:                        [ x] No [ ] Yes             [ ] Mild [ ] Moderate [ ] Severe    Onset -  Location -  Duration -  Character -  Alleviating/Aggravating -  Radiation -  Timing -    Dyspnea:                [ x] No [ ] Yes             [ ] Mild [ ] Moderate [ ] Severe    Anxiety:                  [ ] No [x ] Yes             [ ] Mild [ x] Moderate [ ] Severe    Fatigue:                  [ ] No [x ] Yes             [ ] Mild [ x] Moderate [ ] Severe    Nausea:                  [x ] No [ ] Yes             [ ] Mild [ ] Moderate [ ] Severe    Loss of appetite:   [ ] No [ x] Yes             [ ] Mild [x ] Moderate [ ] Severe    Constipation:        x[ ] No [ ] Yes             [ ] Mild [ ] Moderate [ ] Severe    Other Symptoms:  [ x] All other review of systems negative   [ ] Unable to obtain due to poor mentation     Karnofsky Performance Score/Palliative Performance Status Version 2:      30   %    PHYSICAL EXAM:  Vital Signs Last 24 Hrs  T(C): 36.3 (28 Feb 2018 06:27), Max: 36.9 (27 Feb 2018 22:17)  T(F): 97.4 (28 Feb 2018 06:27), Max: 98.4 (27 Feb 2018 22:17)  HR: 93 (28 Feb 2018 09:35) (90 - 94)  BP: 109/74 (28 Feb 2018 09:35) (109/74 - 135/89)  BP(mean): --  RR: 18 (28 Feb 2018 09:35) (18 - 18)  SpO2: 96% (28 Feb 2018 09:35) (93% - 96%) I&O's Summary    27 Feb 2018 07:01  -  28 Feb 2018 07:00  --------------------------------------------------------  IN: 1400 mL / OUT: 1150 mL / NET: 250 mL    28 Feb 2018 07:01  -  28 Feb 2018 13:12  --------------------------------------------------------  IN: 120 mL / OUT: 300 mL / NET: -180 mL        General:  [x ] Alert  [x ] Oriented x 4     [ ] Lethargic  [ ] Agitated   [x ] Cachexia   [ ] Unarousable  [x ] Verbal  [ ] Non-Verbal    HEENT:  [ ] Normal   [x ] Dry mouth   [ ] ET Tube    [ ] Trach  [ ] Oral lesions    Lungs:   x[ ] Clear [ ] Tachypnea  [ ] Audible excessive secretions   [ ] Rhonchi        [ ] Right [ ] Left [ ] Bilateral  [ ] Crackles        [ ] Right [ ] Left [ ] Bilateral  [ ] Wheezing     [ ] Right [ ] Left [ ] Bilateral    Cardiovascular:  [ ] Regular [ ] Irregular [x ] Tachycardia   [ ] Bradycardia  [ ] Murmur [ ] Other    Abdomen: [ ] Soft  [ x] Distended   [ ] +BS  [ ] Non tender [x ] Tender  [ ]PEG   [ ]OGT/ NGT   Last BM:       Genitourinary: [ x] Normal [ ] Incontinent   [ ] Oliguria/Anuria   [ ] Tovar    Musculoskeletal:  [ ] Normal   [ x] Weakness  [ ] Bedbound/Wheelchair bound [ ] Edema    Neurological: x[ ] No focal deficits  [ ] Cognitive impairment  [ ] Dysphagia [ ] Dysarthria [ ] Paresis [ ] Other     Skin: [ x] Normal   [ ] Pressure ulcer(s)                  [ ] Rash    LABS:                        14.0   17.02 )-----------( 332      ( 28 Feb 2018 09:16 )             42.7     02-28    135  |  95<L>  |  20  ----------------------------<  176<H>  4.6   |  21<L>  |  0.57    Ca    8.8      28 Feb 2018 07:26  Phos  2.4     02-28  Mg     2.0     02-28    TPro  x   /  Alb  x   /  TBili  5.4<H>  /  DBili  x   /  AST  x   /  ALT  x   /  AlkPhos  x   02-28          Shock: [ ] Septic [ ] Cardiogenic [ ] Neurologic [ ] Hypovolemic  Vasopressors x   Inotrophs x     Oral Intake: [ ] Unable/mouth care only [ ] Minimal [ ] Moderate [ ] Full Capability    Diet: [ ] NPO [ ] Tube feeds [ ] TPN [ ] Other     RADIOLOGY & ADDITIONAL STUDIES:    < from: CT Abdomen and Pelvis w/ IV Cont (02.19.18 @ 21:29) >  MPRESSION: No significant interval change since 2/6/2018. Distal   esophageal mass, diffuse pulmonary, mediastinal, hilar and hepatic   metastatic disease, not significantly changed since 2/6/2018. Scattered   subcutaneous nodules in the right chest and ventral abdominal wall and   left adrenal nodule, suspicious for metastases.    < end of copied text >        REFERRALS:   [ ] Chaplaincy  [ ] Hospice  [ ] Child Life  [ ] Social Work  [ ] Case management [ ] Holistic Therapy

## 2018-02-28 NOTE — PROGRESS NOTE ADULT - PROBLEM SELECTOR PLAN 4
Palliative team reconsulted for goals of care discussion as no disease modifying treatments are being offered at this time.  Meeting established for tomorrow with wife and two children at 11 am.

## 2018-02-28 NOTE — PROGRESS NOTE ADULT - SUBJECTIVE AND OBJECTIVE BOX
Chief Complaint: esophageal ca    INTERVAL HPI/OVERNIGHT EVENTS: Pt s/p stent placement on 2/22. Persistent nausea and pain. Struggling with news about the rapid progression of cancer and our inability to treat it    Vital Signs Last 24 Hrs  T(C): 36.9 (28 Feb 2018 15:12), Max: 36.9 (27 Feb 2018 22:17)  T(F): 98.4 (28 Feb 2018 15:12), Max: 98.4 (27 Feb 2018 22:17)  HR: 89 (28 Feb 2018 15:12) (89 - 94)  BP: 118/77 (28 Feb 2018 15:12) (109/74 - 139/70)  BP(mean): --  RR: 18 (28 Feb 2018 15:12) (18 - 18)  SpO2: 98% (28 Feb 2018 15:12) (93% - 98%)    Physical Exam:  Gen:  awake and alert  HEENT:  MMM O/P clear, temporal wasting  Neck:  Supple, No LAD   Lungs:  clear  CVS:  Normal S1 S2   Abd:  +BS, soft, mild tenderness in RUQ to palpation  Ext:  Trace LE edema  Skin:  No rash  Neuro:  grossly intact     LABS:                          14.0   17.02 )-----------( 332      ( 28 Feb 2018 09:16 )             42.7   02-28    135  |  95<L>  |  20  ----------------------------<  176<H>  4.6   |  21<L>  |  0.57    Ca    8.8      28 Feb 2018 07:26  Phos  2.4     02-28  Mg     2.0     02-28    TPro  x   /  Alb  x   /  TBili  5.4<H>  /  DBili  x   /  AST  x   /  ALT  x   /  AlkPhos  x   02-28

## 2018-02-28 NOTE — PROGRESS NOTE ADULT - PROBLEM SELECTOR PLAN 5
- possible risk for steroid-induced hyperglycemia given patient on Decadron for esophageal carcinoma  -c/w ISS

## 2018-02-28 NOTE — PROGRESS NOTE ADULT - PROBLEM SELECTOR PLAN 2
- In setting of malignancy w/ metastatic disease.    - Nutrition recs appreciated, low calorie count. C/w pureed diet as tolerated.   - monitor BMP daily

## 2018-02-28 NOTE — PROGRESS NOTE ADULT - PROBLEM SELECTOR PLAN 1
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

## 2018-02-28 NOTE — PROGRESS NOTE ADULT - ASSESSMENT
62 year old man with history of Type II DM, WPW (s/p ablation 2016), TIA, and recent diagnosis of poorly differentiated esophageal adenocarcinoma with mets to liver, lung, and brain admitted on 2/20 for severe dehydration and nausea s/p esophageal stent, reconsulted for goals of care

## 2018-02-28 NOTE — PROGRESS NOTE ADULT - ATTENDING COMMENTS
Palliative chemo no longer an option due to rising bili and LFTs. No obstruction seen on RUQ u/s, likely progressive metastatic malignancies in liver.   Patient's wife met with oncology yesterday, poor prognosis discussed with family.   Palliative to see patient today.  Continue pain control and symptom management. Patient toelrated a few bites today, no further vomiting last night or this morning.

## 2018-02-28 NOTE — PROGRESS NOTE ADULT - PROBLEM SELECTOR PLAN 4
- likely secondary to hepatic metastases  - patient with uptrending enzymes, and elevated bilirubin likely due to progression of disease.   - Abdominal ultrasound, no duct dilation.

## 2018-02-28 NOTE — PROGRESS NOTE ADULT - SUBJECTIVE AND OBJECTIVE BOX
Patient is a 62y old  Male who presents with a chief complaint of Weakness, N/V, (22 Feb 2018 17:59)    SUBJECTIVE / OVERNIGHT EVENTS:    MEDICATIONS  (STANDING):  dexamethasone  Injectable 4 milliGRAM(s) IV Push two times a day  dextrose 5% + sodium chloride 0.9%. 1000 milliLiter(s) (50 mL/Hr) IV Continuous <Continuous>  dextrose 5%. 1000 milliLiter(s) (50 mL/Hr) IV Continuous <Continuous>  dextrose 50% Injectable 12.5 Gram(s) IV Push once  dextrose 50% Injectable 25 Gram(s) IV Push once  dextrose 50% Injectable 25 Gram(s) IV Push once  docusate sodium 100 milliGRAM(s) Oral three times a day  insulin lispro (HumaLOG) corrective regimen sliding scale   SubCutaneous three times a day before meals  insulin lispro (HumaLOG) corrective regimen sliding scale   SubCutaneous at bedtime  multivitamin 1 Tablet(s) Oral daily  pantoprazole    Tablet 40 milliGRAM(s) Oral before breakfast  senna 2 Tablet(s) Oral at bedtime    MEDICATIONS  (PRN):  bisacodyl Suppository 10 milliGRAM(s) Rectal daily PRN Constipation  dextrose Gel 1 Dose(s) Oral once PRN Blood Glucose LESS THAN 70 milliGRAM(s)/deciliter  glucagon  Injectable 1 milliGRAM(s) IntraMuscular once PRN Glucose LESS THAN 70 milligrams/deciliter  HYDROmorphone  Injectable 0.5 milliGRAM(s) IV Push every 4 hours PRN Moderate Pain (4 - 6)  HYDROmorphone  Injectable 1 milliGRAM(s) IV Push every 4 hours PRN Severe Pain (7 - 10)  ondansetron Injectable 4 milliGRAM(s) IV Push every 6 hours PRN Nausea and/or Vomiting      Vital Signs Last 24 Hrs  T(C): 36.3 (28 Feb 2018 06:27), Max: 36.9 (27 Feb 2018 22:17)  T(F): 97.4 (28 Feb 2018 06:27), Max: 98.4 (27 Feb 2018 22:17)  HR: 90 (28 Feb 2018 06:27) (90 - 94)  BP: 128/83 (28 Feb 2018 06:27) (115/76 - 135/89)  BP(mean): --  RR: 18 (28 Feb 2018 06:27) (18 - 18)  SpO2: 93% (28 Feb 2018 06:27) (93% - 96%)  CAPILLARY BLOOD GLUCOSE      POCT Blood Glucose.: 185 mg/dL (28 Feb 2018 08:23)  POCT Blood Glucose.: 162 mg/dL (27 Feb 2018 22:05)  POCT Blood Glucose.: 169 mg/dL (27 Feb 2018 15:48)  POCT Blood Glucose.: 177 mg/dL (27 Feb 2018 14:04)  POCT Blood Glucose.: 186 mg/dL (27 Feb 2018 10:28)    I&O's Summary    27 Feb 2018 07:01  -  28 Feb 2018 07:00  --------------------------------------------------------  IN: 1400 mL / OUT: 1150 mL / NET: 250 mL        PHYSICAL EXAM:  GENERAL: NAD, well-developed  HEAD:  Atraumatic, Normocephalic  EYES: EOMI, PERRLA, conjunctiva and sclera clear  NECK: Supple, No JVD  CHEST/LUNG: Clear to auscultation bilaterally; No wheeze  HEART: Regular rate and rhythm; Normal S1 S2, No murmurs, rubs, or gallops  ABDOMEN: Soft, Nontender, Nondistended; Bowel sounds present  EXTREMITIES:  2+ Peripheral Pulses, No clubbing, cyanosis, or edema  PSYCH: AAOx3  NEUROLOGY: non-focal  SKIN: No rashes or lesions    LABS:                        13.9   17.41 )-----------( 303      ( 27 Feb 2018 07:46 )             41.4     02-28    135  |  95<L>  |  20  ----------------------------<  176<H>  4.6   |  21<L>  |  x     Ca    8.8      28 Feb 2018 07:26  Phos  2.4     02-28  Mg     2.0     02-28    TPro  x   /  Alb  x   /  TBili  5.4<H>  /  DBili  x   /  AST  x   /  ALT  x   /  AlkPhos  x   02-28              RADIOLOGY & ADDITIONAL TESTS:    Imaging Personally Reviewed:    Consultant(s) Notes Reviewed:      Care Discussed with Consultants/Other Providers: Avinash Yousif  PGY-1 Resident Physician   Pager [phone] or [phone] from 7am to 7pm, after 7pm page night float     Patient is a 62y old  Male who presents with a chief complaint of Weakness, N/V, (22 Feb 2018 17:59)    SUBJECTIVE / OVERNIGHT EVENTS:  Patient with episodes of nausea/vomiting last night, which improved with reglan/zofran. Tolerated diet around 2am. Patient's pain well controlled with medications. Patient without bowel movements since friday. Patient denied fever, cough, sob, palpitations, chest pain, focal weakness, blurry vision.     MEDICATIONS  (STANDING):  dexamethasone  Injectable 4 milliGRAM(s) IV Push two times a day  dextrose 5% + sodium chloride 0.9%. 1000 milliLiter(s) (50 mL/Hr) IV Continuous <Continuous>  dextrose 5%. 1000 milliLiter(s) (50 mL/Hr) IV Continuous <Continuous>  dextrose 50% Injectable 12.5 Gram(s) IV Push once  dextrose 50% Injectable 25 Gram(s) IV Push once  dextrose 50% Injectable 25 Gram(s) IV Push once  docusate sodium 100 milliGRAM(s) Oral three times a day  insulin lispro (HumaLOG) corrective regimen sliding scale   SubCutaneous three times a day before meals  insulin lispro (HumaLOG) corrective regimen sliding scale   SubCutaneous at bedtime  multivitamin 1 Tablet(s) Oral daily  pantoprazole    Tablet 40 milliGRAM(s) Oral before breakfast  senna 2 Tablet(s) Oral at bedtime    MEDICATIONS  (PRN):  bisacodyl Suppository 10 milliGRAM(s) Rectal daily PRN Constipation  dextrose Gel 1 Dose(s) Oral once PRN Blood Glucose LESS THAN 70 milliGRAM(s)/deciliter  glucagon  Injectable 1 milliGRAM(s) IntraMuscular once PRN Glucose LESS THAN 70 milligrams/deciliter  HYDROmorphone  Injectable 0.5 milliGRAM(s) IV Push every 4 hours PRN Moderate Pain (4 - 6)  HYDROmorphone  Injectable 1 milliGRAM(s) IV Push every 4 hours PRN Severe Pain (7 - 10)  ondansetron Injectable 4 milliGRAM(s) IV Push every 6 hours PRN Nausea and/or Vomiting      Vital Signs Last 24 Hrs  T(C): 36.3 (28 Feb 2018 06:27), Max: 36.9 (27 Feb 2018 22:17)  T(F): 97.4 (28 Feb 2018 06:27), Max: 98.4 (27 Feb 2018 22:17)  HR: 90 (28 Feb 2018 06:27) (90 - 94)  BP: 128/83 (28 Feb 2018 06:27) (115/76 - 135/89)  BP(mean): --  RR: 18 (28 Feb 2018 06:27) (18 - 18)  SpO2: 93% (28 Feb 2018 06:27) (93% - 96%)  CAPILLARY BLOOD GLUCOSE      POCT Blood Glucose.: 185 mg/dL (28 Feb 2018 08:23)  POCT Blood Glucose.: 162 mg/dL (27 Feb 2018 22:05)  POCT Blood Glucose.: 169 mg/dL (27 Feb 2018 15:48)  POCT Blood Glucose.: 177 mg/dL (27 Feb 2018 14:04)  POCT Blood Glucose.: 186 mg/dL (27 Feb 2018 10:28)    I&O's Summary    27 Feb 2018 07:01  -  28 Feb 2018 07:00  --------------------------------------------------------  IN: 1400 mL / OUT: 1150 mL / NET: 250 mL    PHYSICAL EXAM:  GENERAL: NAD, well-developed  HEAD:  Atraumatic, Normocephalic  EYES: EOMI, PERRLA, icteric eyes   NECK: Supple, No JVD  CHEST/LUNG: Decreased breath sounds to the right side other wise clear to auscultation; No wheeze  HEART: Regular rate and rhythm; Normal S1 S2, No murmurs, rubs, or gallops  ABDOMEN: Soft, tenderness to epigastric area, Nondistended; Bowel sounds decreaed   EXTREMITIES:  2+ Peripheral Pulses, No clubbing, cyanosis, or edema  PSYCH: AAOx3  NEUROLOGY: non-focal  SKIN: No rashes or lesions    LABS:                        13.9   17.41 )-----------( 303      ( 27 Feb 2018 07:46 )             41.4     02-28    135  |  95<L>  |  20  ----------------------------<  176<H>  4.6   |  21<L>  |  x     Ca    8.8      28 Feb 2018 07:26  Phos  2.4     02-28  Mg     2.0     02-28    TPro  x   /  Alb  x   /  TBili  5.4<H>  /  DBili  x   /  AST  x   /  ALT  x   /  AlkPhos  x   02-28               14.0   17.02 )-----------( 332      ( 28 Feb 2018 09:16 )             42.7     Hgb Trend: 14.0<--, 13.9<--, 14.0<--, 15.0<--, 13.4<--  02-28    135  |  95<L>  |  20  ----------------------------<  176<H>  4.6   |  21<L>  |  0.57    Ca    8.8      28 Feb 2018 07:26  Phos  2.4     02-28  Mg     2.0     02-28    TPro  x   /  Alb  x   /  TBili  5.4<H>  /  DBili  x   /  AST  x   /  ALT  x   /  AlkPhos  x   02-28    Creatinine Trend: 0.57<--, 0.47<--, 0.54<--, 0.72<--, 0.61<--, 0.76<--      RADIOLOGY & ADDITIONAL TESTS:    Imaging Personally Reviewed:    Consultant(s) Notes Reviewed:  Oncology, Nutrition services     Care Discussed with Consultants/Other Providers: Palliative care

## 2018-02-28 NOTE — PROGRESS NOTE ADULT - PROBLEM SELECTOR PLAN 3
Current management appropriate/  Dilaudid .5 mg q 4 hours IVP mild mod pain;  1 mg dilaudid q 4 hours ivp mod severe pain.

## 2018-02-28 NOTE — PROGRESS NOTE ADULT - ASSESSMENT
62 M with TIA (1/19 w/ no focal deficits), WPW s/p ablation (7/2016) with loop recorded, DM2, BPH and new poorly differentiated esophageal adenocarcinoma with extensive liver, pulm, brain metastasis presents for decreased PO tolerance, found with metabolic acidosis likely starvation ketosis now s/p esophageal stent placement. Patient with worsening hepatic function, oncology recs no chemo.

## 2018-02-28 NOTE — CHART NOTE - NSCHARTNOTEFT_GEN_A_CORE
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 Pt is S/P S/P EGD with stent placement 2/22.    Pt seen for completion of calorie count. Pt reports poor appetite and intake continue, only able to have a few spoonfuls of applesauce this morning. No GI distress at this time. Requests flavor change of Glucerna to strawberry.   Patient reports [ ] nausea  [ ] vomiting [ ] diarrhea [ ] constipation  [ ]chewing problems [ ] swallowing issues  [ ] other:      PO intake:  < 50% [ ] 50-75% [ ]   % [ ]  other :     Source for PO intake [ ] Patient [ ] family [ ] chart [ ] staff [ ] other     Enteral /Parenteral Nutrition:       Current Weight:   % Weight Change    Pertinent Medications: MEDICATIONS  (STANDING):  dexamethasone  Injectable 4 milliGRAM(s) IV Push two times a day  dextrose 5% + sodium chloride 0.9%. 1000 milliLiter(s) (50 mL/Hr) IV Continuous <Continuous>  dextrose 5%. 1000 milliLiter(s) (50 mL/Hr) IV Continuous <Continuous>  dextrose 50% Injectable 12.5 Gram(s) IV Push once  dextrose 50% Injectable 25 Gram(s) IV Push once  dextrose 50% Injectable 25 Gram(s) IV Push once  docusate sodium 100 milliGRAM(s) Oral three times a day  insulin lispro (HumaLOG) corrective regimen sliding scale   SubCutaneous three times a day before meals  insulin lispro (HumaLOG) corrective regimen sliding scale   SubCutaneous at bedtime  multivitamin 1 Tablet(s) Oral daily  pantoprazole    Tablet 40 milliGRAM(s) Oral before breakfast  senna 2 Tablet(s) Oral at bedtime    MEDICATIONS  (PRN):  bisacodyl Suppository 10 milliGRAM(s) Rectal daily PRN Constipation  dextrose Gel 1 Dose(s) Oral once PRN Blood Glucose LESS THAN 70 milliGRAM(s)/deciliter  glucagon  Injectable 1 milliGRAM(s) IntraMuscular once PRN Glucose LESS THAN 70 milligrams/deciliter  HYDROmorphone  Injectable 0.5 milliGRAM(s) IV Push every 4 hours PRN Moderate Pain (4 - 6)  HYDROmorphone  Injectable 1 milliGRAM(s) IV Push every 4 hours PRN Severe Pain (7 - 10)  ondansetron Injectable 4 milliGRAM(s) IV Push every 6 hours PRN Nausea and/or Vomiting    Pertinent Labs:  02-28 Na135 mmol/L Glu 176 mg/dL<H> K+ 4.6 mmol/L Cr  0.57 mg/dL BUN 20 mg/dL Phos 2.4 mg/dL<L> Alb 2.3 g/dL<L> PAB n/a         Skin:     Estimated Needs:   [ ] no change since previous assessment  [ ] recalculated:       Previous Nutrition Diagnosis:     [ ] Inadequate Energy Intake [ ]Inadequate Oral Intake [ ] Excessive Energy Intake     [ ] Underweight [ ] Increased Nutrient Needs [ ] Overweight/Obesity     [ ] Altered GI Function [ ] Unintended Weight Loss [ ] Food & Nutrition Related Knowledge Deficit [ ] Malnutrition          Nutrition Diagnosis is [ ] ongoing  [ ] resolved [ ] not applicable          New Nutrition Diagnosis: [ ] not applicable    [ ] Inadequate Protein Energy Intake [ ]Inadequate Oral Intake [ ] Excessive Energy Intake     [ ] Underweight [ ] Increased Nutrient Needs [ ] Overweight/Obesity     [ ] Altered GI Function [ ] Unintended Weight Loss [ ] Food & Nutrition Related Knowledge Deficit[ ] Limited Adherence to nutrition related recommendations [ ] Malnutrition  [ ] other: Free text       Related to:      As evidenced by:      Interventions:     Recommend    [ ] Change Diet To:    [ ] Nutrition Supplement    [ ] Nutrition Support    [ ] Other:        Monitoring and Evaluation:     [ ] PO intake [ ] Tolerance to diet prescription [ ] weights [ ] follow up per protocol    [ ] other: Source: Patient [ x]    Family [ ]     other [x ] medical record    Diet : Pureed, Consistent CHO, 2 Nocarb Prosource (additional 120kcal, 30gm protein), Glucerna, 3 per day (provides additional 660cal, 30gm protein)     Hospital course: Per chart, pt newly diagnosed 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 Pt is S/P EGD with stent placement 2/22.    Pt seen for completion of calorie count. Pt reports poor appetite and po intake continue, only able to have a few spoonfuls of applesauce this morning. No GI distress at this time, last BM 4 days ago- likely 2/2 minimal po intake. Requests flavor change of Glucerna to strawberry.   Results from 3 day calorie count as follows:  Day #1 550 kcal, 25gm protein including pt's self reported 1/2 Glucerna can  Day #2 670kcal, 40gm protein   Day #3 Pt was NPO for breakfast and lunch, dinner only able to handle 2 spoons of pastina ~20kcal     Estimated nutrient needs using current body weight 53.5Kg  Energy (30-35kcal/kg 53.5Kg):1605-1872kcal/day  Protein (1/2-1/4gm protein/kg 53.5Kg): 64-75 gm protein/day    Based on trend of three day calorie count, pt is consuming <50% of estimated nutrient needs (average of 600kcal, 30 gm protein-11kcal/kg, .6gm protein/Kg) Results discussed with team, per medical team plan is to reconsult Palliative care, GOC to be determined.    PO intake:  < 50% [x ] 50-75% [ ]   % [ ]  other :     Source for PO intake [x ] Patient [ ] family [ ] chart [ ] staff [ ] other    Current Weight: No new weight to assess    Pertinent Medications: MEDICATIONS  (STANDING):  dexamethasone  Injectable 4 milliGRAM(s) IV Push two times a day  dextrose 5% + sodium chloride 0.9%. 1000 milliLiter(s) (50 mL/Hr) IV Continuous <Continuous>  dextrose 5%. 1000 milliLiter(s) (50 mL/Hr) IV Continuous <Continuous>  dextrose 50% Injectable 12.5 Gram(s) IV Push once  dextrose 50% Injectable 25 Gram(s) IV Push once  dextrose 50% Injectable 25 Gram(s) IV Push once  docusate sodium 100 milliGRAM(s) Oral three times a day  insulin lispro (HumaLOG) corrective regimen sliding scale   SubCutaneous three times a day before meals  insulin lispro (HumaLOG) corrective regimen sliding scale   SubCutaneous at bedtime  multivitamin 1 Tablet(s) Oral daily  pantoprazole    Tablet 40 milliGRAM(s) Oral before breakfast  senna 2 Tablet(s) Oral at bedtime    MEDICATIONS  (PRN):  bisacodyl Suppository 10 milliGRAM(s) Rectal daily PRN Constipation  dextrose Gel 1 Dose(s) Oral once PRN Blood Glucose LESS THAN 70 milliGRAM(s)/deciliter  glucagon  Injectable 1 milliGRAM(s) IntraMuscular once PRN Glucose LESS THAN 70 milligrams/deciliter  HYDROmorphone  Injectable 0.5 milliGRAM(s) IV Push every 4 hours PRN Moderate Pain (4 - 6)  HYDROmorphone  Injectable 1 milliGRAM(s) IV Push every 4 hours PRN Severe Pain (7 - 10)  ondansetron Injectable 4 milliGRAM(s) IV Push every 6 hours PRN Nausea and/or Vomiting    Pertinent Labs:  02-28 Na135 mmol/L Glu 176 mg/dL<H> K+ 4.6 mmol/L Cr  0.57 mg/dL BUN 20 mg/dL Phos 2.4 mg/dL<L> Alb 2.3 g/dL<L>     Skin: +1 bilateral ankle edema, no pressure ulcers     Estimated Needs:   [x ] no change since previous assessment  [ ] recalculated:       Previous Nutrition Diagnosis:    [x ] Malnutrition, severe         Nutrition Diagnosis is [x ] ongoing  [ ] resolved [ ] not applicable          New Nutrition Diagnosis: [ x] not applicable    Recommend    [ ] Change Diet To:    [X ] Nutrition Supplement: Continue Glucerna, 3 per day (provides additional 660cal, 30gm protein) and 2 Nocarb Prosource (additional 120kcal, 30gm protein)      [ ] Nutrition Support    [X ] Other:  Continue to encourage po intake, small frequent meals, and protein/energy-dense food        Monitoring and Evaluation:     [x ] PO intake [x ] Tolerance to diet prescription [x ] weights [x ] follow up per protocol    [x ] other: THOMAS remains available, Denisha Tubbs, THOMAS pager #[phone]

## 2018-03-01 DIAGNOSIS — Z71.89 OTHER SPECIFIED COUNSELING: ICD-10-CM

## 2018-03-01 DIAGNOSIS — K59.00 CONSTIPATION, UNSPECIFIED: ICD-10-CM

## 2018-03-01 DIAGNOSIS — Z51.5 ENCOUNTER FOR PALLIATIVE CARE: ICD-10-CM

## 2018-03-01 DIAGNOSIS — R11.2 NAUSEA WITH VOMITING, UNSPECIFIED: ICD-10-CM

## 2018-03-01 LAB
ALBUMIN SERPL ELPH-MCNC: 2.3 G/DL — LOW (ref 3.3–5)
ALP SERPL-CCNC: 651 U/L — HIGH (ref 40–120)
ALT FLD-CCNC: 444 U/L RC — HIGH (ref 10–45)
ANION GAP SERPL CALC-SCNC: 24 MMOL/L — HIGH (ref 5–17)
AST SERPL-CCNC: 620 U/L — HIGH (ref 10–40)
BILIRUB SERPL-MCNC: 6 MG/DL — HIGH (ref 0.2–1.2)
BUN SERPL-MCNC: 24 MG/DL — HIGH (ref 7–23)
CALCIUM SERPL-MCNC: 9 MG/DL — SIGNIFICANT CHANGE UP (ref 8.4–10.5)
CHLORIDE SERPL-SCNC: 94 MMOL/L — LOW (ref 96–108)
CO2 SERPL-SCNC: 16 MMOL/L — LOW (ref 22–31)
CREAT SERPL-MCNC: 0.63 MG/DL — SIGNIFICANT CHANGE UP (ref 0.5–1.3)
GLUCOSE SERPL-MCNC: 216 MG/DL — HIGH (ref 70–99)
MAGNESIUM SERPL-MCNC: 2.1 MG/DL — SIGNIFICANT CHANGE UP (ref 1.6–2.6)
PHOSPHATE SERPL-MCNC: 2.5 MG/DL — SIGNIFICANT CHANGE UP (ref 2.5–4.5)
POTASSIUM SERPL-MCNC: 5.2 MMOL/L — SIGNIFICANT CHANGE UP (ref 3.5–5.3)
POTASSIUM SERPL-SCNC: 5.2 MMOL/L — SIGNIFICANT CHANGE UP (ref 3.5–5.3)
PROT SERPL-MCNC: 5.7 G/DL — LOW (ref 6–8.3)
SODIUM SERPL-SCNC: 134 MMOL/L — LOW (ref 135–145)

## 2018-03-01 PROCEDURE — 71045 X-RAY EXAM CHEST 1 VIEW: CPT | Mod: 26

## 2018-03-01 PROCEDURE — 99232 SBSQ HOSP IP/OBS MODERATE 35: CPT

## 2018-03-01 PROCEDURE — 99233 SBSQ HOSP IP/OBS HIGH 50: CPT | Mod: GC

## 2018-03-01 PROCEDURE — 99231 SBSQ HOSP IP/OBS SF/LOW 25: CPT | Mod: GC

## 2018-03-01 RX ORDER — PANTOPRAZOLE SODIUM 20 MG/1
40 TABLET, DELAYED RELEASE ORAL ONCE
Qty: 0 | Refills: 0 | Status: COMPLETED | OUTPATIENT
Start: 2018-03-01 | End: 2018-03-02

## 2018-03-01 RX ORDER — HALOPERIDOL DECANOATE 100 MG/ML
0.5 INJECTION INTRAMUSCULAR EVERY 6 HOURS
Qty: 0 | Refills: 0 | Status: DISCONTINUED | OUTPATIENT
Start: 2018-03-01 | End: 2018-03-02

## 2018-03-01 RX ORDER — HYDROMORPHONE HYDROCHLORIDE 2 MG/ML
1 INJECTION INTRAMUSCULAR; INTRAVENOUS; SUBCUTANEOUS
Qty: 0 | Refills: 0 | Status: DISCONTINUED | OUTPATIENT
Start: 2018-03-01 | End: 2018-03-02

## 2018-03-01 RX ORDER — ONDANSETRON 8 MG/1
4 TABLET, FILM COATED ORAL EVERY 6 HOURS
Qty: 0 | Refills: 0 | Status: DISCONTINUED | OUTPATIENT
Start: 2018-03-01 | End: 2018-03-02

## 2018-03-01 RX ORDER — POLYETHYLENE GLYCOL 3350 17 G/17G
17 POWDER, FOR SOLUTION ORAL ONCE
Qty: 0 | Refills: 0 | Status: COMPLETED | OUTPATIENT
Start: 2018-03-01 | End: 2018-03-01

## 2018-03-01 RX ORDER — ONDANSETRON 8 MG/1
4 TABLET, FILM COATED ORAL EVERY 6 HOURS
Qty: 0 | Refills: 0 | Status: DISCONTINUED | OUTPATIENT
Start: 2018-03-01 | End: 2018-03-01

## 2018-03-01 RX ORDER — SODIUM CHLORIDE 9 MG/ML
1000 INJECTION INTRAMUSCULAR; INTRAVENOUS; SUBCUTANEOUS
Qty: 0 | Refills: 0 | Status: DISCONTINUED | OUTPATIENT
Start: 2018-03-01 | End: 2018-03-02

## 2018-03-01 RX ORDER — HYDROMORPHONE HYDROCHLORIDE 2 MG/ML
30 INJECTION INTRAMUSCULAR; INTRAVENOUS; SUBCUTANEOUS
Qty: 0 | Refills: 0 | Status: DISCONTINUED | OUTPATIENT
Start: 2018-03-01 | End: 2018-03-02

## 2018-03-01 RX ORDER — NALOXONE HYDROCHLORIDE 4 MG/.1ML
0.1 SPRAY NASAL
Qty: 0 | Refills: 0 | Status: DISCONTINUED | OUTPATIENT
Start: 2018-03-01 | End: 2018-03-02

## 2018-03-01 RX ORDER — ONDANSETRON 8 MG/1
0 TABLET, FILM COATED ORAL
Qty: 0 | Refills: 0 | COMMUNITY
Start: 2018-03-01

## 2018-03-01 RX ADMIN — ONDANSETRON 4 MILLIGRAM(S): 8 TABLET, FILM COATED ORAL at 13:16

## 2018-03-01 RX ADMIN — POLYETHYLENE GLYCOL 3350 17 GRAM(S): 17 POWDER, FOR SOLUTION ORAL at 09:26

## 2018-03-01 RX ADMIN — Medication 1: at 14:28

## 2018-03-01 RX ADMIN — Medication 1: at 09:01

## 2018-03-01 RX ADMIN — HYDROMORPHONE HYDROCHLORIDE 30 MILLILITER(S): 2 INJECTION INTRAMUSCULAR; INTRAVENOUS; SUBCUTANEOUS at 19:23

## 2018-03-01 RX ADMIN — Medication 4 MILLIGRAM(S): at 05:47

## 2018-03-01 RX ADMIN — Medication 100 MILLIGRAM(S): at 05:48

## 2018-03-01 RX ADMIN — HYDROMORPHONE HYDROCHLORIDE 0.5 MILLIGRAM(S): 2 INJECTION INTRAMUSCULAR; INTRAVENOUS; SUBCUTANEOUS at 06:02

## 2018-03-01 RX ADMIN — HYDROMORPHONE HYDROCHLORIDE 30 MILLILITER(S): 2 INJECTION INTRAMUSCULAR; INTRAVENOUS; SUBCUTANEOUS at 14:17

## 2018-03-01 RX ADMIN — HYDROMORPHONE HYDROCHLORIDE 30 MILLILITER(S): 2 INJECTION INTRAMUSCULAR; INTRAVENOUS; SUBCUTANEOUS at 19:06

## 2018-03-01 RX ADMIN — ONDANSETRON 4 MILLIGRAM(S): 8 TABLET, FILM COATED ORAL at 05:53

## 2018-03-01 RX ADMIN — Medication 4 MILLIGRAM(S): at 18:45

## 2018-03-01 RX ADMIN — ONDANSETRON 4 MILLIGRAM(S): 8 TABLET, FILM COATED ORAL at 20:03

## 2018-03-01 RX ADMIN — SODIUM CHLORIDE 50 MILLILITER(S): 9 INJECTION, SOLUTION INTRAVENOUS at 19:25

## 2018-03-01 RX ADMIN — HYDROMORPHONE HYDROCHLORIDE 0.5 MILLIGRAM(S): 2 INJECTION INTRAMUSCULAR; INTRAVENOUS; SUBCUTANEOUS at 06:22

## 2018-03-01 NOTE — PROGRESS NOTE ADULT - PROBLEM SELECTOR PLAN 1
Plan was for treatment with single agent Irinotecan this week with palliative intent.   CMP continues to show worsening LFTs with progressively rising bilirubin.  With the elevated bilirubin he is not a candidate for chemotherapy as it will only make his symptoms worse as he will not be able to clear the chemo.  Sonogram shows diffuse involvement in liver but no evidence of obstruction which could be stented or drained.  Prognosis poor. Discussed with family and medical team.    Family meeting this morning and they would like to optimize pain and mgmt of N/V and then take him home with hospice.  Extensive family presence and support.

## 2018-03-01 NOTE — PROGRESS NOTE ADULT - ATTENDING COMMENTS
PCA pump for pain control and home hospice as per palliative note from meeting today.  Continue symptom management.

## 2018-03-01 NOTE — PROVIDER CONTACT NOTE (OTHER) - ACTION/TREATMENT ORDERED:
Okay to give ativan, MD states it has worked for pt in past.
HOB >30 , Zofran IVP given.
MD notified, no interventions given at this time, will continue to monitor.
will continue to monitor

## 2018-03-01 NOTE — GOALS OF CARE CONVERSATION - PERSONAL ADVANCE DIRECTIVE - TREATMENT GUIDELINES
No blood draws/No artificial nutrition/Do not re-hospitalize/No IV fluids/DNR Order/Comfort measures only

## 2018-03-01 NOTE — PROGRESS NOTE ADULT - SUBJECTIVE AND OBJECTIVE BOX
Patient is a 62y old  Male who presents with a chief complaint of Weakness, N/V, (22 Feb 2018 17:59)    SUBJECTIVE / OVERNIGHT EVENTS:  Patient episode of dark brown emesis overnight.     MEDICATIONS  (STANDING):  dexamethasone  Injectable 4 milliGRAM(s) IV Push two times a day  dextrose 5% + sodium chloride 0.9%. 1000 milliLiter(s) (50 mL/Hr) IV Continuous <Continuous>  dextrose 5%. 1000 milliLiter(s) (50 mL/Hr) IV Continuous <Continuous>  dextrose 50% Injectable 12.5 Gram(s) IV Push once  dextrose 50% Injectable 25 Gram(s) IV Push once  dextrose 50% Injectable 25 Gram(s) IV Push once  docusate sodium 100 milliGRAM(s) Oral three times a day  insulin lispro (HumaLOG) corrective regimen sliding scale   SubCutaneous three times a day before meals  insulin lispro (HumaLOG) corrective regimen sliding scale   SubCutaneous at bedtime  multivitamin 1 Tablet(s) Oral daily  pantoprazole    Tablet 40 milliGRAM(s) Oral before breakfast  senna 2 Tablet(s) Oral at bedtime    MEDICATIONS  (PRN):  bisacodyl Suppository 10 milliGRAM(s) Rectal daily PRN Constipation  dextrose Gel 1 Dose(s) Oral once PRN Blood Glucose LESS THAN 70 milliGRAM(s)/deciliter  glucagon  Injectable 1 milliGRAM(s) IntraMuscular once PRN Glucose LESS THAN 70 milligrams/deciliter  HYDROmorphone  Injectable 0.5 milliGRAM(s) IV Push every 4 hours PRN Moderate Pain (4 - 6)  HYDROmorphone  Injectable 1 milliGRAM(s) IV Push every 4 hours PRN Severe Pain (7 - 10)  LORazepam   Injectable 0.5 milliGRAM(s) IV Push every 4 hours PRN Nausea and/or Vomiting  ondansetron Injectable 4 milliGRAM(s) IV Push every 6 hours PRN Nausea and/or Vomiting    Vital Signs Last 24 Hrs  T(C): 36.4 (01 Mar 2018 06:02), Max: 36.9 (28 Feb 2018 15:12)  T(F): 97.6 (01 Mar 2018 06:02), Max: 98.4 (28 Feb 2018 15:12)  HR: 95 (01 Mar 2018 06:02) (89 - 99)  BP: 135/60 (01 Mar 2018 06:02) (109/74 - 139/70)  RR: 18 (01 Mar 2018 06:02) (16 - 18)  SpO2: 96% (01 Mar 2018 06:02) (93% - 98%)  CAPILLARY BLOOD GLUCOSE    POCT Blood Glucose.: 181 mg/dL (28 Feb 2018 22:19)  POCT Blood Glucose.: 168 mg/dL (28 Feb 2018 18:27)  POCT Blood Glucose.: 242 mg/dL (28 Feb 2018 12:35)  POCT Blood Glucose.: 185 mg/dL (28 Feb 2018 08:23)    I&O's Summary    27 Feb 2018 07:01  -  28 Feb 2018 07:00  --------------------------------------------------------  IN: 1400 mL / OUT: 1150 mL / NET: 250 mL    28 Feb 2018 07:01  -  01 Mar 2018 06:21  --------------------------------------------------------  IN: 1820 mL / OUT: 1100 mL / NET: 720 mL    PHYSICAL EXAM:  GENERAL: Weak, NAD  HEAD:  Atraumatic, Normocephalic  EYES: EOMI, conjunctiva and sclera clear  NECK: Supple, No JVD  CHEST/LUNG: Decreased breath sounds on the Right side, No wheeze  HEART: Regular rate and rhythm; Normal S1 S2, No murmurs, rubs, or gallops  ABDOMEN: Soft, Nontender, Nondistended; Bowel sounds present  EXTREMITIES:  2+ Peripheral Pulses, No clubbing, cyanosis, or edema  PSYCH: AAOx3  NEUROLOGY: non-focal  SKIN: No rashes or lesions    LABS:                        14.0   17.02 )-----------( 332      ( 28 Feb 2018 09:16 )             42.7     02-28    135  |  95<L>  |  20  ----------------------------<  176<H>  4.6   |  21<L>  |  0.57    Ca    8.8      28 Feb 2018 07:26  Phos  2.4     02-28  Mg     2.0     02-28    TPro  x   /  Alb  x   /  TBili  5.4<H>  /  DBili  x   /  AST  x   /  ALT  x   /  AlkPhos  x   02-28    New labs pending.       RADIOLOGY & ADDITIONAL TESTS:    Imaging Personally Reviewed:    Consultant(s) Notes Reviewed:  Gastro, Palliative     Care Discussed with Consultants/Other Providers: Palliative Patient is a 62y old  Male who presents with a chief complaint of Weakness, N/V, (22 Feb 2018 17:59)    SUBJECTIVE / OVERNIGHT EVENTS:  Patient had one episode of dark brown emesis overnight after drinking chocolate glucerna. Abdominal pain is well controlled with dilaudid. Patient last bowel movement was last weeks, informed to take bisocdyl suppository. Ordered miralax. Patient endorsed generalized weakness and non-productive cough. Patient denied fever, chills, sob, chest pain, headaches, blurry vision, lightheadiness, dizziness, focal weakness.     MEDICATIONS  (STANDING):  dexamethasone  Injectable 4 milliGRAM(s) IV Push two times a day  dextrose 5% + sodium chloride 0.9%. 1000 milliLiter(s) (50 mL/Hr) IV Continuous <Continuous>  dextrose 5%. 1000 milliLiter(s) (50 mL/Hr) IV Continuous <Continuous>  dextrose 50% Injectable 12.5 Gram(s) IV Push once  dextrose 50% Injectable 25 Gram(s) IV Push once  dextrose 50% Injectable 25 Gram(s) IV Push once  docusate sodium 100 milliGRAM(s) Oral three times a day  insulin lispro (HumaLOG) corrective regimen sliding scale   SubCutaneous three times a day before meals  insulin lispro (HumaLOG) corrective regimen sliding scale   SubCutaneous at bedtime  multivitamin 1 Tablet(s) Oral daily  pantoprazole    Tablet 40 milliGRAM(s) Oral before breakfast  senna 2 Tablet(s) Oral at bedtime    MEDICATIONS  (PRN):  bisacodyl Suppository 10 milliGRAM(s) Rectal daily PRN Constipation  dextrose Gel 1 Dose(s) Oral once PRN Blood Glucose LESS THAN 70 milliGRAM(s)/deciliter  glucagon  Injectable 1 milliGRAM(s) IntraMuscular once PRN Glucose LESS THAN 70 milligrams/deciliter  HYDROmorphone  Injectable 0.5 milliGRAM(s) IV Push every 4 hours PRN Moderate Pain (4 - 6)  HYDROmorphone  Injectable 1 milliGRAM(s) IV Push every 4 hours PRN Severe Pain (7 - 10)  LORazepam   Injectable 0.5 milliGRAM(s) IV Push every 4 hours PRN Nausea and/or Vomiting  ondansetron Injectable 4 milliGRAM(s) IV Push every 6 hours PRN Nausea and/or Vomiting    Vital Signs Last 24 Hrs  T(C): 36.4 (01 Mar 2018 06:02), Max: 36.9 (28 Feb 2018 15:12)  T(F): 97.6 (01 Mar 2018 06:02), Max: 98.4 (28 Feb 2018 15:12)  HR: 95 (01 Mar 2018 06:02) (89 - 99)  BP: 135/60 (01 Mar 2018 06:02) (109/74 - 139/70)  RR: 18 (01 Mar 2018 06:02) (16 - 18)  SpO2: 96% (01 Mar 2018 06:02) (93% - 98%)  CAPILLARY BLOOD GLUCOSE    POCT Blood Glucose.: 181 mg/dL (28 Feb 2018 22:19)  POCT Blood Glucose.: 168 mg/dL (28 Feb 2018 18:27)  POCT Blood Glucose.: 242 mg/dL (28 Feb 2018 12:35)  POCT Blood Glucose.: 185 mg/dL (28 Feb 2018 08:23)    I&O's Summary    27 Feb 2018 07:01  -  28 Feb 2018 07:00  --------------------------------------------------------  IN: 1400 mL / OUT: 1150 mL / NET: 250 mL    28 Feb 2018 07:01  -  01 Mar 2018 06:21  --------------------------------------------------------  IN: 1820 mL / OUT: 1100 mL / NET: 720 mL    PHYSICAL EXAM:  GENERAL: Weak, NAD  HEAD:  Atraumatic, Normocephalic  EYES: EOMI, conjunctiva and sclera clear  NECK: Supple, No JVD  CHEST/LUNG: Decreased breath sounds on the Right side, No wheeze  HEART: Regular rate and rhythm; Normal S1 S2, No murmurs, rubs, or gallops  ABDOMEN: Soft, Nontender, Nondistended; Bowel sounds present  EXTREMITIES:  2+ Peripheral Pulses, No clubbing, cyanosis, or edema  PSYCH: AAOx3  NEUROLOGY: non-focal  SKIN: No rashes or lesions    LABS:               14.0   17.02 )-----------( 332      ( 28 Feb 2018 09:16 )             42.7     Hgb Trend: 14.0<--, 13.9<--, 14.0<--, 15.0<--  02-28    135  |  95<L>  |  20  ----------------------------<  176<H>  4.6   |  21<L>  |  0.57    Ca    8.8      28 Feb 2018 07:26  Phos  2.4     02-28  Mg     2.0     02-28    TPro  x   /  Alb  x   /  TBili  5.4<H>  /  DBili  x   /  AST  x   /  ALT  x   /  AlkPhos  x   02-28    Creatinine Trend: 0.57<--, 0.47<--, 0.54<--, 0.72<--, 0.61<--, 0.76<--      RADIOLOGY & ADDITIONAL TESTS:    Imaging Personally Reviewed:    Consultant(s) Notes Reviewed:  Gastro, Palliative     Care Discussed with Consultants/Other Providers: Palliative

## 2018-03-01 NOTE — PROGRESS NOTE ADULT - SUBJECTIVE AND OBJECTIVE BOX
INTERVAL HPI/OVERNIGHT EVENTS:  Patient awake and alert, able to participate in family meeting.  Held off on pain meds so he could be alert.  Pain scale >7 throughout meeting.      Allergies    No Known Allergies    Intolerances      ADVANCE DIRECTIVES:    DNR   MOLST [ ] YES [ x] NO     MEDICATIONS  (STANDING):  dexamethasone  Injectable 4 milliGRAM(s) IV Push two times a day  dextrose 5% + sodium chloride 0.9%. 1000 milliLiter(s) (50 mL/Hr) IV Continuous <Continuous>  dextrose 5%. 1000 milliLiter(s) (50 mL/Hr) IV Continuous <Continuous>  dextrose 50% Injectable 12.5 Gram(s) IV Push once  dextrose 50% Injectable 25 Gram(s) IV Push once  dextrose 50% Injectable 25 Gram(s) IV Push once  docusate sodium 100 milliGRAM(s) Oral three times a day  insulin lispro (HumaLOG) corrective regimen sliding scale   SubCutaneous three times a day before meals  insulin lispro (HumaLOG) corrective regimen sliding scale   SubCutaneous at bedtime  multivitamin 1 Tablet(s) Oral daily  pantoprazole    Tablet 40 milliGRAM(s) Oral before breakfast  senna 2 Tablet(s) Oral at bedtime    MEDICATIONS  (PRN):  bisacodyl Suppository 10 milliGRAM(s) Rectal daily PRN Constipation  dextrose Gel 1 Dose(s) Oral once PRN Blood Glucose LESS THAN 70 milliGRAM(s)/deciliter  glucagon  Injectable 1 milliGRAM(s) IntraMuscular once PRN Glucose LESS THAN 70 milligrams/deciliter  HYDROmorphone  Injectable 0.5 milliGRAM(s) IV Push every 4 hours PRN Moderate Pain (4 - 6)  HYDROmorphone  Injectable 1 milliGRAM(s) IV Push every 4 hours PRN Severe Pain (7 - 10)  LORazepam   Injectable 0.5 milliGRAM(s) IV Push every 4 hours PRN Nausea and/or Vomiting  ondansetron Injectable 4 milliGRAM(s) IV Push every 6 hours PRN Nausea and/or Vomiting      PRESENT SYMPTOMS:  Source: [x ] Patient   [ ] Family   [ ] Team     Pain:                        [ ] No [x ] Yes             [ ] Mild [x ] Moderate [x ] Severe    Onset -acute  Location -abdomen  Duration -persistent  Character -dull achy  Alleviating/Aggravating -dilaudid helps but only for a few hours  Radiation - none reported  Timing - worse at night    Dyspnea:                [ x] No [ ] Yes             [ ] Mild [ ] Moderate [ ] Severe    Anxiety:                  [x ] No [ ] Yes             [ ] Mild [ ] Moderate [ ] Severe    Fatigue:                  [ ] No [x ] Yes             [ ] Mild [x ] Moderate [ ] Severe    Nausea:                  [ ] No [x ] Yes             [ ] Mild [x ] Moderate [ ] Severe    Loss of appetite:   [ ] No [xx ] Yes             [ ] Mildx [x ] Moderate [ ] Severe    Constipation:        [ ] No [x ] Yes             [ ] Mild [x ] Moderate [ ] Severe    Other Symptoms:  [ x] All other review of systems negative   [ ] Unable to obtain due to poor mentation     Karnofsky Performance Score/Palliative Performance Status Version 2:     30-40    %    PHYSICAL EXAM:  Vital Signs Last 24 Hrs  T(C): 36.2 (01 Mar 2018 09:30), Max: 36.9 (28 Feb 2018 15:12)  T(F): 97.2 (01 Mar 2018 09:30), Max: 98.4 (28 Feb 2018 15:12)  HR: 98 (01 Mar 2018 09:30) (89 - 99)  BP: 123/81 (01 Mar 2018 09:30) (114/78 - 139/70)  BP(mean): --  RR: 18 (01 Mar 2018 09:30) (16 - 18)  SpO2: 97% (01 Mar 2018 09:30) (95% - 98%) I&O's Summary    28 Feb 2018 07:01  -  01 Mar 2018 07:00  --------------------------------------------------------  IN: 1820 mL / OUT: 1100 mL / NET: 720 mL    01 Mar 2018 07:01  -  01 Mar 2018 12:34  --------------------------------------------------------  IN: 0 mL / OUT: 400 mL / NET: -400 mL        General:  [x ] Alert  [x ] Oriented x    3  [ ] Lethargic  [ ] Agitated   [ x] Cachexia   [ ] Unarousable  [ x] Verbal  [ ] Non-Verbal    HEENT:  [ ] Normal   [ ] Dry mouth   [ ] ET Tube    [ ] Trach  [ ] Oral lesions    Lungs:   [x ] Clear [ ] Tachypnea  [ ] Audible excessive secretions   [ ] Rhonchi        [ ] Right [ ] Left [ ] Bilateral  [ ] Crackles        [ ] Right [ ] Left [ ] Bilateral  [ ] Wheezing     [ ] Right [ ] Left [ ] Bilateral    Cardiovascular:  [ ] Regular [ ] Irregular [x ] Tachycardia   [ ] Bradycardia  [ ] Murmur [ ] Other    Abdomen: [ ] Soft  [ x] Distended   [x ]Hypoactive +BS  [ ] Non tender [ x] Tender  [ ]PEG   [ ]OGT/ NGT   Last BM:       Genitourinary: [x ] Normal [ ] Incontinent   [ ] Oliguria/Anuria   [ ] Tovar    Musculoskeletal:  [ ] Normal   [x ] Weakness  [ ] Bedbound/Wheelchair bound [ ] Edema    Neurological: [ x] No focal deficits  [ ] Cognitive impairment  [ ] Dysphagia [ ] Dysarthria [ ] Paresis [ ] Other     Skin: [x Normal   [ ] Pressure ulcer(s)                  [ ] Rash    LABS:                        14.3   18.9  )-----------( 367      ( 01 Mar 2018 09:37 )             45.9     03-01    134<L>  |  94<L>  |  24<H>  ----------------------------<  216<H>  5.2   |  16<L>  |  0.63    Ca    9.0      01 Mar 2018 09:32  Phos  2.5     03-01  Mg     2.1     03-01    TPro  5.7<L>  /  Alb  2.3<L>  /  TBili  6.0<H>  /  DBili  x   /  AST  620<H>  /  ALT  444<H>  /  AlkPhos  651<H>  03-01          Shock: [ ] Septic [ ] Cardiogenic [ ] Neurologic [ ] Hypovolemic  Vasopressors x   Inotrophs x     Oral Intake: [ ] Unable/mouth care only [ ] Minimal [ ] Moderate [x ] Full Capability    Diet: [ ] NPO [ ] Tube feeds [ ] TPN [ ] Other     RADIOLOGY & ADDITIONAL STUDIES:    REFERRALS:   [x ] Chaplaincy  [x ] Hospice  [ ] Child Life  [ ] Social Work  [ ] Case management [ ] Holistic Therapy good, to achieve stated therapy goals

## 2018-03-01 NOTE — PROGRESS NOTE ADULT - SUBJECTIVE AND OBJECTIVE BOX
SUBJECTIVE:  - Pt had an episode of emesis of glucerna contents last night  - No fever or chills  - Pain is controlled  - Family meeting is scheduled today for goals discussion    OBJECTIVE:    Vital Signs Last 24 Hrs  T(C): 36.2 (01 Mar 2018 09:30), Max: 36.9 (28 Feb 2018 15:12)  T(F): 97.2 (01 Mar 2018 09:30), Max: 98.4 (28 Feb 2018 15:12)  HR: 98 (01 Mar 2018 09:30) (89 - 99)  BP: 123/81 (01 Mar 2018 09:30) (114/78 - 139/70)  BP(mean): --  RR: 18 (01 Mar 2018 09:30) (16 - 18)  SpO2: 97% (01 Mar 2018 09:30) (95% - 98%)    PHYSICAL EXAM:  Constitutional: no acute distress  Eyes: no icterus  Neck: no masses, no LAD  Respiratory: normal inspiratory effort; no wheezing or crackles  Cardiovascular: RRR, normal S1/S2, no murmurs/rubs/gallops  Gastrointestinal: soft, nondistended, nontender, +BS  Extremities: no LE edema  Neurological: AAOx3, no asterixis  Skin: no rashes, bruises, petechiae    LABS:                        14.3   18.9  )-----------( 367      ( 01 Mar 2018 09:37 )             45.9     134<L>  |  94<L>  |  24<H>  ----------------------------<  216<H>  5.2   |  16<L>  |  0.63    Ca    9.0      01 Mar 2018 09:32  Phos  2.5     03-01  Mg     2.1     03-01    TPro  5.7<L>  /  Alb  2.3<L>  /  TBili  6.0<H>  /  DBili  x   /  AST  620<H>  /  ALT  444<H>  /  AlkPhos  651<H>  03-01  LIVER FUNCTIONS - ( 01 Mar 2018 09:32 )  Alb: 2.3 g/dL / Pro: 5.7 g/dL / ALK PHOS: 651 U/L / ALT: 444 U/L RC / AST: 620 U/L / GGT: x

## 2018-03-01 NOTE — PROVIDER CONTACT NOTE (OTHER) - ASSESSMENT
Pt vomited 200 cc. while resting in bed
50 cc dark brown emesis
Pt is resting with no acute signs and symptoms of any acute distress, chest pain or SOB
patient stated ate yogurt around 2000 was feeling full

## 2018-03-01 NOTE — PROGRESS NOTE ADULT - ASSESSMENT
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 He p/w reduced oral intake and appetite.    Impression:  1) Stage IV Esophageal Adenocarcinoma - s/p 23 mm x 15 cm WallFlex partially covered stent placement (2/22/2018).   2) Elevated liver enzymes - likely due to metastatic liver disease.  3) Nausea/Emesis - due to dysmotility vs brain metastases    Plan:  - Continue pureed diet as tolerated  - No further advancement of diet at this time until GI followup on discharge  - Pain and symptom control per primary team  - Monitor electrolytes  - Avoid hepatotoxic agents

## 2018-03-01 NOTE — GOALS OF CARE CONVERSATION - PERSONAL ADVANCE DIRECTIVE - CONVERSATION/DISCUSSION
Diagnosis/Prognosis/Treatment Options/Palliative Care Referral
Treatment Options/Prognosis/Diagnosis/Chaplaincy Referral/Palliative Care Referral/Hospice Referral

## 2018-03-01 NOTE — PROVIDER CONTACT NOTE (OTHER) - SITUATION
Pt vomited 200 cc. Ativan 0.5mg given for nausea that's ordered.
EKG revealed sinus rhythm with short AK interval
Patient s/p Esophogeal stent placement  vomited 50ml light green thick stomach content
Pt vomited approx 50cc dark brown emesis. Pt has had ongoing n/v.

## 2018-03-01 NOTE — PROGRESS NOTE ADULT - SUBJECTIVE AND OBJECTIVE BOX
Chief Complaint: esophageal ca    INTERVAL HPI/OVERNIGHT EVENTS: Pt s/p stent placement on 2/22. Persistent nausea and vomiting.  Pain a problem but does respond to the pain meds.    Vital Signs Last 24 Hrs  T(C): 36.4 (01 Mar 2018 14:11), Max: 36.5 (28 Feb 2018 18:35)  T(F): 97.6 (01 Mar 2018 14:11), Max: 97.7 (28 Feb 2018 18:35)  HR: 90 (01 Mar 2018 14:11) (90 - 99)  BP: 130/84 (01 Mar 2018 14:11) (114/78 - 135/60)  BP(mean): --  RR: 18 (01 Mar 2018 14:11) (16 - 18)  SpO2: 98% (01 Mar 2018 14:11) (95% - 98%)    Physical Exam:  Gen:  awake and alert  HEENT:  MMM O/P clear, temporal wasting  Neck:  Supple, No LAD   Lungs:  clear  CVS:  Normal S1 S2   Abd:  +BS, soft, mild tenderness in RUQ to palpation  Ext:  Trace LE edema  Skin:  No rash  Neuro:  grossly intact     LABS:                          14.3   18.9  )-----------( 367      ( 01 Mar 2018 09:37 )             45.9   03-01    134<L>  |  94<L>  |  24<H>  ----------------------------<  216<H>  5.2   |  16<L>  |  0.63    Ca    9.0      01 Mar 2018 09:32  Phos  2.5     03-01  Mg     2.1     03-01    TPro  5.7<L>  /  Alb  2.3<L>  /  TBili  6.0<H>  /  DBili  x   /  AST  620<H>  /  ALT  444<H>  /  AlkPhos  651<H>  03-01

## 2018-03-01 NOTE — PROVIDER CONTACT NOTE (OTHER) - BACKGROUND
esophageal ca
2/20: weakness, FTT, recently dx w/ esophageal ca
Admitted with Weakness , FTT, Esophageal CA, and stent placement
HX Esophageal Ca

## 2018-03-01 NOTE — PROGRESS NOTE ADULT - ATTENDING COMMENTS
As above.  Nausea/emesis may be multifactoral:  Opioid pain meds, brain mets, esophageal stricture/inflammation.  Recommend adjusting antiemetics, pain meds, and pureed diet as tolerated.  May downgrade to liquid diet as necessary.  Family and pt have chosen hospice, which is reasonable.

## 2018-03-01 NOTE — GOALS OF CARE CONVERSATION - PERSONAL ADVANCE DIRECTIVE - CONVERSATION DETAILS
Palliative team called for goals of care discussion. Patient identified his wife Chana as his HCP. He is a full code at this time and wants to pursue any and all treatments he can for his diagnosis. Patient was completely independent, feeling well and working full time up until approximately one month ago when his symptoms began. He feels that if his nutritional status can be improved, he will have the ability to receive the treatment he desires. He is accepting of treatment for symptom management of his pain, nausea, and weakness. Awaits plan of care for disease modifying therapies.  Oncologist ,  Dr. Martinez
Met with extended family, Chana-wife, Radha - daughter, Jose Luis-son  Discussed advanced cancer diagnosis and no disease modifying therapies available at this time.  They are all in agreeement with home hospice care, pain management via pca pump, and what is most important to patient and family is quality of life for what every time is left.  Hospice has been called for referral.    No further blood draws, no antibiotics, no invasive procedures except for PICC line for PCA pump, no further iv fluids,  no further diagnostics.Pt agrees that DNR/DNI is the most appropriate next step.

## 2018-03-02 VITALS — RESPIRATION RATE: 22 BRPM | OXYGEN SATURATION: 85 %

## 2018-03-02 LAB — GLUCOSE BLDC GLUCOMTR-MCNC: 174 MG/DL — HIGH (ref 70–99)

## 2018-03-02 PROCEDURE — 85610 PROTHROMBIN TIME: CPT

## 2018-03-02 PROCEDURE — 82248 BILIRUBIN DIRECT: CPT

## 2018-03-02 PROCEDURE — 84100 ASSAY OF PHOSPHORUS: CPT

## 2018-03-02 PROCEDURE — 71046 X-RAY EXAM CHEST 2 VIEWS: CPT

## 2018-03-02 PROCEDURE — 81003 URINALYSIS AUTO W/O SCOPE: CPT

## 2018-03-02 PROCEDURE — 83735 ASSAY OF MAGNESIUM: CPT

## 2018-03-02 PROCEDURE — 93005 ELECTROCARDIOGRAM TRACING: CPT

## 2018-03-02 PROCEDURE — 97161 PT EVAL LOW COMPLEX 20 MIN: CPT

## 2018-03-02 PROCEDURE — 86900 BLOOD TYPING SEROLOGIC ABO: CPT

## 2018-03-02 PROCEDURE — 76705 ECHO EXAM OF ABDOMEN: CPT

## 2018-03-02 PROCEDURE — 84132 ASSAY OF SERUM POTASSIUM: CPT

## 2018-03-02 PROCEDURE — 74177 CT ABD & PELVIS W/CONTRAST: CPT

## 2018-03-02 PROCEDURE — 82010 KETONE BODYS QUAN: CPT

## 2018-03-02 PROCEDURE — 80076 HEPATIC FUNCTION PANEL: CPT

## 2018-03-02 PROCEDURE — 71045 X-RAY EXAM CHEST 1 VIEW: CPT

## 2018-03-02 PROCEDURE — 87040 BLOOD CULTURE FOR BACTERIA: CPT

## 2018-03-02 PROCEDURE — 82947 ASSAY GLUCOSE BLOOD QUANT: CPT

## 2018-03-02 PROCEDURE — 86901 BLOOD TYPING SEROLOGIC RH(D): CPT

## 2018-03-02 PROCEDURE — 86850 RBC ANTIBODY SCREEN: CPT

## 2018-03-02 PROCEDURE — 85730 THROMBOPLASTIN TIME PARTIAL: CPT

## 2018-03-02 PROCEDURE — 83605 ASSAY OF LACTIC ACID: CPT

## 2018-03-02 PROCEDURE — 84295 ASSAY OF SERUM SODIUM: CPT

## 2018-03-02 PROCEDURE — 82435 ASSAY OF BLOOD CHLORIDE: CPT

## 2018-03-02 PROCEDURE — 99285 EMERGENCY DEPT VISIT HI MDM: CPT | Mod: 25

## 2018-03-02 PROCEDURE — 82330 ASSAY OF CALCIUM: CPT

## 2018-03-02 PROCEDURE — 85027 COMPLETE CBC AUTOMATED: CPT

## 2018-03-02 PROCEDURE — 85014 HEMATOCRIT: CPT

## 2018-03-02 PROCEDURE — C1874: CPT

## 2018-03-02 PROCEDURE — 99233 SBSQ HOSP IP/OBS HIGH 50: CPT

## 2018-03-02 PROCEDURE — 80048 BASIC METABOLIC PNL TOTAL CA: CPT

## 2018-03-02 PROCEDURE — C1751: CPT

## 2018-03-02 PROCEDURE — 82803 BLOOD GASES ANY COMBINATION: CPT

## 2018-03-02 PROCEDURE — C1769: CPT

## 2018-03-02 PROCEDURE — 36569 INSJ PICC 5 YR+ W/O IMAGING: CPT

## 2018-03-02 PROCEDURE — 82962 GLUCOSE BLOOD TEST: CPT

## 2018-03-02 PROCEDURE — 80053 COMPREHEN METABOLIC PANEL: CPT

## 2018-03-02 PROCEDURE — 87086 URINE CULTURE/COLONY COUNT: CPT

## 2018-03-02 RX ORDER — HYDROMORPHONE HYDROCHLORIDE 2 MG/ML
30 INJECTION INTRAMUSCULAR; INTRAVENOUS; SUBCUTANEOUS
Qty: 0 | Refills: 0 | Status: DISCONTINUED | OUTPATIENT
Start: 2018-03-02 | End: 2018-03-02

## 2018-03-02 RX ORDER — HYDROMORPHONE HYDROCHLORIDE 2 MG/ML
0.3 INJECTION INTRAMUSCULAR; INTRAVENOUS; SUBCUTANEOUS
Qty: 0 | Refills: 0 | Status: DISCONTINUED | OUTPATIENT
Start: 2018-03-02 | End: 2018-03-02

## 2018-03-02 RX ADMIN — HYDROMORPHONE HYDROCHLORIDE 30 MILLILITER(S): 2 INJECTION INTRAMUSCULAR; INTRAVENOUS; SUBCUTANEOUS at 07:15

## 2018-03-02 RX ADMIN — ONDANSETRON 4 MILLIGRAM(S): 8 TABLET, FILM COATED ORAL at 13:51

## 2018-03-02 RX ADMIN — HYDROMORPHONE HYDROCHLORIDE 30 MILLILITER(S): 2 INJECTION INTRAMUSCULAR; INTRAVENOUS; SUBCUTANEOUS at 11:50

## 2018-03-02 RX ADMIN — SODIUM CHLORIDE 10 MILLILITER(S): 9 INJECTION INTRAMUSCULAR; INTRAVENOUS; SUBCUTANEOUS at 08:49

## 2018-03-02 RX ADMIN — PANTOPRAZOLE SODIUM 40 MILLIGRAM(S): 20 TABLET, DELAYED RELEASE ORAL at 06:13

## 2018-03-02 RX ADMIN — Medication 4 MILLIGRAM(S): at 05:41

## 2018-03-02 RX ADMIN — Medication 1: at 13:41

## 2018-03-02 RX ADMIN — ONDANSETRON 4 MILLIGRAM(S): 8 TABLET, FILM COATED ORAL at 02:11

## 2018-03-02 RX ADMIN — Medication 1: at 08:59

## 2018-03-02 RX ADMIN — ONDANSETRON 4 MILLIGRAM(S): 8 TABLET, FILM COATED ORAL at 08:49

## 2018-03-02 NOTE — PROGRESS NOTE ADULT - SUBJECTIVE AND OBJECTIVE BOX
INTERVAL HPI/OVERNIGHT EVENTS:  Patient lethargic. PCA infusing with basal rate.  Pt resting comfortably w/o disress.      Allergies    No Known Allergies    Intolerances      ADVANCE DIRECTIVES:    DNR   MOLST [ ] YES [ x] NO     MEDICATIONS  (STANDING):  dexamethasone  Injectable 4 milliGRAM(s) IV Push two times a day  dextrose 50% Injectable 12.5 Gram(s) IV Push once  dextrose 50% Injectable 25 Gram(s) IV Push once  dextrose 50% Injectable 25 Gram(s) IV Push once  docusate sodium 100 milliGRAM(s) Oral three times a day  HYDROmorphone PCA (1 mG/mL) 30 milliLiter(s) PCA Continuous PCA Continuous  insulin lispro (HumaLOG) corrective regimen sliding scale   SubCutaneous three times a day before meals  insulin lispro (HumaLOG) corrective regimen sliding scale   SubCutaneous at bedtime  ondansetron Injectable 4 milliGRAM(s) IV Push every 6 hours  senna 2 Tablet(s) Oral at bedtime  sodium chloride 0.9%. 1000 milliLiter(s) (10 mL/Hr) IV Continuous <Continuous>    MEDICATIONS  (PRN):  bisacodyl Suppository 10 milliGRAM(s) Rectal daily PRN Constipation  dextrose Gel 1 Dose(s) Oral once PRN Blood Glucose LESS THAN 70 milliGRAM(s)/deciliter  glucagon  Injectable 1 milliGRAM(s) IntraMuscular once PRN Glucose LESS THAN 70 milligrams/deciliter  haloperidol    Injectable 0.5 milliGRAM(s) IV Push every 6 hours PRN nausea / vomiting  HYDROmorphone PCA (1 mG/mL) Rescue Clinician Bolus 1 milliGRAM(s) IV Push every 1 hour PRN for Pain Scale GREATER THAN 6  LORazepam   Injectable 0.5 milliGRAM(s) IV Push every 4 hours PRN agitation/anxiety  naloxone Injectable 0.1 milliGRAM(s) IV Push every 3 minutes PRN For ANY of the following changes in patient status:  A. RR LESS THAN 10 breaths per minute, B. Oxygen saturation LESS THAN 90%, C. Sedation score of 6        PRESENT SYMPTOMS:  Source: [ ] Patient   x ] Family   [x ] Team     Pain:                        [ ] No [ ] Yes             [ ] Mild [ ] Moderate [ ] Severe  pt unable to describe pain today as he is lethargic and unable to answer questions    Onset -  Location   Duration -  Character -  Alleviating/Aggravating   Radiation -  Timing -    Dyspnea:                [ x] No [ ] Yes             [ ] Mild [ ] Moderate [ ] Severe    Anxiety:                  [x ] No [ ] Yes             [ ] Mild [ ] Moderate [ ] Severe    Fatigue:                  [ ] No [x ] Yes             [ ] Mild [x ] Moderate [ ] Severe    Nausea:                  [ ] No [x ] Yes             [ ] Mild [x ] Moderate [ ] Severe    Loss of appetite:   [ ] No [xx ] Yes             [ ] Mild [x ] Moderate [ ] Severe    Constipation:        [ ] No [x ] Yes             [ ] Mild [x ] Moderate [ ] Severe    Other Symptoms:  [ ] All other review of systems negative    ] Unable to obtain due to poor mentation     Karnofsky Performance Score/Palliative Performance Status Version 2:     30-40    %    PHYSICAL EXAM:  Vital Signs Last 24 Hrs  T(C): 36.9 (01 Mar 2018 19:28), Max: 36.9 (01 Mar 2018 19:28)  T(F): 98.4 (01 Mar 2018 19:28), Max: 98.4 (01 Mar 2018 19:28)  HR: 97 (01 Mar 2018 19:28) (90 - 97)  BP: 130/83 (01 Mar 2018 19:28) (130/80 - 134/85)  BP(mean): --  RR: 18 (01 Mar 2018 19:28) (18 - 18)  SpO2: 95% (01 Mar 2018 19:28) (95% - 98%)  28 Feb 2018 07:01  -  01 Mar 2018 07:00  --------------------------------------------------------  IN: 1820 mL / OUT: 1100 mL / NET: 720 mL    01 Mar 2018 07:01  -  01 Mar 2018 12:34  --------------------------------------------------------  IN: 0 mL / OUT: 400 mL / NET: -400 mL        General:  [ ] Alert  [x ] Oriented   [x ] Lethargic  [ ] Agitated   [ x] Cachexia   [x ] Unarousable  [ ] Verbal  [x ] Non-Verbal    HEENT:  [ ] Normal   [x ] Dry mouth   [ ] ET Tube    [ ] Trach  [ ] Oral lesions    Lungs:   [x ] Clear [ ] Tachypnea  [ ] Audible excessive secretions   [ ] Rhonchi        [ ] Right [ ] Left [ ] Bilateral  [ ] Crackles        [ ] Right [ ] Left [ ] Bilateral  [ ] Wheezing     [ ] Right [ ] Left [ ] Bilateral    Cardiovascular:  [ ] Regular [ ] Irregular [x ] Tachycardia   [ ] Bradycardia  [ ] Murmur [ ] Other    Abdomen: [x ] Soft  [ x] Distended   [x ]Hypoactive +BS  [ ] Non tender [ x] Tender  [ ]PEG   [ ]OGT/ NGT   Last BM:       Genitourinary: [x ] Normal [ ] Incontinent   [ ] Oliguria/Anuria   [ ] Tovar    Musculoskeletal:  [ ] Normal   [x ] Weakness  [ ] Bedbound/Wheelchair bound [ ] Edema    Neurological: [ x] No focal deficits  [ ] Cognitive impairment  [ ] Dysphagia [ ] Dysarthria [ ] Paresis [ ] Other     Skin: [x Normal   [ ] Pressure ulcer(s)                  [ ] Rash    LABS: reviewed                        14.3   18.9  )-----------( 367      ( 01 Mar 2018 09:37 )             45.9     03-01    134<L>  |  94<L>  |  24<H>  ----------------------------<  216<H>  5.2   |  16<L>  |  0.63    Ca    9.0      01 Mar 2018 09:32  Phos  2.5     03-01  Mg     2.1     03-01    TPro  5.7<L>  /  Alb  2.3<L>  /  TBili  6.0<H>  /  DBili  x   /  AST  620<H>  /  ALT  444<H>  /  AlkPhos  651<H>  03-01          Shock: [ ] Septic [ ] Cardiogenic [ ] Neurologic [ ] Hypovolemic  Vasopressors x   Inotrophs x     Oral Intake: [x ] Unable/mouth care only [ ] Minimal [ ] Moderate [x ] Full Capability    Diet: [ ] NPO [ ] Tube feeds [ ] TPN [ ] Other     RADIOLOGY & ADDITIONAL STUDIES:    REFERRALS:   [x ] Chaplaincy  [x ] Hospice  [ ] Child Life  [x ] Social Work  [ ] Case management [ ] Holistic Therapy INTERVAL HPI/OVERNIGHT EVENTS:  Patient lethargic. PCA infusing with basal rate.  Pt resting comfortably w/o disress.    Allergies    No Known Allergies    Intolerances      ADVANCE DIRECTIVES:    DNR   MOLST [ ] YES [ x] NO     MEDICATIONS  (STANDING):  dexamethasone  Injectable 4 milliGRAM(s) IV Push two times a day  dextrose 50% Injectable 12.5 Gram(s) IV Push once  dextrose 50% Injectable 25 Gram(s) IV Push once  dextrose 50% Injectable 25 Gram(s) IV Push once  docusate sodium 100 milliGRAM(s) Oral three times a day  HYDROmorphone PCA (1 mG/mL) 30 milliLiter(s) PCA Continuous PCA Continuous  insulin lispro (HumaLOG) corrective regimen sliding scale   SubCutaneous three times a day before meals  insulin lispro (HumaLOG) corrective regimen sliding scale   SubCutaneous at bedtime  ondansetron Injectable 4 milliGRAM(s) IV Push every 6 hours  senna 2 Tablet(s) Oral at bedtime  sodium chloride 0.9%. 1000 milliLiter(s) (10 mL/Hr) IV Continuous <Continuous>    MEDICATIONS  (PRN):  bisacodyl Suppository 10 milliGRAM(s) Rectal daily PRN Constipation  dextrose Gel 1 Dose(s) Oral once PRN Blood Glucose LESS THAN 70 milliGRAM(s)/deciliter  glucagon  Injectable 1 milliGRAM(s) IntraMuscular once PRN Glucose LESS THAN 70 milligrams/deciliter  haloperidol    Injectable 0.5 milliGRAM(s) IV Push every 6 hours PRN nausea / vomiting  HYDROmorphone PCA (1 mG/mL) Rescue Clinician Bolus 1 milliGRAM(s) IV Push every 1 hour PRN for Pain Scale GREATER THAN 6  LORazepam   Injectable 0.5 milliGRAM(s) IV Push every 4 hours PRN agitation/anxiety  naloxone Injectable 0.1 milliGRAM(s) IV Push every 3 minutes PRN For ANY of the following changes in patient status:  A. RR LESS THAN 10 breaths per minute, B. Oxygen saturation LESS THAN 90%, C. Sedation score of 6        PRESENT SYMPTOMS:  Source: [ ] Patient   x ] Family   [x ] Team     Pain:                        [ ] No [ ] Yes             [ ] Mild [ ] Moderate [ ] Severe  pt unable to describe pain today as he is lethargic and unable to answer questions    Onset -  Location   Duration -  Character -  Alleviating/Aggravating   Radiation -  Timing -    Dyspnea:                [ x] No [ ] Yes             [ ] Mild [ ] Moderate [ ] Severe    Anxiety:                  [x ] No [ ] Yes             [ ] Mild [ ] Moderate [ ] Severe    Fatigue:                  [ ] No [x ] Yes             [ ] Mild [x ] Moderate [ ] Severe    Nausea:                  [ ] No [x ] Yes             [ ] Mild [x ] Moderate [ ] Severe    Loss of appetite:   [ ] No [x ] Yes             [ ] Mild [x ] Moderate [ ] Severe    Constipation:        [ ] No [x ] Yes             [ ] Mild [x ] Moderate [ ] Severe    Other Symptoms:  [ ] All other review of systems negative    ] Unable to obtain due to poor mentation     Karnofsky Performance Score/Palliative Performance Status Version 2:     10    %    PHYSICAL EXAM:  Vital Signs Last 24 Hrs  T(C): 36.7 (02 Mar 2018 08:00), Max: 36.9 (01 Mar 2018 19:28)  T(F): 98.1 (02 Mar 2018 08:00), Max: 98.4 (01 Mar 2018 19:28)  HR: 88 (02 Mar 2018 08:00) (88 - 97)  BP: 118/65 (02 Mar 2018 08:00) (118/65 - 134/85)  BP(mean): --  RR: 22 (02 Mar 2018 13:30) (16 - 22)  SpO2: 85% (02 Mar 2018 13:30) (85% - 95%)        General:  [ ] Alert  [x ] Oriented   [x ] Lethargic  [ ] Agitated   [ x] Cachexia   [x ] Unarousable  [ ] Verbal  [x ] Non-Verbal    HEENT:  [ ] Normal   [x ] Dry mouth   [ ] ET Tube    [ ] Trach  [ ] Oral lesions    Lungs:   [x ] Clear [ ] Tachypnea  [ ] Audible excessive secretions   [ ] Rhonchi        [ ] Right [ ] Left [ ] Bilateral  [ ] Crackles        [ ] Right [ ] Left [ ] Bilateral  [ ] Wheezing     [ ] Right [ ] Left [ ] Bilateral    Cardiovascular:  [ ] Regular [ ] Irregular [x ] Tachycardia   [ ] Bradycardia  [ ] Murmur [ ] Other    Abdomen: [x ] Soft  [ x] Distended   [x ]Hypoactive +BS  [ ] Non tender [ x] Tender  [ ]PEG   [ ]OGT/ NGT   Last BM:       Genitourinary: [x ] Normal [ ] Incontinent   [ ] Oliguria/Anuria   [ ] Tovar    Musculoskeletal:  [ ] Normal   [x ] Weakness  [ ] Bedbound/Wheelchair bound [ ] Edema    Neurological: [ x] No focal deficits  [ ] Cognitive impairment  [ ] Dysphagia [ ] Dysarthria [ ] Paresis [ ] Other     Skin: [x Normal   [ ] Pressure ulcer(s)                  [ ] Rash    LABS: no labs for review today    Oral Intake: [x ] Unable/mouth care only [ ] Minimal [ ] Moderate [x ] Full Capability    Diet: [ ] NPO [ ] Tube feeds [ ] TPN [ ] Other     RADIOLOGY & ADDITIONAL STUDIES: no new imaging for review    REFERRALS:   [x ] Chaplaincy  [x ] Hospice  [ ] Child Life  [x ] Social Work  [ ] Case management [ ] Holistic Therapy

## 2018-03-02 NOTE — PROGRESS NOTE ADULT - PROBLEM SELECTOR PLAN 2
PCA pump started with basal rate of 0.3mgs/hr.  Pt now sedated unarrousable.  will d/c basal rate and use prn dosing of 0.3mgs q15 mins.  Will continue to reassess.

## 2018-03-02 NOTE — PROGRESS NOTE ADULT - ATTENDING COMMENTS
Patient sedated during our evaluation this AM. During the day, patient entered active dying process, and passed away in the afternoon surrounded by family, comfortably.     I was physically present for the key portions of the evaluation and management (E/M) service provided.  I agree with the above history, physical, and plan which I have reviewed and edited where appropriate. Plan discussed with team.

## 2018-03-02 NOTE — DISCHARGE NOTE FOR THE EXPIRED PATIENT - HOSPITAL COURSE
62 year old man with history of Type II DM, WPW (s/p ablation 2016), TIA, and recent diagnosis of poorly differentiated esophageal adenocarcinoma with mets to liver, lung, and brain admitted on 2/20 for severe dehydration and nausea s/p esophageal stent, brought to PCU for continued care and pain management. Patient was managed for symptoms and passed away peacefully with family at bedside.

## 2018-03-02 NOTE — PROGRESS NOTE ADULT - PROVIDER SPECIALTY LIST ADULT
Gastroenterology
Heme/Onc
Internal Medicine
Palliative Care
Gastroenterology
Internal Medicine
Palliative Care
Internal Medicine
Internal Medicine

## 2018-03-02 NOTE — PROGRESS NOTE ADULT - ASSESSMENT
62 year old man with history of Type II DM, WPW (s/p ablation 2016), TIA, and recent diagnosis of poorly differentiated esophageal adenocarcinoma with mets to liver, lung, and brain admitted on 2/20 for severe dehydration and nausea s/p esophageal stent, brought to PCU for continued care and pain management.

## 2018-03-02 NOTE — PROGRESS NOTE ADULT - PROBLEM SELECTOR PLAN 5
Met with son and dtr at bedside.  Discussed PCA pump and that we will be lowering the dose since the pt was awake and alert before starting standing dose.  They verbalize understanding and agree with plan.  Will continue to reassess.

## 2018-03-09 NOTE — CHART NOTE - NSCHARTNOTEFT_GEN_A_CORE
Off service note for CDI clarification as requested by Rajinder Rubin, Encounter # [id number].     The patient had severe protein calorie malnutrition.

## 2018-12-31 LAB
AFPL3 RESULTS RECEIVED: NORMAL
ALBUMIN SERPL ELPH-MCNC: 3.6 G/DL
ALP BLD-CCNC: 327 U/L
ALPHA-1-FETOPROTEIN-L3: 33.3 %
ALPHA-1-FETOPROTEIN: 1112.4 NG/ML
ALT SERPL-CCNC: 180 U/L
ANION GAP SERPL CALC-SCNC: 24 MMOL/L
AST SERPL-CCNC: 182 U/L
BILIRUB SERPL-MCNC: 0.9 MG/DL
BUN SERPL-MCNC: 20 MG/DL
CALCIUM SERPL-MCNC: 10.1 MG/DL
CANCER AG19-9 SERPL-ACNC: 286.8 U/ML
CHLORIDE SERPL-SCNC: 91 MMOL/L
CO2 SERPL-SCNC: 21 MMOL/L
CREAT SERPL-MCNC: 1.02 MG/DL
CRP SERPL-MCNC: 19.4 MG/DL
GLUCOSE SERPL-MCNC: 154 MG/DL
HBV CORE IGG+IGM SER QL: NONREACTIVE
HBV SURFACE AB SER QL: NONREACTIVE
HBV SURFACE AG SER QL: NONREACTIVE
HCV AB SER QL: NONREACTIVE
HCV S/CO RATIO: 0.2 S/CO
INR PPP: 1.17 RATIO
POTASSIUM SERPL-SCNC: 4.9 MMOL/L
PROT SERPL-MCNC: 7.4 G/DL
PT BLD: 13.3 SEC
SODIUM SERPL-SCNC: 136 MMOL/L

## 2019-01-07 NOTE — PROGRESS NOTE ADULT - PROBLEM SELECTOR PLAN 1
PT DAILY TREATMENT NOTE 10-18 Patient Name: Manny Sales Date:2019 : 1937 [x]  Patient  Verified Payor: [place] / Plan: Rolando Joseph / Product Type: Medicare / In time:11:00  Out time:11:30 Total Treatment Time (min): 30 Visit #: 2 of 4 Medicare/BCBS Only Total Timed Codes (min):  30 1:1 Treatment Time: 28 Treatment Area: Left shoulder pain [M25.512] Low back pain [M54.5] SUBJECTIVE Pain Level (0-10 scale): 6 Any medication changes, allergies to medications, adverse drug reactions, diagnosis change, or new procedure performed?: [x] No    [] Yes (see summary sheet for update) Subjective functional status/changes:   [] No changes reported \"I feel much better, I am even going to try and go get my hair done\". OBJECTIVE 28 min Therapeutic Exercise:  [x] See flow sheet :  
Rationale: increase ROM and increase strength to improve the patients ability to perform ADLs with improved ease. With 
 [x] TE 
 [] TA 
 [] neuro 
 [] other: Patient Education: [x] Review HEP [] Progressed/Changed HEP based on:  
[x] positioning   [] body mechanics   [] transfers   [] heat/ice application   
[] other:   
 
Other Objective/Functional Measures: Added seated t/s extension stretch with half roll. Pain Level (0-10 scale) post treatment: 5 
 
ASSESSMENT/Changes in Function: Patient tolerates new exercises and demonstrates decreased symptoms post session today. Patient demonstrates improved quality of mobility and improved speed on stationary bike noted today. Patient is progressing well toward established goals.   
 
Patient will continue to benefit from skilled PT services to modify and progress therapeutic interventions, address functional mobility deficits, address ROM deficits, address strength deficits, analyze and address soft tissue restrictions, analyze and cue movement patterns, analyze and modify body mechanics/ergonomics and assess and modify postural abnormalities to attain remaining goals. [x]  See Plan of Care 
[]  See progress note/recertification 
[]  See Discharge Summary Progress towards goals / Updated goals: 
Progress towards goals / Updated goals: 
1. Patient will be able to decrease pain to no more than 5/10 in order to perform ADLs. Progressing (6/10) 1/2/19 2. Patient will be able to improve FOTO score to 47 in order to demonstrate improvements in functional independence. Progressing 12/31/18 FOTO score = 44  
 3. Patient will be able to carry groceries with no limitations in order to improve functional independence.  
 4. Patient will be able to get in and out of bed in order to improve functional mobility. Progressing 12/19/18 per patient report 
 5. Patient will be able to improve left shoulder ER to 50 degrees in order to improve functional mobility. Progressing 12/20/18 Right AROM shoulder ER - 28 degrees  
 6. Patient will be able to improve B/L glute med strength to 4/5 in order to improve ADLs.   
 
 
PLAN 
[]  Upgrade activities as tolerated     [x]  Continue plan of care 
[]  Update interventions per flow sheet      
[]  Discharge due to:_ 
[]  Other:_ Julieta Rodriguez PT 1/7/2019  11:07 AM 
 
Future Appointments Date Time Provider Mariah Lamar 1/11/2019  2:00 PM [facility]  
1/17/2019 10:00 AM Dexter Park PT [facility]  
 
 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 CTAP shows no change from previous: Distal esophageal mass, diffuse pulmonary, mediastinal, hilar and hepatic metastatic disease  -continue w/ decadron  - Gastroenterology recs appreciated, EGD with stent placement on 2/22/2018.   - Onco recs appreciated, pending nutritional status improvement.   - C/w with Morphine as needed, tolerating well.  - Zofran for nausea and vomiting.

## 2019-04-03 NOTE — ED ADULT NURSE NOTE - NSSISCREENINGQ1_ED_A_ED
Problem: Adult Inpatient Plan of Care  Goal: Plan of Care Review  Outcome: Ongoing (interventions implemented as appropriate)  Arrived to unit. Blood collected for Dr. Altamirano and Dr. Bartlett labs. Pt received Procrit as ordered. Pt in wheelchair, discharged from unit. Dr Altamirano's office was notified of K of 2.2. Dr. Bartlett's office, Edwina Calderon notifed of K of 2.2 as well. Dr. Bartlett will follow up with pt.        No

## 2019-05-30 NOTE — PROGRESS NOTE ADULT - ASSESSMENT
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 He p/w reduced oral intake and appetite.    Impression:  1) Stage IV Esophageal Adenocarcinoma - with dysphagia. Now s/p 23 mm x 15 cm WallFlex partially covered stent placement (2/22/2018).   2) Elevated liver enzymes - likely due to metastatic liver disease    Plan:  - Continue pureed diet as tolerated  - Pain control per primary team  - Monitor electrolytes  - Avoid hepatotoxic agents English

## 2022-09-24 NOTE — ED ADULT NURSE NOTE - ED STAT RN HANDOFF DETAILS
You can access the FollowMyHealth Patient Portal offered by [facility] by registering at the following website: [email or web address]. By joining [employer]’s FollowMyHealth portal, you will also be able to view your health information using other applications (apps) compatible with our system.
Handoff report given to RICKY Byrne

## 2024-10-02 NOTE — ED ADULT NURSE NOTE - CHIEF COMPLAINT
Bed: ED03  Expected date:   Expected time:   Means of arrival:   Comments:  FT 4   The patient is a 62y Male complaining of abdominal pain.

## 2025-02-03 NOTE — ED ADULT NURSE NOTE - EXTENSIONS OF SELF_ADULT
X Size Of Lesion In Cm (Optional): 0 Introduction Text (Please End With A Colon): The following procedure was deferred: will re check in next visit for a possible biopsy. Detail Level: Detailed None

## 2025-05-27 NOTE — PHYSICAL THERAPY INITIAL EVALUATION ADULT - CRITERIA FOR SKILLED THERAPEUTIC INTERVENTIONS
[de-identified] : follow up/ vaccines
[FreeTextEntry6] : URI at [facility], has since recovered. Afebrile and presents feeling well. 
risk reduction/prevention
impairments found